# Patient Record
Sex: FEMALE | Race: BLACK OR AFRICAN AMERICAN | NOT HISPANIC OR LATINO | URBAN - METROPOLITAN AREA
[De-identification: names, ages, dates, MRNs, and addresses within clinical notes are randomized per-mention and may not be internally consistent; named-entity substitution may affect disease eponyms.]

---

## 2021-09-29 ENCOUNTER — INPATIENT (INPATIENT)
Facility: HOSPITAL | Age: 71
LOS: 5 days | Discharge: HOME | End: 2021-10-05
Attending: INTERNAL MEDICINE | Admitting: INTERNAL MEDICINE
Payer: COMMERCIAL

## 2021-09-29 VITALS
OXYGEN SATURATION: 100 % | SYSTOLIC BLOOD PRESSURE: 130 MMHG | RESPIRATION RATE: 18 BRPM | TEMPERATURE: 98 F | HEART RATE: 90 BPM | DIASTOLIC BLOOD PRESSURE: 53 MMHG

## 2021-09-29 PROCEDURE — 99285 EMERGENCY DEPT VISIT HI MDM: CPT

## 2021-09-29 NOTE — ED ADULT TRIAGE NOTE - CHIEF COMPLAINT QUOTE
pt c/o several episodes black stook since yesterday, endorses intermittent generalized weakness. Denies abdominal pain, n/v, SOB. Denies blood thinner use

## 2021-09-30 DIAGNOSIS — D62 ACUTE POSTHEMORRHAGIC ANEMIA: ICD-10-CM

## 2021-09-30 DIAGNOSIS — Z98.49 CATARACT EXTRACTION STATUS, UNSPECIFIED EYE: Chronic | ICD-10-CM

## 2021-09-30 DIAGNOSIS — E87.2 ACIDOSIS: ICD-10-CM

## 2021-09-30 DIAGNOSIS — K92.2 GASTROINTESTINAL HEMORRHAGE, UNSPECIFIED: ICD-10-CM

## 2021-09-30 DIAGNOSIS — D50.9 IRON DEFICIENCY ANEMIA, UNSPECIFIED: ICD-10-CM

## 2021-09-30 DIAGNOSIS — E11.65 TYPE 2 DIABETES MELLITUS WITH HYPERGLYCEMIA: ICD-10-CM

## 2021-09-30 DIAGNOSIS — D21.9 BENIGN NEOPLASM OF CONNECTIVE AND OTHER SOFT TISSUE, UNSPECIFIED: ICD-10-CM

## 2021-09-30 DIAGNOSIS — M54.9 DORSALGIA, UNSPECIFIED: ICD-10-CM

## 2021-09-30 DIAGNOSIS — Z29.9 ENCOUNTER FOR PROPHYLACTIC MEASURES, UNSPECIFIED: ICD-10-CM

## 2021-09-30 DIAGNOSIS — M54.17 RADICULOPATHY, LUMBOSACRAL REGION: ICD-10-CM

## 2021-09-30 DIAGNOSIS — E11.9 TYPE 2 DIABETES MELLITUS WITHOUT COMPLICATIONS: ICD-10-CM

## 2021-09-30 LAB
A1C WITH ESTIMATED AVERAGE GLUCOSE RESULT: 6.8 % — HIGH (ref 4–5.6)
ALBUMIN SERPL ELPH-MCNC: 3.6 G/DL — SIGNIFICANT CHANGE UP (ref 3.3–5)
ALBUMIN SERPL ELPH-MCNC: 3.8 G/DL — SIGNIFICANT CHANGE UP (ref 3.3–5)
ALP SERPL-CCNC: 83 U/L — SIGNIFICANT CHANGE UP (ref 40–120)
ALP SERPL-CCNC: 84 U/L — SIGNIFICANT CHANGE UP (ref 40–120)
ALT FLD-CCNC: 11 U/L — SIGNIFICANT CHANGE UP (ref 4–33)
ALT FLD-CCNC: 11 U/L — SIGNIFICANT CHANGE UP (ref 4–33)
ANION GAP SERPL CALC-SCNC: 12 MMOL/L — SIGNIFICANT CHANGE UP (ref 7–14)
ANION GAP SERPL CALC-SCNC: 13 MMOL/L — SIGNIFICANT CHANGE UP (ref 7–14)
APTT BLD: 26.5 SEC — LOW (ref 27–36.3)
APTT BLD: 31.3 SEC — SIGNIFICANT CHANGE UP (ref 27–36.3)
AST SERPL-CCNC: 12 U/L — SIGNIFICANT CHANGE UP (ref 4–32)
AST SERPL-CCNC: 12 U/L — SIGNIFICANT CHANGE UP (ref 4–32)
BASE EXCESS BLDV CALC-SCNC: -4.2 MMOL/L — LOW (ref -2–3)
BASOPHILS # BLD AUTO: 0.02 K/UL — SIGNIFICANT CHANGE UP (ref 0–0.2)
BASOPHILS # BLD AUTO: 0.02 K/UL — SIGNIFICANT CHANGE UP (ref 0–0.2)
BASOPHILS NFR BLD AUTO: 0.2 % — SIGNIFICANT CHANGE UP (ref 0–2)
BASOPHILS NFR BLD AUTO: 0.3 % — SIGNIFICANT CHANGE UP (ref 0–2)
BILIRUB SERPL-MCNC: <0.2 MG/DL — SIGNIFICANT CHANGE UP (ref 0.2–1.2)
BILIRUB SERPL-MCNC: <0.2 MG/DL — SIGNIFICANT CHANGE UP (ref 0.2–1.2)
BLD GP AB SCN SERPL QL: NEGATIVE — SIGNIFICANT CHANGE UP
BLOOD GAS VENOUS COMPREHENSIVE RESULT: SIGNIFICANT CHANGE UP
BUN SERPL-MCNC: 36 MG/DL — HIGH (ref 7–23)
BUN SERPL-MCNC: 39 MG/DL — HIGH (ref 7–23)
CALCIUM SERPL-MCNC: 8.8 MG/DL — SIGNIFICANT CHANGE UP (ref 8.4–10.5)
CALCIUM SERPL-MCNC: 8.8 MG/DL — SIGNIFICANT CHANGE UP (ref 8.4–10.5)
CHLORIDE BLDV-SCNC: 113 MMOL/L — HIGH (ref 96–108)
CHLORIDE SERPL-SCNC: 107 MMOL/L — SIGNIFICANT CHANGE UP (ref 98–107)
CHLORIDE SERPL-SCNC: 109 MMOL/L — HIGH (ref 98–107)
CO2 BLDV-SCNC: 23.5 MMOL/L — SIGNIFICANT CHANGE UP (ref 22–26)
CO2 SERPL-SCNC: 20 MMOL/L — LOW (ref 22–31)
CO2 SERPL-SCNC: 21 MMOL/L — LOW (ref 22–31)
CREAT SERPL-MCNC: 1.01 MG/DL — SIGNIFICANT CHANGE UP (ref 0.5–1.3)
CREAT SERPL-MCNC: 1.12 MG/DL — SIGNIFICANT CHANGE UP (ref 0.5–1.3)
EOSINOPHIL # BLD AUTO: 0.04 K/UL — SIGNIFICANT CHANGE UP (ref 0–0.5)
EOSINOPHIL # BLD AUTO: 0.08 K/UL — SIGNIFICANT CHANGE UP (ref 0–0.5)
EOSINOPHIL NFR BLD AUTO: 0.5 % — SIGNIFICANT CHANGE UP (ref 0–6)
EOSINOPHIL NFR BLD AUTO: 1.1 % — SIGNIFICANT CHANGE UP (ref 0–6)
ESTIMATED AVERAGE GLUCOSE: 148 — SIGNIFICANT CHANGE UP
FERRITIN SERPL-MCNC: 65 NG/ML — SIGNIFICANT CHANGE UP (ref 15–150)
GAS PNL BLDV: 141 MMOL/L — SIGNIFICANT CHANGE UP (ref 136–145)
GLUCOSE BLDC GLUCOMTR-MCNC: 115 MG/DL — HIGH (ref 70–99)
GLUCOSE BLDC GLUCOMTR-MCNC: 126 MG/DL — HIGH (ref 70–99)
GLUCOSE BLDC GLUCOMTR-MCNC: 142 MG/DL — HIGH (ref 70–99)
GLUCOSE BLDC GLUCOMTR-MCNC: 175 MG/DL — HIGH (ref 70–99)
GLUCOSE BLDC GLUCOMTR-MCNC: 175 MG/DL — HIGH (ref 70–99)
GLUCOSE BLDC GLUCOMTR-MCNC: 208 MG/DL — HIGH (ref 70–99)
GLUCOSE BLDV-MCNC: 189 MG/DL — HIGH (ref 70–99)
GLUCOSE SERPL-MCNC: 183 MG/DL — HIGH (ref 70–99)
GLUCOSE SERPL-MCNC: 337 MG/DL — HIGH (ref 70–99)
HCO3 BLDV-SCNC: 22 MMOL/L — SIGNIFICANT CHANGE UP (ref 22–29)
HCT VFR BLD CALC: 21.5 % — LOW (ref 34.5–45)
HCT VFR BLD CALC: 21.7 % — LOW (ref 34.5–45)
HCT VFR BLD CALC: 25.7 % — LOW (ref 34.5–45)
HCT VFR BLD CALC: 26 % — LOW (ref 34.5–45)
HCT VFR BLDA CALC: 22 % — LOW (ref 34.5–46.5)
HGB BLD CALC-MCNC: 7.2 G/DL — LOW (ref 11.5–15.5)
HGB BLD-MCNC: 6.7 G/DL — CRITICAL LOW (ref 11.5–15.5)
HGB BLD-MCNC: 6.8 G/DL — CRITICAL LOW (ref 11.5–15.5)
HGB BLD-MCNC: 8.3 G/DL — LOW (ref 11.5–15.5)
HGB BLD-MCNC: 8.4 G/DL — LOW (ref 11.5–15.5)
IANC: 3.92 K/UL — SIGNIFICANT CHANGE UP (ref 1.5–8.5)
IANC: 5.85 K/UL — SIGNIFICANT CHANGE UP (ref 1.5–8.5)
IMM GRANULOCYTES NFR BLD AUTO: 0.7 % — SIGNIFICANT CHANGE UP (ref 0–1.5)
IMM GRANULOCYTES NFR BLD AUTO: 1.2 % — SIGNIFICANT CHANGE UP (ref 0–1.5)
INR BLD: 0.98 RATIO — SIGNIFICANT CHANGE UP (ref 0.88–1.16)
INR BLD: 1 RATIO — SIGNIFICANT CHANGE UP (ref 0.88–1.16)
IRON SATN MFR SERPL: 28 % — SIGNIFICANT CHANGE UP (ref 14–50)
IRON SATN MFR SERPL: 64 UG/DL — SIGNIFICANT CHANGE UP (ref 30–160)
LACTATE BLDV-MCNC: 1.1 MMOL/L — SIGNIFICANT CHANGE UP (ref 0.5–2)
LYMPHOCYTES # BLD AUTO: 1.96 K/UL — SIGNIFICANT CHANGE UP (ref 1–3.3)
LYMPHOCYTES # BLD AUTO: 2.75 K/UL — SIGNIFICANT CHANGE UP (ref 1–3.3)
LYMPHOCYTES # BLD AUTO: 23.3 % — SIGNIFICANT CHANGE UP (ref 13–44)
LYMPHOCYTES # BLD AUTO: 36.8 % — SIGNIFICANT CHANGE UP (ref 13–44)
MAGNESIUM SERPL-MCNC: 2.7 MG/DL — HIGH (ref 1.6–2.6)
MCHC RBC-ENTMCNC: 26.7 PG — LOW (ref 27–34)
MCHC RBC-ENTMCNC: 27 PG — SIGNIFICANT CHANGE UP (ref 27–34)
MCHC RBC-ENTMCNC: 27.2 PG — SIGNIFICANT CHANGE UP (ref 27–34)
MCHC RBC-ENTMCNC: 28 PG — SIGNIFICANT CHANGE UP (ref 27–34)
MCHC RBC-ENTMCNC: 31.2 GM/DL — LOW (ref 32–36)
MCHC RBC-ENTMCNC: 31.3 GM/DL — LOW (ref 32–36)
MCHC RBC-ENTMCNC: 31.9 GM/DL — LOW (ref 32–36)
MCHC RBC-ENTMCNC: 32.7 GM/DL — SIGNIFICANT CHANGE UP (ref 32–36)
MCV RBC AUTO: 85.2 FL — SIGNIFICANT CHANGE UP (ref 80–100)
MCV RBC AUTO: 85.7 FL — SIGNIFICANT CHANGE UP (ref 80–100)
MCV RBC AUTO: 85.7 FL — SIGNIFICANT CHANGE UP (ref 80–100)
MCV RBC AUTO: 86.1 FL — SIGNIFICANT CHANGE UP (ref 80–100)
MONOCYTES # BLD AUTO: 0.46 K/UL — SIGNIFICANT CHANGE UP (ref 0–0.9)
MONOCYTES # BLD AUTO: 0.66 K/UL — SIGNIFICANT CHANGE UP (ref 0–0.9)
MONOCYTES NFR BLD AUTO: 5.5 % — SIGNIFICANT CHANGE UP (ref 2–14)
MONOCYTES NFR BLD AUTO: 8.8 % — SIGNIFICANT CHANGE UP (ref 2–14)
NEUTROPHILS # BLD AUTO: 3.92 K/UL — SIGNIFICANT CHANGE UP (ref 1.8–7.4)
NEUTROPHILS # BLD AUTO: 5.85 K/UL — SIGNIFICANT CHANGE UP (ref 1.8–7.4)
NEUTROPHILS NFR BLD AUTO: 52.3 % — SIGNIFICANT CHANGE UP (ref 43–77)
NEUTROPHILS NFR BLD AUTO: 69.3 % — SIGNIFICANT CHANGE UP (ref 43–77)
NRBC # BLD: 0 /100 WBCS — SIGNIFICANT CHANGE UP
NRBC # FLD: 0.02 K/UL — HIGH
NRBC # FLD: 0.04 K/UL — HIGH
NRBC # FLD: 0.04 K/UL — HIGH
NRBC # FLD: 0.05 K/UL — HIGH
OB PNL STL: POSITIVE
PCO2 BLDV: 46 MMHG — HIGH (ref 39–42)
PH BLDV: 7.29 — LOW (ref 7.32–7.43)
PHOSPHATE SERPL-MCNC: 4.3 MG/DL — SIGNIFICANT CHANGE UP (ref 2.5–4.5)
PLATELET # BLD AUTO: 185 K/UL — SIGNIFICANT CHANGE UP (ref 150–400)
PLATELET # BLD AUTO: 201 K/UL — SIGNIFICANT CHANGE UP (ref 150–400)
PLATELET # BLD AUTO: 203 K/UL — SIGNIFICANT CHANGE UP (ref 150–400)
PLATELET # BLD AUTO: 233 K/UL — SIGNIFICANT CHANGE UP (ref 150–400)
PO2 BLDV: 42 MMHG — SIGNIFICANT CHANGE UP
POTASSIUM BLDV-SCNC: 4.2 MMOL/L — SIGNIFICANT CHANGE UP (ref 3.5–5.1)
POTASSIUM SERPL-MCNC: 4.1 MMOL/L — SIGNIFICANT CHANGE UP (ref 3.5–5.3)
POTASSIUM SERPL-MCNC: 4.4 MMOL/L — SIGNIFICANT CHANGE UP (ref 3.5–5.3)
POTASSIUM SERPL-SCNC: 4.1 MMOL/L — SIGNIFICANT CHANGE UP (ref 3.5–5.3)
POTASSIUM SERPL-SCNC: 4.4 MMOL/L — SIGNIFICANT CHANGE UP (ref 3.5–5.3)
PROT SERPL-MCNC: 5.8 G/DL — LOW (ref 6–8.3)
PROT SERPL-MCNC: 6.1 G/DL — SIGNIFICANT CHANGE UP (ref 6–8.3)
PROTHROM AB SERPL-ACNC: 11.3 SEC — SIGNIFICANT CHANGE UP (ref 10.6–13.6)
PROTHROM AB SERPL-ACNC: 11.5 SEC — SIGNIFICANT CHANGE UP (ref 10.6–13.6)
RBC # BLD: 2.51 M/UL — LOW (ref 3.8–5.2)
RBC # BLD: 2.52 M/UL — LOW (ref 3.8–5.2)
RBC # BLD: 3 M/UL — LOW (ref 3.8–5.2)
RBC # BLD: 3.05 M/UL — LOW (ref 3.8–5.2)
RBC # FLD: 17.2 % — HIGH (ref 10.3–14.5)
RBC # FLD: 17.8 % — HIGH (ref 10.3–14.5)
RBC # FLD: 18.6 % — HIGH (ref 10.3–14.5)
RBC # FLD: 19.6 % — HIGH (ref 10.3–14.5)
RH IG SCN BLD-IMP: POSITIVE — SIGNIFICANT CHANGE UP
RH IG SCN BLD-IMP: POSITIVE — SIGNIFICANT CHANGE UP
SAO2 % BLDV: 73.9 % — SIGNIFICANT CHANGE UP
SARS-COV-2 RNA SPEC QL NAA+PROBE: SIGNIFICANT CHANGE UP
SODIUM SERPL-SCNC: 141 MMOL/L — SIGNIFICANT CHANGE UP (ref 135–145)
SODIUM SERPL-SCNC: 141 MMOL/L — SIGNIFICANT CHANGE UP (ref 135–145)
TIBC SERPL-MCNC: 232 UG/DL — SIGNIFICANT CHANGE UP (ref 220–430)
UIBC SERPL-MCNC: 168 UG/DL — SIGNIFICANT CHANGE UP (ref 110–370)
WBC # BLD: 7.48 K/UL — SIGNIFICANT CHANGE UP (ref 3.8–10.5)
WBC # BLD: 7.51 K/UL — SIGNIFICANT CHANGE UP (ref 3.8–10.5)
WBC # BLD: 8.07 K/UL — SIGNIFICANT CHANGE UP (ref 3.8–10.5)
WBC # BLD: 8.43 K/UL — SIGNIFICANT CHANGE UP (ref 3.8–10.5)
WBC # FLD AUTO: 7.48 K/UL — SIGNIFICANT CHANGE UP (ref 3.8–10.5)
WBC # FLD AUTO: 7.51 K/UL — SIGNIFICANT CHANGE UP (ref 3.8–10.5)
WBC # FLD AUTO: 8.07 K/UL — SIGNIFICANT CHANGE UP (ref 3.8–10.5)
WBC # FLD AUTO: 8.43 K/UL — SIGNIFICANT CHANGE UP (ref 3.8–10.5)

## 2021-09-30 PROCEDURE — 99233 SBSQ HOSP IP/OBS HIGH 50: CPT | Mod: GC

## 2021-09-30 PROCEDURE — 44360 SMALL BOWEL ENDOSCOPY: CPT | Mod: GC

## 2021-09-30 PROCEDURE — 74178 CT ABD&PLV WO CNTR FLWD CNTR: CPT | Mod: 26

## 2021-09-30 RX ORDER — PANTOPRAZOLE SODIUM 20 MG/1
80 TABLET, DELAYED RELEASE ORAL ONCE
Refills: 0 | Status: COMPLETED | OUTPATIENT
Start: 2021-09-30 | End: 2021-09-30

## 2021-09-30 RX ORDER — SODIUM CHLORIDE 9 MG/ML
1000 INJECTION, SOLUTION INTRAVENOUS
Refills: 0 | Status: DISCONTINUED | OUTPATIENT
Start: 2021-09-30 | End: 2021-10-01

## 2021-09-30 RX ORDER — DEXTROSE 50 % IN WATER 50 %
12.5 SYRINGE (ML) INTRAVENOUS ONCE
Refills: 0 | Status: DISCONTINUED | OUTPATIENT
Start: 2021-09-30 | End: 2021-10-01

## 2021-09-30 RX ORDER — SOD SULF/SODIUM/NAHCO3/KCL/PEG
4000 SOLUTION, RECONSTITUTED, ORAL ORAL ONCE
Refills: 0 | Status: COMPLETED | OUTPATIENT
Start: 2021-09-30 | End: 2021-09-30

## 2021-09-30 RX ORDER — LANOLIN ALCOHOL/MO/W.PET/CERES
3 CREAM (GRAM) TOPICAL AT BEDTIME
Refills: 0 | Status: DISCONTINUED | OUTPATIENT
Start: 2021-09-30 | End: 2021-10-05

## 2021-09-30 RX ORDER — GLUCAGON INJECTION, SOLUTION 0.5 MG/.1ML
1 INJECTION, SOLUTION SUBCUTANEOUS ONCE
Refills: 0 | Status: DISCONTINUED | OUTPATIENT
Start: 2021-09-30 | End: 2021-10-01

## 2021-09-30 RX ORDER — DEXTROSE 50 % IN WATER 50 %
15 SYRINGE (ML) INTRAVENOUS ONCE
Refills: 0 | Status: DISCONTINUED | OUTPATIENT
Start: 2021-09-30 | End: 2021-10-01

## 2021-09-30 RX ORDER — INSULIN LISPRO 100/ML
VIAL (ML) SUBCUTANEOUS EVERY 6 HOURS
Refills: 0 | Status: DISCONTINUED | OUTPATIENT
Start: 2021-09-30 | End: 2021-10-01

## 2021-09-30 RX ORDER — DEXTROSE 50 % IN WATER 50 %
25 SYRINGE (ML) INTRAVENOUS ONCE
Refills: 0 | Status: DISCONTINUED | OUTPATIENT
Start: 2021-09-30 | End: 2021-10-01

## 2021-09-30 RX ORDER — ONDANSETRON 8 MG/1
4 TABLET, FILM COATED ORAL EVERY 8 HOURS
Refills: 0 | Status: DISCONTINUED | OUTPATIENT
Start: 2021-09-30 | End: 2021-10-05

## 2021-09-30 RX ORDER — INSULIN LISPRO 100/ML
VIAL (ML) SUBCUTANEOUS AT BEDTIME
Refills: 0 | Status: DISCONTINUED | OUTPATIENT
Start: 2021-09-30 | End: 2021-09-30

## 2021-09-30 RX ORDER — PANTOPRAZOLE SODIUM 20 MG/1
8 TABLET, DELAYED RELEASE ORAL
Qty: 80 | Refills: 0 | Status: DISCONTINUED | OUTPATIENT
Start: 2021-09-30 | End: 2021-10-01

## 2021-09-30 RX ORDER — ACETAMINOPHEN 500 MG
650 TABLET ORAL EVERY 6 HOURS
Refills: 0 | Status: DISCONTINUED | OUTPATIENT
Start: 2021-09-30 | End: 2021-10-05

## 2021-09-30 RX ORDER — CYCLOBENZAPRINE HYDROCHLORIDE 10 MG/1
5 TABLET, FILM COATED ORAL
Refills: 0 | Status: DISCONTINUED | OUTPATIENT
Start: 2021-09-30 | End: 2021-10-05

## 2021-09-30 RX ADMIN — Medication 4000 MILLILITER(S): at 18:24

## 2021-09-30 RX ADMIN — PANTOPRAZOLE SODIUM 10 MG/HR: 20 TABLET, DELAYED RELEASE ORAL at 05:17

## 2021-09-30 RX ADMIN — PANTOPRAZOLE SODIUM 80 MILLIGRAM(S): 20 TABLET, DELAYED RELEASE ORAL at 03:58

## 2021-09-30 RX ADMIN — Medication 1: at 14:04

## 2021-09-30 NOTE — ED ADULT NURSE NOTE - SUICIDE SCREENING QUESTION 2
Verified Results  VITAMIN D,25 HYDROXY [id number] 10:29AM ANDREA Lawrence     Test Name Result Flag Reference   VIT D,25 HYDROXY 10.7 ng/ml L 30.0-100.0   <20  ng/mL=Vitamin D deficiency  20-29  ng/mL=Vitamin D insufficiency   ng/mL=Optimal Vitamin D  >150 ng/mL=Possible toxicity
No

## 2021-09-30 NOTE — H&P ADULT - HISTORY OF PRESENT ILLNESS
69yo F w/ pmh of uterine leiomyomas, iron deficiency anemia, HTN presents with melena and diarrhea for the past 2 days. States that she first noticed some bright red blood per rectum two days ago that turned into melanotic diarrhea. Has had approximately 3 episodes of melena yesterday, and went to see her GI doctor, Dr. Darline Gordillo this morning who referred her for admission for urgent endoscopy for GI bleed. Endorses concomitant fatigue/ lightheadedness and mild skin pallor. Of note, patient has been complaining of back pain recently and has taken approximately 600 mg of alleve in the past 2 days. Pt reports 1 incidence of GI bleed 10yrs ago for which she required blood transfusion (states she doesn't remember what source of bleed was). Reports that she had a colonoscopy completed 3-4 months ago as well as a capsule study; patient was unsure why she required a capsule study, but was noted to have an endoscopy with Dr. Gordillo in October. States that she had one polyp in the past that was resected and has internal hemorrhoids. Denies any vaginal bleeding/discharge, is scheduled for gynecology procedure end of this month for her fibroids. Denies any pain, nausea, vomiting, fevers, chills, chest pain/palpitations, shortness of breath, constipation recent sick contacts or travel. Has been compliant with all her medications, including her protonix 40 mg PO daily. Family history of pancreatic cancer in father age 77, and colon cancer in mother age 81.     In the ED, patient was afebrile, hemodynamically stable and saturating well on room air.  Hgb 6.7, platelets 233. Received 2 unit of pRBCs in ED. CT abdomen pelvis showed no acute intra-abdominal or pelvic disease. No evidence of active GI bleed.     71yo F w/ pmh of uterine leiomyomas, iron deficiency anemia, HTN presents with melena and diarrhea for the past 2 days. States that she first noticed some bright red blood per rectum two days ago that turned into melanotic diarrhea. Has had approximately 3 episodes of melena yesterday, and went to see her GI doctor, Dr. Darline Gordillo this morning who referred her for admission for urgent endoscopy for GI bleed. Endorses concomitant fatigue/ lightheadedness and mild skin pallor. Of note, patient has been complaining of back pain recently and has taken approximately 600 mg of aleve in the past 2 days. Has had H. Pylori infection earlier this year that was treated with resolution of infection on repeat stool antigen testing. Pt reports 1 incidence of GI bleed 10yrs ago for which she required blood transfusion (states she doesn't remember what source of bleed was). Reports that she had a colonoscopy completed 3-4 months ago as well as a capsule study; patient was unsure why she required a capsule study, but was noted to have an endoscopy with Dr. Gordillo in October. States that she had one polyp in the past that was resected and has internal hemorrhoids. Denies any vaginal bleeding/discharge, is scheduled for gynecology procedure end of this month for her fibroids. Denies any pain, nausea, vomiting, fevers, chills, chest pain/palpitations, shortness of breath, constipation recent sick contacts or travel. Has been compliant with all her medications, including her protonix 40 mg PO daily. Family history of pancreatic cancer in father age 77, and colon cancer in mother age 81.     In the ED, patient was afebrile, hemodynamically stable and saturating well on room air.  Hgb 6.7, platelets 233. Received 2 unit of pRBCs in ED. CT abdomen pelvis showed no acute intra-abdominal or pelvic disease. No evidence of active GI bleed.     71yo Female w/ mhx of uterine leiomyomas, iron deficiency anemia, HTN presents with melena and diarrhea for the past 2 days. States that she first noticed some bright red blood per rectum two days ago that turned into melanotic diarrhea. Has had approximately 3 episodes of melena yesterday, and went to see her GI doctor, Dr. Darline Gordillo, this morning who referred her for admission for urgent endoscopy for GI bleed. Endorses concomitant fatigue/ lightheadedness and mild skin pallor. Of note, patient has been complaining of Rt lower back pain recently and has taken approximately 600 mg of aleve daily for the past 3 days. Has had H. Pylori infection earlier this year that was treated with resolution of infection on repeat stool antigen testing. Pt reports one incidence of GI bleed 10yrs ago for which she required blood transfusion (states she doesn't remember what source of bleed was). Reports that she had a colonoscopy completed 3-4 months ago as well as a capsule study; patient was unsure why she required a capsule study, but was noted to have an endoscopy with Dr. Gordillo in October. States that she had one polyp in the past that was resected and has internal hemorrhoids. Denies any vaginal bleeding/discharge, is scheduled for gynecology procedure end of this month for her fibroids. Denies any pain, nausea, vomiting, fevers, chills, chest pain/palpitations, shortness of breath, constipation recent sick contacts or travel. Has been compliant with all her medications, including her protonix 40 mg PO daily. Family history of pancreatic cancer in father age 77, and colon cancer in mother age 81.     ED course: afebrile, saturating well on room air.  Hgb 6.7, platelets 233K. Received 2 unit of pRBCs

## 2021-09-30 NOTE — ED PROVIDER NOTE - CLINICAL SUMMARY MEDICAL DECISION MAKING FREE TEXT BOX
69 y/o female with pmhx of anemia, DM, peptic ulcer presenting with dark stool x 2 days with dizziness/weakness, rectal significant for dark/bloody stool, abdomen NTTP, no vomiting. Will check H/H for suspicion of GI bleed with type/screen as patient will likely require transfusion and admission

## 2021-09-30 NOTE — H&P ADULT - NSHPLABSRESULTS_GEN_ALL_CORE
LABS:                           6.7    8.43  )-----------( 233      ( 30 Sep 2021 00:34 )             21.5     09-30    141  |  107  |  39<H>  ----------------------------<  337<H>  4.4   |  21<L>  |  1.12    Ca    8.8      30 Sep 2021 00:34  Phos  4.3     09-30  Mg     2.70     09-30    TPro  6.1  /  Alb  3.8  /  TBili  <0.2  /  DBili  x   /  AST  12  /  ALT  11  /  AlkPhos  84  09-30        PT/INR - ( 30 Sep 2021 00:34 )   PT: 11.3 sec;   INR: 0.98 ratio         PTT - ( 30 Sep 2021 00:34 )  PTT:31.3 sec          LIVER FUNCTIONS - ( 30 Sep 2021 00:34 )  Alb: 3.8 g/dL / Pro: 6.1 g/dL / ALK PHOS: 84 U/L / ALT: 11 U/L / AST: 12 U/L / GGT: x           < from: CT Abdomen and Pelvis w/wo IV Cont (09.30.21 @ 02:46) >    FINDINGS:  LOWER CHEST: Within normal limits.    LIVER: Within normal limits.  BILE DUCTS: Normal caliber.  GALLBLADDER: Within normal limits.  SPLEEN: Within normal limits.  PANCREAS: Within normal limits.  ADRENALS: Within normal limits.  KIDNEYS/URETERS: Symmetrical enhancement without hydronephrosis. Simple cortical cysts in both kidneys largest measuring 2.3 cm.    BLADDER: Within normal limits.  REPRODUCTIVE ORGANS: Leiomyomatous uterus with multiple bulky partially calcified fibroids.    BOWEL: No bowel obstruction. Appendix is not visualized. No evidence of inflammation in the pericecal region. There is diverticulosis without evidence of diverticulitis.  PERITONEUM: No ascites.  VESSELS: Within normal limits.  RETROPERITONEUM/LYMPH NODES: No lymphadenopathy.  ABDOMINAL WALL: Within normal limits.  BONES: Degenerative changes.    IMPRESSION:  No acute intra-abdominal or pelvic disease. No evidence of active GI bleed.    < end of copied text > EKG, 9/29, NSR 89bpm, QTc 457, no acute Tw or ST changes - personally interpreted     Personally reviewed the lab below:                          6.7    8.43  )-----------( 233      ( 30 Sep 2021 00:34 )             21.5     09-30    141  |  107  |  39<H>  ----------------------------<  337<H>  4.4   |  21<L>  |  1.12    Ca    8.8      30 Sep 2021 00:34  Phos  4.3     09-30  Mg     2.70     09-30    TPro  6.1  /  Alb  3.8  /  TBili  <0.2  /  DBili  x   /  AST  12  /  ALT  11  /  AlkPhos  84  09-30        PT/INR - ( 30 Sep 2021 00:34 )   PT: 11.3 sec;   INR: 0.98 ratio         PTT - ( 30 Sep 2021 00:34 )  PTT:31.3 sec    LIVER FUNCTIONS - ( 30 Sep 2021 00:34 )  Alb: 3.8 g/dL / Pro: 6.1 g/dL / ALK PHOS: 84 U/L / ALT: 11 U/L / AST: 12 U/L / GGT: x               Personally reviewed the radiological imaging below:    CT Abdomen and Pelvis w/wo IV Cont 09.30.21    FINDINGS:  LOWER CHEST: Within normal limits.    LIVER: Within normal limits.  BILE DUCTS: Normal caliber.  GALLBLADDER: Within normal limits.  SPLEEN: Within normal limits.  PANCREAS: Within normal limits.  ADRENALS: Within normal limits.  KIDNEYS/URETERS: Symmetrical enhancement without hydronephrosis. Simple cortical cysts in both kidneys largest measuring 2.3 cm.    BLADDER: Within normal limits.  REPRODUCTIVE ORGANS: Leiomyomatous uterus with multiple bulky partially calcified fibroids.    BOWEL: No bowel obstruction. Appendix is not visualized. No evidence of inflammation in the pericecal region. There is diverticulosis without evidence of diverticulitis.  PERITONEUM: No ascites.  VESSELS: Within normal limits.  RETROPERITONEUM/LYMPH NODES: No lymphadenopathy.  ABDOMINAL WALL: Within normal limits.  BONES: Degenerative changes.  IMPRESSION:  No acute intra-abdominal or pelvic disease. No evidence of active GI bleed.

## 2021-09-30 NOTE — ED PROVIDER NOTE - PHYSICAL EXAMINATION
Gen: WDWN, NAD  HEENT: B/l conjunctival pallor, PERRLA, EOMI, no nasal discharge, mucous membranes moist, no oropharyngeal edema/erythema/exudates   CV: RRR, +S1/S2, no M/R/G  Resp: CTAB, no W/R/R  GI: Abdomen soft non-distended, NTTP, no masses/organomegaly   MSK/Skin: No open wounds, no bruising, no LE edema  Neuro: CN2-12 grossly intact, A&Ox4, MS +5/5 in UE and LE BL, gross sensation intact in UE and LE BL  Psych: appropriate mood

## 2021-09-30 NOTE — CONSULT NOTE ADULT - ASSESSMENT
71yo Female w/ mhx of uterine leiomyomas, iron deficiency anemia, HTN presents with melena in setting of NSAIDs use.    #Melena  Differential for upper GI bleeding mostly concerning for Peptic Ulcer disease from H pylori vs NSAIDs use but other etiology includes Dieulafoy lesion, gastritis, esophagitis, malignancy and angiodysplasia.    Recommendations:  - Keep NPO plan for EGD this afternoon  - Please repeat CBC after 2nd unit of blood.   - Maintain active type & screen  - Transfuse to maintain Hbg > 7.0 or > 8 inpatient with pre-existing cardiovascular conditions.  - Plt goal > 50  - Maintain access with 2 x Large bore IV  - Continue Pantoprazole 40 mg IV q12H    Phillip Lane MD  Gastroenterology/Hepatology Fellow  1st option: [phone] (text or call), ONLY available from 7:00 am to 5:00 pm.   **Contact on-call GI fellow via answering service ([phone]) from 5pm-7am AND on weekends/holidays**  2nd option: Available via [employer] Teams  3rd option: Pager: [phone]    NON-URGENT CONSULTS:  Please email [email or web address]  [email or web address]  AT NIGHT AND ON WEEKENDS:  Contact on-call GI fellow via answering service ([phone]) from 5pm-8am AND on weekends/holidays

## 2021-09-30 NOTE — H&P ADULT - PROBLEM SELECTOR PLAN 3
History of Type II diabetes currently on oral agents at home  - continue low dose insulin scale  - monitor POCT qAC/qHS   - monitor for hypoglycemic symptoms  - check A1C PE c/w Rt L-S radiculopathy   Tylenol   Flexeril  Ordered Xray LS spine   PT eval to r/o gait instability

## 2021-09-30 NOTE — H&P ADULT - NSHPREVIEWOFSYSTEMS_GEN_ALL_CORE
REVIEW OF SYSTEMS:  CONSTITUTIONAL: No fever, chills, night sweats, + fatigue  EYES: No eye pain, visual disturbances, or discharge  ENMT:  No difficulty hearing, tinnitus, vertigo; No sinus or throat pain  NECK: No pain or stiffness  RESPIRATORY: No cough, wheezing, or hemoptysis; No shortness of breath  CARDIOVASCULAR: No chest pain, palpitations, dizziness, or leg swelling  GASTROINTESTINAL: No abdominal or epigastric pain. No nausea, vomiting, or hematemesis; No diarrhea or constipation. + melena  GENITOURINARY: No dysuria, frequency, hematuria, or incontinence  NEUROLOGICAL: No headaches, memory loss, loss of strength, numbness, or tremors  ALLERGY AND IMMUNOLOGIC: No hives or eczema REVIEW OF SYSTEMS:  CONSTITUTIONAL: No fever, chills, night sweats, + fatigue  EYES: No eye pain, visual disturbances, or discharge  ENMT:  No difficulty hearing, tinnitus, vertigo; No sinus or throat pain  NECK: No pain or stiffness  RESPIRATORY: No cough, wheezing, or hemoptysis; No shortness of breath  CARDIOVASCULAR: No chest pain, palpitations, dizziness, or leg swelling  GASTROINTESTINAL: No abdominal or epigastric pain. No nausea, vomiting, or hematemesis;  + melena  GENITOURINARY: No dysuria, frequency, hematuria, or incontinence  NEUROLOGICAL: No headaches, memory loss, loss of strength, numbness, or tremors  ALLERGY AND IMMUNOLOGIC: No hives or eczema    Additional ROS below

## 2021-09-30 NOTE — PROGRESS NOTE ADULT - PROBLEM SELECTOR PLAN 3
Hx of ALFREDO, unclear etiology, may be in the setting of vaginal bleeding from leiomyomas vs occult GIB   Has been receiving weekly iron infusions - follows Dr. Flynn. as an outpatient  Per outpatient hx - patient baseline over this year is 8-8.7.   Iron studies otherwise WNL however this is within the setting of regular iron infusions  - Monitoring CBCs  - Will contact Dr. Flynn to see what work up has been done for the patient and proceed from there. Hx of ALFREDO, unclear etiology, may be in the setting of vaginal bleeding from leiomyomas vs occult GIB   Has been receiving weekly iron infusions - follows Dr. Flynn. as an outpatient  Per outpatient hx - patient baseline over this year is 8-8.7.   Iron studies otherwise WNL however this is within the setting of regular iron infusions  - Monitoring CBCs at this time  - Will consider further work up of anemia if patient's anemia does not resolve post GI intervention and transfusion.

## 2021-09-30 NOTE — PROGRESS NOTE ADULT - ASSESSMENT
69yo Female w/ mhx of uterine leiomyomas, iron deficiency anemia, HTN, recent hx of H. Pylori infection, lower back pain (taking Aleve) admitted for melena and severe anemia requiring transfusions. Currently NPO, pending GI eval for possible EGD.

## 2021-09-30 NOTE — H&P ADULT - PROBLEM SELECTOR PLAN 6
DVT: SCDs  Diet: NPO for possible procedure   Dispo: DVTppx, SCDs, no Heparin sq due to active GIB   Diet: NPO for possible procedure   Dispo:

## 2021-09-30 NOTE — ED ADULT NURSE REASSESSMENT NOTE - NS ED NURSE REASSESS COMMENT FT1
Patient tolerating blood transfusion well.  No adverse reactions noted.  Will continue to monitor patient.

## 2021-09-30 NOTE — ED PROVIDER NOTE - NS ED ROS FT
Gen: Denies fever, weight loss  CV: Denies chest pain, palpitations  Skin: Denies rash, erythema, color changes  Resp: Denies SOB, cough  Endo: Denies sensitivity to heat, cold, increased urination  GI: Denies constipation, nausea, vomiting  Msk: Denies back pain, LE swelling, extremity pain  : Denies dysuria, increased frequency  Neuro: Denies LOC, numbness/tingling

## 2021-09-30 NOTE — H&P ADULT - NSICDXFAMILYHX_GEN_ALL_CORE_FT
FAMILY HISTORY:  Father  Still living? Unknown  FH: pancreatic cancer, Age at diagnosis: Age Unknown    Mother  Still living? Unknown  FHx: colon cancer, Age at diagnosis: Age Unknown

## 2021-09-30 NOTE — H&P ADULT - PROBLEM SELECTOR PLAN 1
Acute melena and diarrhea x 2 days, concerning for GI bleed with hemoglobin 6.7 on admission  - follows with Dr. Gordillo outpatient, recently had colonoscopy / capsule study with grossly normal findings  - CT abdomen pelvis showed no acute intra-abdominal or pelvic disease. No evidence of active GI bleed  - currently hemodynamically stable   - continue protonix gtt  - maintain active type and screen ; transfusion goal Hgb > 7  - hold chemoprophylaxis for anticoagulation  - NPO for now for possible procedure   - GI consulted for evaluation of new bleed Acute UGIB with melena and diarrhea x 2 days; Occult blood (+) concerning for PUD given h/o H pylori with NSAIDs use;  Hemoglobin 6.7 on admission  - follows with Dr. Gordillo outpatient, recently had colonoscopy / capsule study with grossly normal findings  - CT abdomen pelvis showed no acute intra-abdominal or pelvic disease.   - Ordered Protonix gtt  - maintain active type and screen ; transfusion goal Hgb > 7  - hold chemoprophylaxis for anticoagulation  - NPO for now for possible procedure   - Monitor Hgb q12-24h  - GI consulted for EGD evaluation of new bleed  - Discontinuation of NSAIDs was d/w the pt at bedside

## 2021-09-30 NOTE — ED PROVIDER NOTE - ATTENDING CONTRIBUTION TO CARE
I performed a face-to-face evaluation of the patient and performed a history and physical examination along with the resident. I agree with the history and physical examination as documented by the resident above.    Travis: 69yo F w/ pmh of uterine leiomyomas, iron deficiency anemia sent in by her GI doctor for admission for endoscopy for GI bleed. Pt reports 1 incidence of GI bleed 10yrs ago for which she required blood transfusion (states she doesn't remember what source of bleed was), now experiencing painless black stools mixed with brb since yesterday, associated w/ generalized weakness and lightheadedness. Denies pain, fever, chill, n/v, cp, or sob. No surgical hx. Suspect symptomatic anemia 2/2 to GI bleed (unclear if upper or lower). Labs, imaging, likely transfusion and admission.

## 2021-09-30 NOTE — ED PROVIDER NOTE - HIV OFFER
Opt out Otezla Counseling: The side effects of Otezla were discussed with the patient, including but not limited to worsening or new depression, weight loss, diarrhea, nausea, upper respiratory tract infection, and headache. Patient instructed to call the office should any adverse effect occur.  The patient verbalized understanding of the proper use and possible adverse effects of Otezla.  All the patient's questions and concerns were addressed.

## 2021-09-30 NOTE — H&P ADULT - PROBLEM SELECTOR PLAN 5
DVT: SCDs  Diet: NPO for possible procedure   Dispo: History of fibroids, followed by GYN outpatient  - no active vaginal bleeding or hematuria  - plan for outpatient GYN procedure

## 2021-09-30 NOTE — H&P ADULT - PROBLEM SELECTOR PLAN 4
History of fibroids, followed by GYN outpatient  - no active vaginal bleeding or hematuria  - plan for outpatient GYN procedure History of back pain, likely musculoskeletal in origin  - xray of lumbar spine ordered for further evaluation  - start flexeril 5mg PO BID for muscle spasms  - will likely require outpatient PT follow up History of Type II diabetes currently on oral agents at home  - continue low dose insulin scale  - monitor POCT qAC/qHS   - monitor for hypoglycemic symptoms  - check A1C

## 2021-09-30 NOTE — ED ADULT NURSE NOTE - OBJECTIVE STATEMENT
Patient is awake, A&O x 4, NAD, seen by GI MD and sent to ED for eval for dark stool.  She is experiencing dizziness and lightheadedness with episodes of dark stool since yesterday.  History of blood transfusion 10 years ago for similar sx.  Mild SOB early today when she saw her GI MD but denies SOB at this time.   20g IV left AC, labs drawn and sent, vitals taken and will continue to monitor patient.

## 2021-09-30 NOTE — PROGRESS NOTE ADULT - SUBJECTIVE AND OBJECTIVE BOX
Eliu Chapin MD  Internal Medicine, PGY-1  Pager: [phone] / [facility]: [phone]    SUBJECTIVE / OVERNIGHT EVENTS:  - Pt seen and examined at bedside  - 1 unit transfused overnight, tolerated well  - The patient reported that she had appox 6 melanonic BMs yesterday associated w/ dizziness, which prompted her to visit the ED for further eval. This morning the patient stated that she feels good. The diarrhea had stopped by the time she arrived in the hospital and she hasn't had a BM since then. The patient denies any abdominal pain, nausea, vomiting.     MEDICATIONS  (STANDING):  dextrose 40% Gel 15 Gram(s) Oral once  dextrose 5%. 1000 milliLiter(s) (50 mL/Hr) IV Continuous <Continuous>  dextrose 5%. 1000 milliLiter(s) (100 mL/Hr) IV Continuous <Continuous>  dextrose 50% Injectable 25 Gram(s) IV Push once  dextrose 50% Injectable 12.5 Gram(s) IV Push once  dextrose 50% Injectable 25 Gram(s) IV Push once  glucagon  Injectable 1 milliGRAM(s) IntraMuscular once  insulin lispro (ADMELOG) corrective regimen sliding scale   SubCutaneous every 6 hours  insulin lispro (ADMELOG) corrective regimen sliding scale   SubCutaneous at bedtime  pantoprazole Infusion 8 mG/Hr (10 mL/Hr) IV Continuous <Continuous>    MEDICATIONS  (PRN):  acetaminophen   Tablet .. 650 milliGRAM(s) Oral every 6 hours PRN Temp greater or equal to 38C (100.4F), Mild Pain (1 - 3)  cyclobenzaprine 5 milliGRAM(s) Oral two times a day PRN Muscle Spasm  melatonin 3 milliGRAM(s) Oral at bedtime PRN Insomnia  ondansetron Injectable 4 milliGRAM(s) IV Push every 8 hours PRN Nausea and/or Vomiting      PHYSICAL EXAM:  Vital Signs Last 24 Hrs  T(C): 36.9 (30 Sep 2021 06:23), Max: 36.9 (30 Sep 2021 04:45)  T(F): 98.4 (30 Sep 2021 06:23), Max: 98.4 (30 Sep 2021 04:45)  HR: 79 (30 Sep 2021 06:23) (79 - 90)  BP: 126/68 (30 Sep 2021 06:23) (115/54 - 131/59)  BP(mean): --  RR: 20 (30 Sep 2021 06:23) (16 - 20)  SpO2: 100% (30 Sep 2021 06:23) (100% - 100%)    CAPILLARY BLOOD GLUCOSE      POCT Blood Glucose.: 175 mg/dL (30 Sep 2021 08:29)  POCT Blood Glucose.: 208 mg/dL (30 Sep 2021 05:27)    I&O's Summary      CONSTITUTIONAL: NAD, well-developed  RESPIRATORY: Normal respiratory effort; lungs are clear to auscultation bilaterally  CARDIOVASCULAR: Regular rate and rhythm, normal S1 and S2, no murmur/rub/gallop; No lower extremity edema; Peripheral pulses are 2+ bilaterally  ABDOMEN: Nontender to palpation, normoactive bowel sounds, no rebound/guarding; No hepatosplenomegaly  MUSCLOSKELETAL: no clubbing or cyanosis of digits; no joint swelling or tenderness to palpation  PSYCH: A+O to person, place, and time; affect appropriate    LABS:                        6.8    7.48  )-----------( 203      ( 30 Sep 2021 06:32 )             21.7     09-30    141  |  109<H>  |  36<H>  ----------------------------<  183<H>  4.1   |  20<L>  |  1.01    Ca    8.8      30 Sep 2021 06:32  Phos  4.3     09-30  Mg     2.70     09-30    TPro  5.8<L>  /  Alb  3.6  /  TBili  <0.2  /  DBili  x   /  AST  12  /  ALT  11  /  AlkPhos  83  09-30    PT/INR - ( 30 Sep 2021 06:32 )   PT: 11.5 sec;   INR: 1.00 ratio         PTT - ( 30 Sep 2021 06:32 )  PTT:26.5 sec            IMAGING:    CT Abdomen (9/30/2021):     BLADDER: Within normal limits.  REPRODUCTIVE ORGANS: Leiomyomatous uterus with multiple bulky partially calcified fibroids.    BOWEL: No bowel obstruction. Appendix is not visualized. No evidence of inflammation in the pericecal region. There is diverticulosis without evidence of diverticulitis.  PERITONEUM: No ascites.  VESSELS: Within normal limits.  RETROPERITONEUM/LYMPH NODES: No lymphadenopathy.  ABDOMINAL WALL: Within normal limits.  BONES: Degenerative changes.    IMPRESSION:  No acute intra-abdominal or pelvic disease. No evidence of active GI bleed.

## 2021-09-30 NOTE — PROGRESS NOTE ADULT - PROBLEM SELECTOR PLAN 1
Acute UGIB with melena and diarrhea x 2 days w/ associated dizziness. Hx of prior GIB 2/2 PUD w/ H pylori, s/p full course of tx on 3/2021. Has been taking several Alleve per day for new back pain.   Current bleed likely UGIB in the setting of PUD from NSAID use vs H pylori treatment failure, however also considering AVM, Diulafoy's lesion. Unlikely LGIB given melena, however prior CT abd demonstrated diverticulosis, however CT abd this admission negative for LGIB.   Prior GI Hx: 5/17/21 - colonoscopy 2 polyps in ascending and transverse colon. 6/2021 - Capsule study negative for bleed  Outpatient GI: Dr. Gordillo - referred patient to hospital     - Trending vitals, obtaining post-transfusion CBC  - Maintaining active type and screen  - Will transfuse if Hgb < 7.0   - Patient strongly advised to avoid all NSAIDs from now on  - Obtaining H Pylori stool antigen test given recent hx   - Continuing heparin gtt  - NPO given possible intervention  - GI consulted for possible intervention, appreciating all recs Acute UGIB with melena and diarrhea x 2 days w/ associated dizziness. Hx of prior GIB 2/2 PUD w/ H pylori, s/p full course of tx on 3/2021. Has been taking several Alleve per day for new back pain.   Current bleed likely UGIB in the setting of PUD from NSAID use vs H pylori treatment failure, however also considering AVM, Diulafoy's lesion.   Prior GI Hx: 5/17/21 - colonoscopy 2 polyps in ascending and transverse colon. 6/2021 - Capsule study negative for bleed  Outpatient GI: Dr. Gordillo - referred patient to hospital     - Trending vitals, obtaining post-transfusion CBC  - Maintaining active type and screen  - Will transfuse if Hgb < 7.0   - Patient strongly advised to avoid all NSAIDs from now on  - Obtaining H Pylori stool antigen test given recent hx   - Continuing Protonix gtt  - NPO given possible intervention  - GI consulted for possible intervention, appreciating all recs

## 2021-09-30 NOTE — H&P ADULT - ASSESSMENT
71yo F w/ pmh of uterine leiomyomas, iron deficiency anemia, HTN presents with melena and diarrhea for the past 2 days concerning for new UGIB.  71yo Female w/ mhx of uterine leiomyomas, iron deficiency anemia, HTN, recent hx of H. Pylori infection, lower back pain (taking Aleve) a/w UGIB c/b acute blood loss anemia requiring transfusion;

## 2021-09-30 NOTE — ED ADULT NURSE REASSESSMENT NOTE - NS ED NURSE REASSESS COMMENT FT1
Patient is awake, A&O x 4, NAD, vitals taken, 1st unit of PRBC started, patient educated on s/sx of an adverse reaction and advised to notified the nurse immediately.  Will continue to monitor patient.

## 2021-09-30 NOTE — ED PROVIDER NOTE - OBJECTIVE STATEMENT
69 y/o female with pmhx of anemia, DM, peptic ulcer presenting with dark stool x 2 days. Patient describes stool as diarrhea, dark brown/black mixed with blood. Feels intermittently dizzy and weak but denies any LOC, chest pain, palpitations, SOB, or headache. Also reports "rumbling" on both sides of her abdomen but denies any abdominal pain. Denies fevers/chills, n/v, cough, rashes, or changes in urination. Sent in by GI doctor Dr. Gregorio and sees hematologist Dr. Haile for for IV iron transfusions. Patient believes skin looks paler than usual.

## 2021-09-30 NOTE — CONSULT NOTE ADULT - SUBJECTIVE AND OBJECTIVE BOX
HPI:  71yo Female w/ mhx of uterine leiomyomas, iron deficiency anemia, HTN presents with melena and diarrhea for the past 2 days. States that she first noticed some bright red blood per rectum two days ago that turned into melanotic diarrhea. Has had approximately 3 episodes of melena yesterday, and went to see her GI doctor, Dr. Darline Gordillo, this morning who referred her for admission for urgent endoscopy for GI bleed. Endorses concomitant fatigue/ lightheadedness and mild skin pallor. Of note, patient has been complaining of Rt lower back pain recently and has taken approximately 600 mg of aleve daily for the past 3 days. Previous work up includes EGD on March with PUD and H pylori +, Colonoscopy on May with polyps, and negative VCE.     ED course: afebrile, saturating well on room air.  Hgb 6.7, platelets 233K. Received 2 unit of pRBCs  (      Allergies:  No Known Allergies        Hospital Medications:  acetaminophen   Tablet .. 650 milliGRAM(s) Oral every 6 hours PRN  cyclobenzaprine 5 milliGRAM(s) Oral two times a day PRN  dextrose 40% Gel 15 Gram(s) Oral once  dextrose 5%. 1000 milliLiter(s) IV Continuous <Continuous>  dextrose 5%. 1000 milliLiter(s) IV Continuous <Continuous>  dextrose 50% Injectable 25 Gram(s) IV Push once  dextrose 50% Injectable 12.5 Gram(s) IV Push once  dextrose 50% Injectable 25 Gram(s) IV Push once  glucagon  Injectable 1 milliGRAM(s) IntraMuscular once  insulin lispro (ADMELOG) corrective regimen sliding scale   SubCutaneous every 6 hours  insulin lispro (ADMELOG) corrective regimen sliding scale   SubCutaneous at bedtime  melatonin 3 milliGRAM(s) Oral at bedtime PRN  ondansetron Injectable 4 milliGRAM(s) IV Push every 8 hours PRN  pantoprazole Infusion 8 mG/Hr IV Continuous <Continuous>      PMHX/PSHX:  Chronic GERD    Internal hemorrhoid    Fibroids    Diabetes    S/P cataract surgery        Family history:  FH: pancreatic cancer (Father)    FHx: colon cancer (Mother)        Social History: no smoking    ROS:   General:  No fevers, chills or night sweats  ENT:  No sore throat or dysphagia  CV:  No pain or palpitations  Resp:  No dyspnea, cough or  wheezing  GI:  as above  Skin:  No rash or edema  Neuro: no weakness   Hematologic: no bleeding  Musculoskeletal: no muscle pain or join pain  Psych: no agitation     : no dysuria      PHYSICAL EXAM:   GENERAL:  NAD, Appears stated age  HEENT:  NC/AT,  conjunctivae clear and pink, sclera -anicteric  CHEST:  CTA B/L, Normal effort  HEART:  RRR S1/S2,  ABDOMEN:  Soft, non-tender, non-distended,  no masses   EXTREMITIES:  No cyanosis or Edema  SKIN:  Warm & Dry. No rash or erythema  NEURO:  Alert, oriented, no focal deficit    Vital Signs:  Vital Signs Last 24 Hrs  T(C): 36.5 (30 Sep 2021 08:00), Max: 36.9 (30 Sep 2021 04:45)  T(F): 97.7 (30 Sep 2021 08:00), Max: 98.4 (30 Sep 2021 04:45)  HR: 79 (30 Sep 2021 08:00) (79 - 90)  BP: 121/61 (30 Sep 2021 08:00) (115/54 - 131/59)  BP(mean): --  RR: 19 (30 Sep 2021 08:00) (16 - 20)  SpO2: 100% (30 Sep 2021 08:00) (100% - 100%)  Daily Height in cm: 152.4 (30 Sep 2021 08:00)    Daily     LABS:                        6.8    7.48  )-----------( 203      ( 30 Sep 2021 06:32 )             21.7     Mean Cell Volume: 86.1 fL (09-30-21 @ 06:32)    09-30    141  |  109<H>  |  36<H>  ----------------------------<  183<H>  4.1   |  20<L>  |  1.01    Ca    8.8      30 Sep 2021 06:32  Phos  4.3     09-30  Mg     2.70     09-30    TPro  5.8<L>  /  Alb  3.6  /  TBili  <0.2  /  DBili  x   /  AST  12  /  ALT  11  /  AlkPhos  83  09-30    LIVER FUNCTIONS - ( 30 Sep 2021 06:32 )  Alb: 3.6 g/dL / Pro: 5.8 g/dL / ALK PHOS: 83 U/L / ALT: 11 U/L / AST: 12 U/L / GGT: x           PT/INR - ( 30 Sep 2021 06:32 )   PT: 11.5 sec;   INR: 1.00 ratio         PTT - ( 30 Sep 2021 06:32 )  PTT:26.5 sec                            6.8    7.48  )-----------( 203      ( 30 Sep 2021 06:32 )             21.7                         6.7    8.43  )-----------( 233      ( 30 Sep 2021 00:34 )             21.5     Imaging:  < from: CT Abdomen and Pelvis w/wo IV Cont (09.30.21 @ 02:46) >  FINDINGS:  LOWER CHEST: Within normal limits.    LIVER: Within normal limits.  BILE DUCTS: Normal caliber.  GALLBLADDER: Within normal limits.  SPLEEN: Within normal limits.  PANCREAS: Within normal limits.  ADRENALS: Within normal limits.  KIDNEYS/URETERS: Symmetrical enhancement without hydronephrosis. Simple cortical cysts in both kidneys largest measuring 2.3 cm.    BLADDER: Within normal limits.  REPRODUCTIVE ORGANS: Leiomyomatous uterus with multiple bulky partially calcified fibroids.    BOWEL: No bowel obstruction. Appendix is not visualized. No evidence of inflammation in the pericecal region. There is diverticulosis without evidence of diverticulitis.  PERITONEUM: No ascites.  VESSELS: Within normal limits.  RETROPERITONEUM/LYMPH NODES: No lymphadenopathy.  ABDOMINAL WALL: Within normal limits.  BONES: Degenerative changes.    IMPRESSION:  No acute intra-abdominal or pelvic disease. No evidence of active GI bleed.    < end of copied text >

## 2021-09-30 NOTE — H&P ADULT - ATTENDING COMMENTS
71yo Female w/ mhx of uterine leiomyomas, iron deficiency anemia, HTN, recent hx of H. Pylori infection, lower back pain (taking Aleve) a/w UGIB c/b acute blood loss anemia requiring transfusion; 71yo Female w/ mhx of uterine leiomyomas, iron deficiency anemia, HTN, recent hx of H. Pylori infection, lower back pain (taking Aleve) a/w UGIB c/b acute blood loss anemia requiring transfusion;    The above assessment and plan above were supplemented and modified by attending where needed;

## 2021-09-30 NOTE — H&P ADULT - NSHPPHYSICALEXAM_GEN_ALL_CORE
VITALS:   T(C): 36.9 (09-30-21 @ 04:45), Max: 36.9 (09-30-21 @ 04:45)  HR: 87 (09-30-21 @ 04:45) (84 - 90)  BP: 121/82 (09-30-21 @ 04:45) (115/54 - 131/59)  RR: 20 (09-30-21 @ 04:45) (16 - 20)  SpO2: 100% (09-30-21 @ 04:45) (100% - 100%)    PHYSICAL EXAM:     GENERAL: NAD, lying in bed comfortably  HEAD:  Atraumatic, Normocephalic  EYES: EOMI, PERRLA, conjunctiva and sclera clear  ENT: Moist mucous membranes  NECK: Supple, No JVD  CHEST/LUNG: Clear to auscultation bilaterally; No rales, rhonchi, wheezing, or rubs. Unlabored respirations  HEART: Regular rate and rhythm; No murmurs, rubs, or gallops  ABDOMEN: normal bowel sounds; Soft, nontender, nondistended  EXTREMITIES:  2+ Peripheral Pulses, brisk capillary refill. No clubbing, cyanosis, or edema  Neurological:  A&Ox3, no focal deficits   SKIN: No rashes or lesions  PSYCH: normal affect and mood Vital Signs Last 24 Hrs  T(C): 36.9 (30 Sep 2021 06:23), Max: 36.9 (30 Sep 2021 04:45)  T(F): 98.4 (30 Sep 2021 06:23), Max: 98.4 (30 Sep 2021 04:45)  HR: 79 (30 Sep 2021 06:23) (79 - 90)  BP: 126/68 (30 Sep 2021 06:23) (115/54 - 131/59)  BP(mean): --  RR: 20 (30 Sep 2021 06:23) (16 - 20)  SpO2: 100% (30 Sep 2021 06:23) (100% - 100%)    PHYSICAL EXAM:     GENERAL: NAD, lying in bed comfortably  HEAD:  Atraumatic, Normocephalic  EYES: EOMI, PERRLA, conjunctiva and sclera clear  ENT: Moist mucous membranes  NECK: Supple, No JVD  CHEST/LUNG: Clear to auscultation bilaterally; No rales, rhonchi, wheezing, or rubs. Unlabored respirations  HEART: Regular rate and rhythm; No murmurs, rubs, or gallops  ABDOMEN: normal bowel sounds; Soft, nontender, nondistended  EXTREMITIES:  2+ Peripheral Pulses, brisk capillary refill. No clubbing, cyanosis, or edema  Neurological:  A&Ox3, no focal deficits   SKIN: No rashes or lesions  PSYCH: normal affect and mood

## 2021-09-30 NOTE — H&P ADULT - PROBLEM SELECTOR PROBLEM 3
Type 2 diabetes mellitus Type 2 diabetes mellitus with hyperglycemia, without long-term current use of insulin Lumbosacral radiculopathy

## 2021-09-30 NOTE — H&P ADULT - NSHPSOCIALHISTORY_GEN_ALL_CORE
ocassional alcohol use, no smoking or illicit drug use Social use of alcohol, 1 drink on weekends or less  Reports no smoking or illicit drug use  Works as   Lives alone

## 2021-09-30 NOTE — H&P ADULT - PROBLEM SELECTOR PLAN 2
Hemoglobin 6.7 on admission , likely secondary to hemorrhage and iron deficiency   - denies any episodes of hematuria, hematochezia or hematemesis   - received 2 units pRBC in ED  - follow up iron studies / ferritin  - monitor for GI bleeds   - maintain active type and screen  - transfusion goals > 7 Hemoglobin 6.7 on admission , likely secondary to hemorrhage and iron deficiency   - denies any episodes of hematuria, hematochezia or hematemesis   - received 2 units pRBC in ED  - monitor for GI bleeds   - maintain active type and screen  - transfusion goals > 7

## 2021-09-30 NOTE — H&P ADULT - PROBLEM SELECTOR PROBLEM 4
Fibroids Back pain Type 2 diabetes mellitus with hyperglycemia, without long-term current use of insulin

## 2021-09-30 NOTE — CONSULT NOTE ADULT - ATTENDING COMMENTS
70F here w/ melena and acute post hemorrhagic anemia in setting of NSAID use.   Hemodynamically stable.   Currently receiving prbc transfusion.   Will plan for EGD after transfusion and optimization.   Continue IV PPI BID   Keep NPO  Further recs following procedure.   Please call with questions.

## 2021-10-01 ENCOUNTER — TRANSCRIPTION ENCOUNTER (OUTPATIENT)
Age: 71
End: 2021-10-01

## 2021-10-01 DIAGNOSIS — M54.50 LOW BACK PAIN, UNSPECIFIED: ICD-10-CM

## 2021-10-01 DIAGNOSIS — K92.2 GASTROINTESTINAL HEMORRHAGE, UNSPECIFIED: ICD-10-CM

## 2021-10-01 LAB
A1C WITH ESTIMATED AVERAGE GLUCOSE RESULT: 6.9 % — HIGH (ref 4–5.6)
ALBUMIN SERPL ELPH-MCNC: 3.4 G/DL — SIGNIFICANT CHANGE UP (ref 3.3–5)
ALP SERPL-CCNC: 59 U/L — SIGNIFICANT CHANGE UP (ref 40–120)
ALT FLD-CCNC: 9 U/L — SIGNIFICANT CHANGE UP (ref 4–33)
ANION GAP SERPL CALC-SCNC: 12 MMOL/L — SIGNIFICANT CHANGE UP (ref 7–14)
AST SERPL-CCNC: 15 U/L — SIGNIFICANT CHANGE UP (ref 4–32)
BILIRUB SERPL-MCNC: 0.2 MG/DL — SIGNIFICANT CHANGE UP (ref 0.2–1.2)
BUN SERPL-MCNC: 16 MG/DL — SIGNIFICANT CHANGE UP (ref 7–23)
CALCIUM SERPL-MCNC: 8.5 MG/DL — SIGNIFICANT CHANGE UP (ref 8.4–10.5)
CHLORIDE SERPL-SCNC: 107 MMOL/L — SIGNIFICANT CHANGE UP (ref 98–107)
CO2 SERPL-SCNC: 21 MMOL/L — LOW (ref 22–31)
COVID-19 SPIKE DOMAIN AB INTERP: POSITIVE
COVID-19 SPIKE DOMAIN ANTIBODY RESULT: >250 U/ML — HIGH
CREAT SERPL-MCNC: 0.78 MG/DL — SIGNIFICANT CHANGE UP (ref 0.5–1.3)
ESTIMATED AVERAGE GLUCOSE: 151 — SIGNIFICANT CHANGE UP
GLUCOSE BLDC GLUCOMTR-MCNC: 105 MG/DL — HIGH (ref 70–99)
GLUCOSE BLDC GLUCOMTR-MCNC: 113 MG/DL — HIGH (ref 70–99)
GLUCOSE BLDC GLUCOMTR-MCNC: 130 MG/DL — HIGH (ref 70–99)
GLUCOSE BLDC GLUCOMTR-MCNC: 131 MG/DL — HIGH (ref 70–99)
GLUCOSE BLDC GLUCOMTR-MCNC: 142 MG/DL — HIGH (ref 70–99)
GLUCOSE BLDC GLUCOMTR-MCNC: 150 MG/DL — HIGH (ref 70–99)
GLUCOSE SERPL-MCNC: 116 MG/DL — HIGH (ref 70–99)
HCT VFR BLD CALC: 24.7 % — LOW (ref 34.5–45)
HCV AB S/CO SERPL IA: 0.13 S/CO — SIGNIFICANT CHANGE UP (ref 0–0.99)
HCV AB SERPL-IMP: SIGNIFICANT CHANGE UP
HGB BLD-MCNC: 7.8 G/DL — LOW (ref 11.5–15.5)
MAGNESIUM SERPL-MCNC: 2.1 MG/DL — SIGNIFICANT CHANGE UP (ref 1.6–2.6)
MCHC RBC-ENTMCNC: 27 PG — SIGNIFICANT CHANGE UP (ref 27–34)
MCHC RBC-ENTMCNC: 31.6 GM/DL — LOW (ref 32–36)
MCV RBC AUTO: 85.5 FL — SIGNIFICANT CHANGE UP (ref 80–100)
NRBC # BLD: 0 /100 WBCS — SIGNIFICANT CHANGE UP
NRBC # FLD: 0 K/UL — SIGNIFICANT CHANGE UP
PHOSPHATE SERPL-MCNC: 2.7 MG/DL — SIGNIFICANT CHANGE UP (ref 2.5–4.5)
PLATELET # BLD AUTO: 203 K/UL — SIGNIFICANT CHANGE UP (ref 150–400)
POTASSIUM SERPL-MCNC: 4.1 MMOL/L — SIGNIFICANT CHANGE UP (ref 3.5–5.3)
POTASSIUM SERPL-SCNC: 4.1 MMOL/L — SIGNIFICANT CHANGE UP (ref 3.5–5.3)
PROT SERPL-MCNC: 5.6 G/DL — LOW (ref 6–8.3)
RBC # BLD: 2.89 M/UL — LOW (ref 3.8–5.2)
RBC # FLD: 18.1 % — HIGH (ref 10.3–14.5)
SARS-COV-2 IGG+IGM SERPL QL IA: >250 U/ML — HIGH
SARS-COV-2 IGG+IGM SERPL QL IA: POSITIVE
SODIUM SERPL-SCNC: 140 MMOL/L — SIGNIFICANT CHANGE UP (ref 135–145)
WBC # BLD: 5.55 K/UL — SIGNIFICANT CHANGE UP (ref 3.8–10.5)
WBC # FLD AUTO: 5.55 K/UL — SIGNIFICANT CHANGE UP (ref 3.8–10.5)

## 2021-10-01 PROCEDURE — 99233 SBSQ HOSP IP/OBS HIGH 50: CPT | Mod: GC

## 2021-10-01 PROCEDURE — 45378 DIAGNOSTIC COLONOSCOPY: CPT | Mod: GC

## 2021-10-01 RX ORDER — DEXTROSE 50 % IN WATER 50 %
25 SYRINGE (ML) INTRAVENOUS ONCE
Refills: 0 | Status: DISCONTINUED | OUTPATIENT
Start: 2021-10-01 | End: 2021-10-05

## 2021-10-01 RX ORDER — SODIUM CHLORIDE 9 MG/ML
1000 INJECTION, SOLUTION INTRAVENOUS
Refills: 0 | Status: DISCONTINUED | OUTPATIENT
Start: 2021-10-01 | End: 2021-10-05

## 2021-10-01 RX ORDER — DEXTROSE 50 % IN WATER 50 %
12.5 SYRINGE (ML) INTRAVENOUS ONCE
Refills: 0 | Status: DISCONTINUED | OUTPATIENT
Start: 2021-10-01 | End: 2021-10-05

## 2021-10-01 RX ORDER — INSULIN LISPRO 100/ML
VIAL (ML) SUBCUTANEOUS
Refills: 0 | Status: DISCONTINUED | OUTPATIENT
Start: 2021-10-01 | End: 2021-10-05

## 2021-10-01 RX ORDER — DEXTROSE 50 % IN WATER 50 %
15 SYRINGE (ML) INTRAVENOUS ONCE
Refills: 0 | Status: DISCONTINUED | OUTPATIENT
Start: 2021-10-01 | End: 2021-10-05

## 2021-10-01 RX ORDER — LIDOCAINE 4 G/100G
1 CREAM TOPICAL DAILY
Refills: 0 | Status: DISCONTINUED | OUTPATIENT
Start: 2021-10-01 | End: 2021-10-05

## 2021-10-01 RX ORDER — GLUCAGON INJECTION, SOLUTION 0.5 MG/.1ML
1 INJECTION, SOLUTION SUBCUTANEOUS ONCE
Refills: 0 | Status: DISCONTINUED | OUTPATIENT
Start: 2021-10-01 | End: 2021-10-05

## 2021-10-01 RX ORDER — INSULIN LISPRO 100/ML
VIAL (ML) SUBCUTANEOUS AT BEDTIME
Refills: 0 | Status: DISCONTINUED | OUTPATIENT
Start: 2021-10-01 | End: 2021-10-05

## 2021-10-01 RX ORDER — PANTOPRAZOLE SODIUM 20 MG/1
40 TABLET, DELAYED RELEASE ORAL
Refills: 0 | Status: DISCONTINUED | OUTPATIENT
Start: 2021-10-01 | End: 2021-10-05

## 2021-10-01 RX ORDER — SODIUM CHLORIDE 9 MG/ML
1000 INJECTION, SOLUTION INTRAVENOUS
Refills: 0 | Status: DISCONTINUED | OUTPATIENT
Start: 2021-10-01 | End: 2021-10-01

## 2021-10-01 RX ADMIN — Medication 650 MILLIGRAM(S): at 23:15

## 2021-10-01 RX ADMIN — SODIUM CHLORIDE 100 MILLILITER(S): 9 INJECTION, SOLUTION INTRAVENOUS at 09:57

## 2021-10-01 RX ADMIN — LIDOCAINE 1 PATCH: 4 CREAM TOPICAL at 19:00

## 2021-10-01 RX ADMIN — LIDOCAINE 1 PATCH: 4 CREAM TOPICAL at 18:58

## 2021-10-01 NOTE — DISCHARGE NOTE PROVIDER - HOSPITAL COURSE
HPI:    71yo Female w/ mhx of uterine leiomyomas, iron deficiency anemia, HTN presents with melena and diarrhea for the past 2 days. States that she first noticed some bright red blood per rectum two days ago that turned into melanotic diarrhea. Has had approximately 3 episodes of melena yesterday, and went to see her GI doctor, Dr. Darline Gordillo, this morning who referred her for admission for urgent endoscopy for GI bleed. Endorses concomitant fatigue/ lightheadedness and mild skin pallor. Of note, patient has been complaining of Rt lower back pain recently and has taken approximately 600 mg of aleve daily for the past 3 days. Has had H. Pylori infection earlier this year that was treated with resolution of infection on repeat stool antigen testing. Pt reports one incidence of GI bleed 10yrs ago for which she required blood transfusion (states she doesn't remember what source of bleed was). Reports that she had a colonoscopy completed 3-4 months ago as well as a capsule study; patient was unsure why she required a capsule study, but was noted to have an endoscopy with Dr. Gordillo in October. States that she had one polyp in the past that was resected and has internal hemorrhoids. Denies any vaginal bleeding/discharge, is scheduled for gynecology procedure end of this month for her fibroids. Denies any pain, nausea, vomiting, fevers, chills, chest pain/palpitations, shortness of breath, constipation recent sick contacts or travel. Has been compliant with all her medications, including her protonix 40 mg PO daily. Family history of pancreatic cancer in father age 77, and colon cancer in mother age 81.       Hospital Course:    Patient was found to have hgb of 6.7. Patient was transfused 2 units of pRBCs. Interval CBC demonstrated elevation to 8.3. GI was consulted, and performed an EGD, which was negative for PUD, and only significant for esophagitis. A colonoscopy was performed, which was significant for diverticulosis, which did not have evidence of active bleeding, however is likely the source of the patient's GIB. Post-colonoscopy, the patient did not have any more episodes of melanotic stool or significant hematochezia. The patient's melanotic stool was likely in the setting of a diverticular bleed that self resolved. The patient's  Hgb was above 7 on discharge, not requiring any additional transfusions. The patient was asymptomatic at the time of discharge, no headaches or dizziness.    The patient was recommended to follow up with her gastroenterologist Dr. Gordillo for further management. The patient was also recommended to follow up with Dr. Flynn for long-term management of her severe anemia. While in patient, her iron studies were normal, however this was in the setting of recent iron infusions. Recommend further work up if anemia persists. HPI:    71yo Female w/ mhx of uterine leiomyomas, iron deficiency anemia, HTN presents with melena and diarrhea for the past 2 days. States that she first noticed some bright red blood per rectum two days ago that turned into melanotic diarrhea. Has had approximately 3 episodes of melena yesterday, and went to see her GI doctor, Dr. Darline Gordillo, this morning who referred her for admission for urgent endoscopy for GI bleed. Endorses concomitant fatigue/ lightheadedness and mild skin pallor. Of note, patient has been complaining of Rt lower back pain recently and has taken approximately 600 mg of aleve daily for the past 3 days. Has had H. Pylori infection earlier this year that was treated with resolution of infection on repeat stool antigen testing. Pt reports one incidence of GI bleed 10yrs ago for which she required blood transfusion (states she doesn't remember what source of bleed was). Reports that she had a colonoscopy completed 3-4 months ago as well as a capsule study; patient was unsure why she required a capsule study, but was noted to have an endoscopy with Dr. Gordillo in October. States that she had one polyp in the past that was resected and has internal hemorrhoids. Denies any vaginal bleeding/discharge, is scheduled for gynecology procedure end of this month for her fibroids. Denies any pain, nausea, vomiting, fevers, chills, chest pain/palpitations, shortness of breath, constipation recent sick contacts or travel. Has been compliant with all her medications, including her protonix 40 mg PO daily. Family history of pancreatic cancer in father age 77, and colon cancer in mother age 81.       Hospital Course:    Patient was found to have hgb of 6.7. Patient was transfused 2 units of pRBCs. Interval CBC demonstrated elevation to 8.3. GI was consulted, and performed an EGD, which was negative for PUD, and only significant for esophagitis. A colonoscopy was performed, which was significant for diverticulosis, which did not have evidence of active bleeding, however is likely the source of the patient's GIB. Post-colonoscopy, the patient did not have any more episodes of melanotic stool or significant hematochezia. The patient's melanotic stool was likely in the setting of a diverticular bleed that self resolved. The patient's  Hgb was above 7 on discharge, not requiring any additional transfusions. The patient was asymptomatic at the time of discharge, no headaches or dizziness.    The patient was recommended to follow up with her gastroenterologist Dr. Gordillo in 2 weeks. The patient will need a repeat colonoscopy for screening of CRC given the patient's suboptimal prep. The patient was also recommended to follow up with Dr. Flynn for long-term management of her severe anemia. While in patient, her iron studies were normal, however this was in the setting of recent iron infusions. Recommend further work up if anemia persists. HPI:    71yo Female w/ mhx of uterine leiomyomas, iron deficiency anemia, HTN presents with melena and diarrhea for the past 2 days. States that she first noticed some bright red blood per rectum two days ago that turned into melanotic diarrhea. Has had approximately 3 episodes of melena yesterday, and went to see her GI doctor, Dr. Darline Gordillo, this morning who referred her for admission for urgent endoscopy for GI bleed. Endorses concomitant fatigue/ lightheadedness and mild skin pallor. Of note, patient has been complaining of Rt lower back pain recently and has taken approximately 600 mg of aleve daily for the past 3 days. Has had H. Pylori infection earlier this year that was treated with resolution of infection on repeat stool antigen testing. Pt reports one incidence of GI bleed 10yrs ago for which she required blood transfusion (states she doesn't remember what source of bleed was). Reports that she had a colonoscopy completed 3-4 months ago as well as a capsule study; patient was unsure why she required a capsule study, but was noted to have an endoscopy with Dr. Gordillo in October. States that she had one polyp in the past that was resected and has internal hemorrhoids. Denies any vaginal bleeding/discharge, is scheduled for gynecology procedure end of this month for her fibroids. Denies any pain, nausea, vomiting, fevers, chills, chest pain/palpitations, shortness of breath, constipation recent sick contacts or travel. Has been compliant with all her medications, including her protonix 40 mg PO daily. Family history of pancreatic cancer in father age 77, and colon cancer in mother age 81.       Hospital Course:    Patient was found to have hgb of 6.7. Patient was transfused 2 units of pRBCs. Interval CBC demonstrated elevation to 8.3. GI was consulted, and performed an EGD, which was negative for PUD, and only significant for esophagitis. A colonoscopy was performed, which was significant for diverticulosis, which did not have evidence of active bleeding, however is likely the source of the patient's GIB. Post-colonoscopy, the patient did not have any more episodes of melanotic stool or significant hematochezia. The patient's melanotic stool was likely in the setting of a diverticular bleed that self resolved. Despite the colonoscopy, the patient had persistent stool w/ streaks of bright red blood. Her hgb remained 7.5-8, however due to the persistent hematochezia, the patient was closely monitored by GI, w/ plans for possible repeat colonoscopy vs VCE for persistent bleeding. However, from 10/4-10/5 the patient did not have any signs of significant GIB, w/ a stable Hgb. She was cleared for discharge by gastroenterology. Her persistent streaks of blood that she was noting could be in the setting of existing internal hemorrhoids seen on her colonoscopy.     The patient was recommended to follow up with her gastroenterologist Dr. Gordillo in 1 week. The patient will need a repeat colonoscopy for screening of CRC given the patient's suboptimal prep. The patient was also recommended to follow up with Dr. Flynn for long-term management of her severe anemia. While in patient, her iron studies were normal, however this was in the setting of recent iron infusions. Recommend further work up if anemia persists.

## 2021-10-01 NOTE — DISCHARGE NOTE PROVIDER - NSDCCPCAREPLAN_GEN_ALL_CORE_FT
PRINCIPAL DISCHARGE DIAGNOSIS  Diagnosis: GI bleed  Assessment and Plan of Treatment: You were admitted because you were having large amount of stool that contained blood. When you arrived your hemoglobin levels were 6.8, and because of that you were transfused 2 units of blood. You responded well and your Hgb went up to 8.3. Our gastroenterologists did an endoscopy, which did not show any source of bleeding, just some evidence of inflammation in your esophagus. They did a colonoscopy that showed diverticulosis, which they determined was the likely source of your bleeding. There was no evidence of active bleeding so they did not perform any interventions.  Your hemoglobin remained stable and you did not have any further bloody stools so you were discharged home.  Medication Changes:  PLEASE START TAKIN. Cyclobezeprine 5 mg - for your lower back pain, which is likely just some stiffness in the muscles and swelling in the soft tissue. If this does not help you in the long term it's okay to stop the medication.   Please make sure to follow up with all the clinics listed in the "Follow-up" section of your discharge paperwork. If an appointment has been made for you, please make sure to attend the appointment, and call the clinic if you would like to reschedule. If an appointment has not bee made for you, please call the clinic in the number listed to make the appointment.  Please make sure to follow up with your primary care physician within the next 1-2 weeks to follow up on your condition.  Please return to the emergency room if you experience persistent fever, chills, nausea, vomiting, shortness of breath, lightheadedness/syncope.      SECONDARY DISCHARGE DIAGNOSES  Diagnosis: Symptomatic anemia  Assessment and Plan of Treatment:      PRINCIPAL DISCHARGE DIAGNOSIS  Diagnosis: GI bleed  Assessment and Plan of Treatment: You were admitted because you were having large amount of stool that contained blood. When you arrived your hemoglobin levels were 6.8, and because of that you were transfused 2 units of blood. You responded well and your Hgb went up to 8.3. Our gastroenterologists did an endoscopy, which did not show any source of bleeding, just some evidence of inflammation in your esophagus. They did a colonoscopy that showed diverticulosis, which they determined was the likely source of your bleeding. There was no evidence of active bleeding so they did not perform any interventions.  Your hemoglobin remained stable and you did not have any further bloody stools so you were discharged home. Please make sure to eat a high fiber diet to prevent the recurrence of your bleeding due to your diverticulosis.   Medication Changes:  PLEASE START TAKIN. Cyclobezeprine 5 mg - for your lower back pain, which is likely just some stiffness in the muscles and swelling in the soft tissue. If this does not help you in the long term it's okay to stop the medication.   Please make sure to follow up with all the clinics listed in the "Follow-up" section of your discharge paperwork. If an appointment has been made for you, please make sure to attend the appointment, and call the clinic if you would like to reschedule. If an appointment has not bee made for you, please call the clinic in the number listed to make the appointment.  Please make sure to follow up with your primary care physician within the next 1-2 weeks to follow up on your condition.  Please return to the emergency room if you experience persistent fever, chills, nausea, vomiting, shortness of breath, lightheadedness/syncope.      SECONDARY DISCHARGE DIAGNOSES  Diagnosis: Symptomatic anemia  Assessment and Plan of Treatment:      PRINCIPAL DISCHARGE DIAGNOSIS  Diagnosis: GI bleed  Assessment and Plan of Treatment: You were admitted because you were having large amount of stool that contained blood. When you arrived your hemoglobin levels were 6.8, and because of that you were transfused 2 units of blood. You responded well and your Hgb went up to 8.3. Our gastroenterologists did an endoscopy, which did not show any source of bleeding, just some evidence of inflammation in your esophagus. They did a colonoscopy that showed diverticulosis, which they determined was the likely source of your bleeding. There was no evidence of active bleeding so they did not perform any interventions.  Your hemoglobin remained stable and you did not have any further bloody stools so you were discharged home. Please make sure to eat a high fiber diet to prevent the recurrence of your bleeding due to your diverticulosis.   Medication Changes:  PLEASE START TAKIN. Cyclobezeprine 5 mg - for your lower back pain, which is likely just some stiffness in the muscles and swelling in the soft tissue. If this does not help you in the long term it's okay to stop the medication.   Please make sure to follow up with all the clinics listed in the "Follow-up" section of your discharge paperwork. If an appointment has been made for you, please make sure to attend the appointment, and call the clinic if you would like to reschedule. If an appointment has not bee made for you, please call the clinic in the number listed to make the appointment.  Please make sure to follow up with your primary care physician within the next 1-2 weeks to follow up on your condition.  Please return to the emergency room if you experience persistent fever, chills, nausea, vomiting, shortness of breath, lightheadedness/syncope.      SECONDARY DISCHARGE DIAGNOSES  Diagnosis: Symptomatic anemia  Assessment and Plan of Treatment: Please follow up with Dr. Flynn for your severe anemia. This may be due to your history of Iron deficiency anemia as well as the blood loss you had, but if the anemia persists you may need further work up. Please follow up with him in the next 1-2 weeks.     PRINCIPAL DISCHARGE DIAGNOSIS  Diagnosis: GI bleed  Assessment and Plan of Treatment: You were admitted because you were having large amount of stool that contained blood. When you arrived your hemoglobin levels were 6.8, and because of that you were transfused 2 units of blood. You responded well and your Hgb went up to 8.3. Our gastroenterologists did an endoscopy, which did not show any source of bleeding, just some evidence of inflammation in your esophagus. They did a colonoscopy that showed diverticulosis, which they determined was the likely source of your bleeding. There was no evidence of active bleeding so they did not perform any interventions.  Your hemoglobin remained stable despite some episodes of streaks w/ blood in your stool, so no further interventiona was decided and you were discharged home. Please make sure to eat a high fiber diet to prevent the recurrence of your bleeding due to your diverticulosis.   Medication Changes:  PLEASE START TAKIN. Cyclobezeprine 5 mg - for your lower back pain, which is likely just some stiffness in the muscles and swelling in the soft tissue. If this does not help you in the long term it's okay to stop the medication.   2. Pantoprazole 40 mg before breakfast for the next two months for the esophagitis.   Please make sure to follow up with all the clinics listed in the "Follow-up" section of your discharge paperwork. If an appointment has been made for you, please make sure to attend the appointment, and call the clinic if you would like to reschedule. If an appointment has not bee made for you, please call the clinic in the number listed to make the appointment.  Please make sure to follow up with your primary care physician within the next 1-2 weeks to follow up on your condition.  Please return to the emergency room if you experience persistent fever, chills, nausea, vomiting, shortness of breath, lightheadedness/syncope.      SECONDARY DISCHARGE DIAGNOSES  Diagnosis: Lower back pain  Assessment and Plan of Treatment: You have some lower back pain likely due to some muscle aches and soreness. Please take the cyclobenzaprine as above.    Diagnosis: Symptomatic anemia  Assessment and Plan of Treatment: Please follow up with Dr. Flynn for your severe anemia. This may be due to your history of Iron deficiency anemia as well as the blood loss you had, but if the anemia persists you may need further work up. Please follow up with him in the next 1-2 weeks.

## 2021-10-01 NOTE — PROGRESS NOTE ADULT - PROBLEM SELECTOR PLAN 3
Hx of ALFREDO, unclear etiology, may be in the setting of vaginal bleeding from leiomyomas vs occult GIB   Has been receiving weekly iron infusions - follows Dr. Flynn. as an outpatient  Per outpatient hx - patient baseline over this year is 8-8.7.   Iron studies otherwise WNL however this is within the setting of regular iron infusions  - Monitoring CBCs at this time  - Will consider further work up of anemia if patient's anemia does not resolve post GI intervention and transfusion.

## 2021-10-01 NOTE — CHART NOTE - NSCHARTNOTEFT_GEN_A_CORE
Contacted by nurse for rectal bleeding. Bright red blood on ramin. I examined pt at bedside. Pt denied chest pain. Endorsed lightheadedness, SOB, nausea. 1 episode of NBNB emesis. Patient hemodynamically stable. Vitals: T 101.5, HR 95, /108, saturating 97% on RA. Patient reported she was anxious. A&Ox4. Heart RRR, normal S1, S2, no murmurs. 2+ radial and pedal pulses, extremities warm. Lungs CTAB. Repeat vitals: HR 95, /69, saturating 100% on RA. Pt given Tylenol for fever. Ordered stat CBC. Blood cultures, urine culture and CXR ordered. Will f/u stat CBC and transfuse if Hgb<7 Contacted by nurse for rectal bleeding. Bright red blood on ramin. I examined pt at bedside. Pt denied chest pain. Endorsed lightheadedness, SOB, nausea. 1 episode of NBNB emesis. Patient hemodynamically stable. Vitals: T 101.5, HR 95, /108, saturating 97% on RA. Patient reported she was anxious. A&Ox4. Heart RRR, normal S1, S2, no murmurs. 2+ radial and pedal pulses, extremities warm. Lungs CTAB. Repeat vitals: HR 95, /69, saturating 100% on RA. Pt given Tylenol for fever. Ordered stat CBC. Blood cultures, urine culture and CXR ordered. Will f/u stat CBC and transfuse if Hgb<7    Lory Leblanc MD  Internal Medicine PGY-1 Contacted by nurse for rectal bleeding. Bright red blood on ramin. Assessed pt at bedside. Pt denied chest pain. Endorsed lightheadedness, SOB, nausea. 1 episode of NBNB emesis. Patient hemodynamically stable. Vitals: T 101.5, HR 95, /108, saturating 97% on RA. Patient reported she was anxious. A&Ox4. Heart RRR, normal S1, S2, no murmurs. 2+ radial and pedal pulses, extremities warm. Lungs CTAB. Repeat vitals: HR 95, /69, saturating 100% on RA. Pt given Tylenol for fever. Ordered stat CBC. Blood cultures, urine culture and CXR ordered. Will f/u stat CBC and transfuse if Hgb<7    Lory Leblanc MD  Internal Medicine PGY-1

## 2021-10-01 NOTE — PROGRESS NOTE ADULT - SUBJECTIVE AND OBJECTIVE BOX
Eliu Chapin MD  Internal Medicine, PGY-1  Pager: [phone] / [facility]: [phone]    SUBJECTIVE / OVERNIGHT EVENTS:  - Pt seen and examined at bedside  - Overnight the patient had several dark red bloody bowel movements associated with drinking GoLytely for her upcoming colonoscopy. A CBC was obtained yesterday and was 8.3. Since then the patient has had a few more bloody bowel movements, the last being at 4 AM, how she reported that the amount of blood present in each BM is decreasing.  - This morning the patient denied any dizziness, headaches, nausea or vomiting. She expressed surprise that the source of her bleeding was not found on the EGD, but hopes that it will be found in the colonoscopy so that she can stop having bloody BMs.     MEDICATIONS  (STANDING):  dextrose 40% Gel 15 Gram(s) Oral once  dextrose 5% + lactated ringers. 1000 milliLiter(s) (100 mL/Hr) IV Continuous <Continuous>  dextrose 5%. 1000 milliLiter(s) (50 mL/Hr) IV Continuous <Continuous>  dextrose 5%. 1000 milliLiter(s) (100 mL/Hr) IV Continuous <Continuous>  dextrose 50% Injectable 25 Gram(s) IV Push once  dextrose 50% Injectable 12.5 Gram(s) IV Push once  dextrose 50% Injectable 25 Gram(s) IV Push once  glucagon  Injectable 1 milliGRAM(s) IntraMuscular once  insulin lispro (ADMELOG) corrective regimen sliding scale   SubCutaneous every 6 hours  pantoprazole Infusion 8 mG/Hr (10 mL/Hr) IV Continuous <Continuous>    MEDICATIONS  (PRN):  acetaminophen   Tablet .. 650 milliGRAM(s) Oral every 6 hours PRN Temp greater or equal to 38C (100.4F), Mild Pain (1 - 3)  cyclobenzaprine 5 milliGRAM(s) Oral two times a day PRN Muscle Spasm  melatonin 3 milliGRAM(s) Oral at bedtime PRN Insomnia  ondansetron Injectable 4 milliGRAM(s) IV Push every 8 hours PRN Nausea and/or Vomiting      PHYSICAL EXAM:  Vital Signs Last 24 Hrs  T(C): 36.4 (01 Oct 2021 04:30), Max: 36.7 (30 Sep 2021 15:26)  T(F): 97.5 (01 Oct 2021 04:30), Max: 98 (30 Sep 2021 15:26)  HR: 78 (01 Oct 2021 04:30) (72 - 79)  BP: 136/54 (01 Oct 2021 04:30) (96/50 - 146/55)  BP(mean): --  RR: 18 (01 Oct 2021 04:30) (16 - 18)  SpO2: 100% (01 Oct 2021 04:30) (97% - 100%)    CAPILLARY BLOOD GLUCOSE      POCT Blood Glucose.: 113 mg/dL (01 Oct 2021 05:52)  POCT Blood Glucose.: 142 mg/dL (30 Sep 2021 23:32)  POCT Blood Glucose.: 115 mg/dL (30 Sep 2021 17:30)  POCT Blood Glucose.: 126 mg/dL (30 Sep 2021 16:49)  POCT Blood Glucose.: 175 mg/dL (30 Sep 2021 13:49)    I&O's Summary      CONSTITUTIONAL: NAD, well-developed  RESPIRATORY: Normal respiratory effort; lungs are clear to auscultation bilaterally  CARDIOVASCULAR: Regular rate and rhythm, normal S1 and S2, no murmur/rub/gallop; No lower extremity edema; Peripheral pulses are 2+ bilaterally  ABDOMEN: Nontender to palpation, normoactive bowel sounds, no rebound/guarding; No hepatosplenomegaly  MUSCLOSKELETAL: no clubbing or cyanosis of digits; no joint swelling or tenderness to palpation  PSYCH: A+O to person, place, and time; affect appropriate    LABS:                        7.8    5.55  )-----------( 203      ( 01 Oct 2021 07:51 )             24.7     09-30    141  |  109<H>  |  36<H>  ----------------------------<  183<H>  4.1   |  20<L>  |  1.01    Ca    8.8      30 Sep 2021 06:32  Phos  4.3     09-30  Mg     2.70     09-30    TPro  5.8<L>  /  Alb  3.6  /  TBili  <0.2  /  DBili  x   /  AST  12  /  ALT  11  /  AlkPhos  83  09-30    PT/INR - ( 30 Sep 2021 06:32 )   PT: 11.5 sec;   INR: 1.00 ratio         PTT - ( 30 Sep 2021 06:32 )  PTT:26.5 sec            IMAGING:

## 2021-10-01 NOTE — DISCHARGE NOTE PROVIDER - NSDCMRMEDTOKEN_GEN_ALL_CORE_FT
Farxiga 10 mg oral tablet: 1 tab(s) orally once a day  metFORMIN 1000 mg oral tablet: 1 tab(s) orally once a day  pantoprazole 40 mg oral delayed release tablet: 1 tab(s) orally once a day   cyclobenzaprine 5 mg oral tablet: 1 tab(s) orally 2 times a day, As needed, Muscle Spasm  Farxiga 10 mg oral tablet: 1 tab(s) orally once a day  metFORMIN 1000 mg oral tablet: 1 tab(s) orally once a day  pantoprazole 40 mg oral delayed release tablet: 1 tab(s) orally once a day

## 2021-10-01 NOTE — PROGRESS NOTE ADULT - PROBLEM SELECTOR PLAN 1
Hx of prior GIB 2/2 PUD w/ H pylori, s/p full course of tx on 3/2021. Has been taking several Alleve per day for new back pain.   EGD on 9/30 significant for Esophagitis, however no sign of active bleeding. Patient likely has LGIB vs small bowel bleeding given presence of maroon colored hematochezia. Considering diverticulosis vs angiodysplasia vs bleeding polyp  Prior GI Hx: 5/17/21 - colonoscopy 2 polyps in ascending and transverse colon. 6/2021 - Capsule study negative for bleed  Outpatient GI: Dr. Gordillo - referred patient to hospital     - Maintaining active type and screen  - Will transfuse if Hgb < 7.0   - Patient strongly advised to avoid all NSAIDs from now on   - Continuing Protonix gtt  - NPO given possible intervention  - GI plan for colonoscopy +/- VCE today Hx of prior GIB 2/2 PUD w/ H pylori, s/p full course of tx on 3/2021. Has been taking several Alleve per day for new back pain.   EGD on 9/30 significant for Esophagitis, however no sign of active bleeding. Colonoscopy 10/1 positive for diverticulosis w/o evidence of active bleeding, however this was noted to be the likely source of the patient's bleeding.   Prior GI Hx: 5/17/21 - colonoscopy 2 polyps in ascending and transverse colon. 6/2021 - Capsule study negative for bleed  Outpatient GI: Dr. Gordillo - referred patient to hospital     - Maintaining active type and screen  - Will transfuse if Hgb < 7.0   - Patient strongly advised to avoid all NSAIDs from now on   - Patient likely had self-resolving bleed from diverticulosis. Will continue to monitor the patient overnight for any new bleeding, however current melena is likely old blood. If patient continues to be HDS, will likely discharge tomorrow with outpatient follow up.

## 2021-10-01 NOTE — PROGRESS NOTE ADULT - SUBJECTIVE AND OBJECTIVE BOX
ANESTHESIA POSTOP CHECK    70y Female POSTOP DAY 1   Vital Signs Last 24 Hrs  T(C): 36.2 (01 Oct 2021 11:35), Max: 36.7 (30 Sep 2021 15:26)  T(F): 97.2 (01 Oct 2021 11:35), Max: 98 (30 Sep 2021 15:26)  HR: 83 (01 Oct 2021 12:05) (72 - 83)  BP: 136/58 (01 Oct 2021 12:05) (96/50 - 155/67)  BP(mean): --  RR: 18 (01 Oct 2021 12:05) (16 - 19)  SpO2: 100% (01 Oct 2021 12:05) (97% - 100%)  I&O's Summary      [x ] NO APPARENT ANESTHESIA COMPLICATIONS

## 2021-10-01 NOTE — DISCHARGE NOTE PROVIDER - PROVIDER TOKENS
PROVIDER:[TOKEN:[[id number]],FOLLOWUP:[2 weeks],ESTABLISHEDPATIENT:[T]],PROVIDER:[TOKEN:[[id number]],FOLLOWUP:[2 weeks],ESTABLISHEDPATIENT:[T]] PROVIDER:[TOKEN:[[id number]],FOLLOWUP:[1 week],ESTABLISHEDPATIENT:[T]],PROVIDER:[TOKEN:[[id number]],FOLLOWUP:[2 weeks],ESTABLISHEDPATIENT:[T]]

## 2021-10-01 NOTE — PROGRESS NOTE ADULT - ASSESSMENT
69yo Female w/ mhx of uterine leiomyomas, iron deficiency anemia, HTN, recent hx of H. Pylori infection, lower back pain (taking Aleve) admitted for melena and severe anemia requiring transfusions. EGD negative for source of GIB. Patient pending colonoscopy and possible VCE today.

## 2021-10-01 NOTE — DISCHARGE NOTE PROVIDER - CARE PROVIDER_API CALL
Darline Gordillo)  Gastroenterology; Internal Medicine  [address] [place] [address]  Phone: [phone]  Fax: [phone]  Established Patient  Follow Up Time: 2 weeks    Brendon Flynn)  HematologyOncology; Internal Medicine; Medical Oncology  [address] [facility], [address]  [place], [place] [address]  Phone: [phone]  Fax: [phone]  Established Patient  Follow Up Time: 2 weeks   Darline Gordillo)  Gastroenterology; Internal Medicine  [address] [place] [address]  Phone: [phone]  Fax: [phone]  Established Patient  Follow Up Time: 1 week    Brendon Flynn)  HematologyOncology; Internal Medicine; Medical Oncology  [address] [facility], [address]  [place], [place] [id number]  Phone: [phone]  Fax: [phone]  Established Patient  Follow Up Time: 2 weeks

## 2021-10-01 NOTE — DISCHARGE NOTE PROVIDER - CARE PROVIDERS DIRECT ADDRESSES
[email or web address]

## 2021-10-02 LAB
ANION GAP SERPL CALC-SCNC: 9 MMOL/L — SIGNIFICANT CHANGE UP (ref 7–14)
APPEARANCE UR: CLEAR — SIGNIFICANT CHANGE UP
BACTERIA # UR AUTO: NEGATIVE — SIGNIFICANT CHANGE UP
BASOPHILS # BLD AUTO: 0.01 K/UL — SIGNIFICANT CHANGE UP (ref 0–0.2)
BASOPHILS NFR BLD AUTO: 0.2 % — SIGNIFICANT CHANGE UP (ref 0–2)
BILIRUB UR-MCNC: NEGATIVE — SIGNIFICANT CHANGE UP
BUN SERPL-MCNC: 11 MG/DL — SIGNIFICANT CHANGE UP (ref 7–23)
CALCIUM SERPL-MCNC: 8.6 MG/DL — SIGNIFICANT CHANGE UP (ref 8.4–10.5)
CHLORIDE SERPL-SCNC: 107 MMOL/L — SIGNIFICANT CHANGE UP (ref 98–107)
CO2 SERPL-SCNC: 23 MMOL/L — SIGNIFICANT CHANGE UP (ref 22–31)
COLOR SPEC: SIGNIFICANT CHANGE UP
CREAT SERPL-MCNC: 0.85 MG/DL — SIGNIFICANT CHANGE UP (ref 0.5–1.3)
DIFF PNL FLD: ABNORMAL
EOSINOPHIL # BLD AUTO: 0.06 K/UL — SIGNIFICANT CHANGE UP (ref 0–0.5)
EOSINOPHIL NFR BLD AUTO: 0.9 % — SIGNIFICANT CHANGE UP (ref 0–6)
EPI CELLS # UR: 1 /HPF — SIGNIFICANT CHANGE UP (ref 0–5)
GLUCOSE BLDC GLUCOMTR-MCNC: 140 MG/DL — HIGH (ref 70–99)
GLUCOSE BLDC GLUCOMTR-MCNC: 143 MG/DL — HIGH (ref 70–99)
GLUCOSE BLDC GLUCOMTR-MCNC: 161 MG/DL — HIGH (ref 70–99)
GLUCOSE BLDC GLUCOMTR-MCNC: 198 MG/DL — HIGH (ref 70–99)
GLUCOSE SERPL-MCNC: 162 MG/DL — HIGH (ref 70–99)
GLUCOSE UR QL: ABNORMAL
HCT VFR BLD CALC: 22.4 % — LOW (ref 34.5–45)
HCT VFR BLD CALC: 23.3 % — LOW (ref 34.5–45)
HCT VFR BLD CALC: 25.3 % — LOW (ref 34.5–45)
HGB BLD-MCNC: 7.1 G/DL — LOW (ref 11.5–15.5)
HGB BLD-MCNC: 7.8 G/DL — LOW (ref 11.5–15.5)
HGB BLD-MCNC: 8.1 G/DL — LOW (ref 11.5–15.5)
HYALINE CASTS # UR AUTO: 2 /LPF — SIGNIFICANT CHANGE UP (ref 0–7)
IANC: 4.06 K/UL — SIGNIFICANT CHANGE UP (ref 1.5–8.5)
IMM GRANULOCYTES NFR BLD AUTO: 0.3 % — SIGNIFICANT CHANGE UP (ref 0–1.5)
KETONES UR-MCNC: ABNORMAL
LEUKOCYTE ESTERASE UR-ACNC: NEGATIVE — SIGNIFICANT CHANGE UP
LYMPHOCYTES # BLD AUTO: 1.6 K/UL — SIGNIFICANT CHANGE UP (ref 1–3.3)
LYMPHOCYTES # BLD AUTO: 25.3 % — SIGNIFICANT CHANGE UP (ref 13–44)
MAGNESIUM SERPL-MCNC: 1.7 MG/DL — SIGNIFICANT CHANGE UP (ref 1.6–2.6)
MCHC RBC-ENTMCNC: 27.4 PG — SIGNIFICANT CHANGE UP (ref 27–34)
MCHC RBC-ENTMCNC: 27.4 PG — SIGNIFICANT CHANGE UP (ref 27–34)
MCHC RBC-ENTMCNC: 28.6 PG — SIGNIFICANT CHANGE UP (ref 27–34)
MCHC RBC-ENTMCNC: 31.7 GM/DL — LOW (ref 32–36)
MCHC RBC-ENTMCNC: 32 GM/DL — SIGNIFICANT CHANGE UP (ref 32–36)
MCHC RBC-ENTMCNC: 33.5 GM/DL — SIGNIFICANT CHANGE UP (ref 32–36)
MCV RBC AUTO: 85.3 FL — SIGNIFICANT CHANGE UP (ref 80–100)
MCV RBC AUTO: 85.5 FL — SIGNIFICANT CHANGE UP (ref 80–100)
MCV RBC AUTO: 86.5 FL — SIGNIFICANT CHANGE UP (ref 80–100)
MONOCYTES # BLD AUTO: 0.58 K/UL — SIGNIFICANT CHANGE UP (ref 0–0.9)
MONOCYTES NFR BLD AUTO: 9.2 % — SIGNIFICANT CHANGE UP (ref 2–14)
NEUTROPHILS # BLD AUTO: 4.06 K/UL — SIGNIFICANT CHANGE UP (ref 1.8–7.4)
NEUTROPHILS NFR BLD AUTO: 64.1 % — SIGNIFICANT CHANGE UP (ref 43–77)
NITRITE UR-MCNC: NEGATIVE — SIGNIFICANT CHANGE UP
NRBC # BLD: 0 /100 WBCS — SIGNIFICANT CHANGE UP
NRBC # FLD: 0 K/UL — SIGNIFICANT CHANGE UP
PH UR: 7 — SIGNIFICANT CHANGE UP (ref 5–8)
PHOSPHATE SERPL-MCNC: 4.2 MG/DL — SIGNIFICANT CHANGE UP (ref 2.5–4.5)
PLATELET # BLD AUTO: 182 K/UL — SIGNIFICANT CHANGE UP (ref 150–400)
PLATELET # BLD AUTO: 189 K/UL — SIGNIFICANT CHANGE UP (ref 150–400)
PLATELET # BLD AUTO: 196 K/UL — SIGNIFICANT CHANGE UP (ref 150–400)
POTASSIUM SERPL-MCNC: 4.2 MMOL/L — SIGNIFICANT CHANGE UP (ref 3.5–5.3)
POTASSIUM SERPL-SCNC: 4.2 MMOL/L — SIGNIFICANT CHANGE UP (ref 3.5–5.3)
PROT UR-MCNC: ABNORMAL
RBC # BLD: 2.59 M/UL — LOW (ref 3.8–5.2)
RBC # BLD: 2.73 M/UL — LOW (ref 3.8–5.2)
RBC # BLD: 2.96 M/UL — LOW (ref 3.8–5.2)
RBC # FLD: 17.4 % — HIGH (ref 10.3–14.5)
RBC # FLD: 17.6 % — HIGH (ref 10.3–14.5)
RBC # FLD: 17.7 % — HIGH (ref 10.3–14.5)
RBC CASTS # UR COMP ASSIST: 3 /HPF — SIGNIFICANT CHANGE UP (ref 0–4)
SODIUM SERPL-SCNC: 139 MMOL/L — SIGNIFICANT CHANGE UP (ref 135–145)
SP GR SPEC: 1.01 — SIGNIFICANT CHANGE UP (ref 1–1.05)
UROBILINOGEN FLD QL: SIGNIFICANT CHANGE UP
WBC # BLD: 6.33 K/UL — SIGNIFICANT CHANGE UP (ref 3.8–10.5)
WBC # BLD: 9.37 K/UL — SIGNIFICANT CHANGE UP (ref 3.8–10.5)
WBC # BLD: 9.67 K/UL — SIGNIFICANT CHANGE UP (ref 3.8–10.5)
WBC # FLD AUTO: 6.33 K/UL — SIGNIFICANT CHANGE UP (ref 3.8–10.5)
WBC # FLD AUTO: 9.37 K/UL — SIGNIFICANT CHANGE UP (ref 3.8–10.5)
WBC # FLD AUTO: 9.67 K/UL — SIGNIFICANT CHANGE UP (ref 3.8–10.5)
WBC UR QL: 1 /HPF — SIGNIFICANT CHANGE UP (ref 0–5)

## 2021-10-02 PROCEDURE — 99233 SBSQ HOSP IP/OBS HIGH 50: CPT | Mod: GC

## 2021-10-02 RX ADMIN — LIDOCAINE 1 PATCH: 4 CREAM TOPICAL at 23:56

## 2021-10-02 RX ADMIN — Medication 650 MILLIGRAM(S): at 00:03

## 2021-10-02 RX ADMIN — Medication 1: at 17:53

## 2021-10-02 RX ADMIN — LIDOCAINE 1 PATCH: 4 CREAM TOPICAL at 19:11

## 2021-10-02 RX ADMIN — LIDOCAINE 1 PATCH: 4 CREAM TOPICAL at 06:57

## 2021-10-02 RX ADMIN — LIDOCAINE 1 PATCH: 4 CREAM TOPICAL at 12:30

## 2021-10-02 RX ADMIN — PANTOPRAZOLE SODIUM 40 MILLIGRAM(S): 20 TABLET, DELAYED RELEASE ORAL at 06:21

## 2021-10-02 NOTE — PROGRESS NOTE ADULT - ASSESSMENT
69yo Female w/ mhx of uterine leiomyomas, iron deficiency anemia, HTN, recent hx of H. Pylori infection, lower back pain (taking Aleve) admitted for melena and severe anemia requiring transfusions. EGD negative for source of GIB and colonoscopy showed diverticulosis as likely culprit of acute bleeding.        69yo Female w/ mhx of uterine leiomyomas, iron deficiency anemia, HTN, recent hx of H. Pylori infection, lower back pain (taking Aleve) admitted for melena and severe anemia requiring transfusions. EGD negative for source of GIB and colonoscopy showed diverticulosis as likely culprit of acute bleeding. Patient continues to have BRBPR.

## 2021-10-02 NOTE — PROGRESS NOTE ADULT - PROBLEM SELECTOR PLAN 1
Hx of prior GIB 2/2 PUD w/ H pylori, s/p full course of tx on 3/2021. Has been taking several Alleve per day for new back pain.   EGD on 9/30 significant for Esophagitis, however no sign of active bleeding. Colonoscopy 10/1 positive for diverticulosis w/o evidence of active bleeding, however this was noted to be the likely source of the patient's bleeding.   Prior GI Hx: 5/17/21 - colonoscopy 2 polyps in ascending and transverse colon. 6/2021 - Capsule study negative for bleed  Outpatient GI: Dr. Gordillo - referred patient to hospital     - Maintaining active type and screen  - Will transfuse if Hgb < 7.0   - Patient strongly advised to avoid all NSAIDs from now on   - Patient likely had self-resolving bleed from diverticulosis.  - Overnight had 1 bloody bowel movement although with stable hemoglobin and HDS Hx of prior GIB 2/2 PUD w/ H pylori, s/p full course of tx on 3/2021. Has been taking several Alleve per day for new back pain. Continues to have active bleeding.   - GI rec's appreciated   - Continue to monitor patient for bleeding, repeat CBC in pm   - EGD on 9/30 significant for Esophagitis, however no sign of active bleeding  - Colonoscopy 10/1 positive for diverticulosis w/o evidence of active bleeding, however this was noted to be the likely source of the patient's bleeding.   - Prior GI Hx: 5/17/21 - colonoscopy 2 polyps in ascending and transverse colon. 6/2021 - Capsule study negative for bleed_ Outpatient GI: Dr. Gordillo - referred patient to hospital   - Maintaining active type and screen  - Will transfuse if Hgb < 7.0   - Patient strongly advised to avoid all NSAIDs from now on   - Overnight had 1 bloody bowel movement although with stable hemoglobin and HDS

## 2021-10-02 NOTE — PROGRESS NOTE ADULT - SUBJECTIVE AND OBJECTIVE BOX
ANESTHESIA POSTOP CHECK    70y Female POSTOP DAY 1 S/P general anesthesia for colonoscopy on 10/1/2021    Vital Signs Last 24 Hrs  T(C): 36.8 (02 Oct 2021 05:50), Max: 38.7 (01 Oct 2021 22:56)  T(F): 98.3 (02 Oct 2021 05:50), Max: 101.6 (01 Oct 2021 22:56)  HR: 82 (02 Oct 2021 05:50) (76 - 100)  BP: 128/72 (02 Oct 2021 05:50) (106/55 - 151/71)  BP(mean): --  RR: 16 (02 Oct 2021 05:50) (16 - 19)  SpO2: 99% (02 Oct 2021 05:50) (97% - 100%)  I&O's Summary       NO APPARENT ANESTHESIA COMPLICATIONS      Comments:

## 2021-10-02 NOTE — PROGRESS NOTE ADULT - SUBJECTIVE AND OBJECTIVE BOX
Mark Caballero, PGY-1  Internal Medicine  Pager: QS - [phone] and VOE - [phone]    PROGRESS NOTE:    ID #:     Patient is a 70y old  Female who presents with a chief complaint of Referred for GI bleed by gastroenterologist (02 Oct 2021 10:32)      MAJOR INTERVAL HOSPITAL EVENTS:     SUBJECTIVE / OVERNIGHT EVENTS: Overnight episode of BRBPR.     REVIEW OF SYSTEMS:  CONSTITUTIONAL: No weakness, fevers or chills  EYES/ENT: No visual changes;  No vertigo or throat pain   NECK: No pain or stiffness  RESPIRATORY: No cough, wheezing, hemoptysis; No shortness of breath  CARDIOVASCULAR: No chest pain or palpitations  GASTROINTESTINAL: No abdominal or epigastric pain. No nausea, vomiting, or hematemesis; No diarrhea or constipation. +  hematochezia.  GENITOURINARY: No dysuria, frequency or hematuria  NEUROLOGICAL: No numbness or weakness  All other review of systems is negative unless indicated above.    MEDICATIONS  (STANDING):  dextrose 40% Gel 15 Gram(s) Oral once  dextrose 5%. 1000 milliLiter(s) (50 mL/Hr) IV Continuous <Continuous>  dextrose 5%. 1000 milliLiter(s) (100 mL/Hr) IV Continuous <Continuous>  dextrose 50% Injectable 25 Gram(s) IV Push once  dextrose 50% Injectable 12.5 Gram(s) IV Push once  dextrose 50% Injectable 25 Gram(s) IV Push once  glucagon  Injectable 1 milliGRAM(s) IntraMuscular once  insulin lispro (ADMELOG) corrective regimen sliding scale   SubCutaneous three times a day before meals  insulin lispro (ADMELOG) corrective regimen sliding scale   SubCutaneous at bedtime  lidocaine   4% Patch 1 Patch Transdermal daily  pantoprazole    Tablet 40 milliGRAM(s) Oral before breakfast    MEDICATIONS  (PRN):  acetaminophen   Tablet .. 650 milliGRAM(s) Oral every 6 hours PRN Temp greater or equal to 38C (100.4F), Mild Pain (1 - 3)  cyclobenzaprine 5 milliGRAM(s) Oral two times a day PRN Muscle Spasm  melatonin 3 milliGRAM(s) Oral at bedtime PRN Insomnia  ondansetron Injectable 4 milliGRAM(s) IV Push every 8 hours PRN Nausea and/or Vomiting      CAPILLARY BLOOD GLUCOSE      POCT Blood Glucose.: 140 mg/dL (02 Oct 2021 12:19)  POCT Blood Glucose.: 143 mg/dL (02 Oct 2021 08:55)  POCT Blood Glucose.: 150 mg/dL (01 Oct 2021 22:19)  POCT Blood Glucose.: 105 mg/dL (01 Oct 2021 17:28)    I&O's Summary      PHYSICAL EXAM:  Vital Signs Last 24 Hrs  T(C): 36.8 (02 Oct 2021 05:50), Max: 38.7 (01 Oct 2021 22:56)  T(F): 98.3 (02 Oct 2021 05:50), Max: 101.6 (01 Oct 2021 22:56)  HR: 82 (02 Oct 2021 05:50) (80 - 100)  BP: 128/72 (02 Oct 2021 05:50) (115/60 - 151/71)  BP(mean): --  RR: 16 (02 Oct 2021 05:50) (16 - 17)  SpO2: 99% (02 Oct 2021 05:50) (97% - 100%)    GENERAL: No acute distress, well-developed  HEAD:  Atraumatic, Normocephalic  EYES: EOMI, PERRLA, conjunctiva and sclera clear  NECK: Supple, no lymphadenopathy, no JVD  CHEST/LUNG: CTAB; No wheezes, rales, or rhonchi  HEART: Regular rate and rhythm; Normal s1 and s2, No murmurs, rubs, or gallops  ABDOMEN: Soft, non-tender, non-distended; normal bowel sounds, no organomegaly  EXTREMITIES:  2+ peripheral pulses b/l, No clubbing, cyanosis, or edema  NEUROLOGY: A&O x 3, no focal deficits  SKIN: No rashes or lesions          LABS:                        7.8    9.67  )-----------( 196      ( 02 Oct 2021 08:21 )             23.3     10-02    139  |  107  |  11  ----------------------------<  162<H>  4.2   |  23  |  0.85    Ca    8.6      02 Oct 2021 08:21  Phos  4.2     10-02  Mg     1.70     10-02    TPro  5.6<L>  /  Alb  3.4  /  TBili  0.2  /  DBili  x   /  AST  15  /  ALT  9   /  AlkPhos  59  10-          Urinalysis Basic - ( 02 Oct 2021 03:00 )    Color: Light Yellow / Appearance: Clear / S.012 / pH: x  Gluc: x / Ketone: Small  / Bili: Negative / Urobili: <2 mg/dL   Blood: x / Protein: Trace / Nitrite: Negative   Leuk Esterase: Negative / RBC: 3 /HPF / WBC 1 /HPF   Sq Epi: x / Non Sq Epi: 1 /HPF / Bacteria: Negative          RADIOLOGY & ADDITIONAL TESTS:  Results Reviewed:   Imaging Personally Reviewed:  Electrocardiogram Personally Reviewed:    COORDINATION OF CARE:  Care Discussed with Consultants/Other Providers [Y/N]:  Prior or Outpatient Records Reviewed [Y/N]:   Mark Caballero, PGY-1  Internal Medicine  Pager: ML - [phone] and ONW - [phone]    PROGRESS NOTE:    ID #:     Patient is a 70y old  Female who presents with a chief complaint of Referred for GI bleed by gastroenterologist (02 Oct 2021 10:32)      MAJOR INTERVAL HOSPITAL EVENTS:     SUBJECTIVE / OVERNIGHT EVENTS: Overnight episode of BRBPR. No associated sob, cp, palpitations or dizziness.     REVIEW OF SYSTEMS:  CONSTITUTIONAL: No weakness, fevers or chills  EYES/ENT: No visual changes;  No vertigo or throat pain   NECK: No pain or stiffness  RESPIRATORY: No cough, wheezing, hemoptysis; No shortness of breath  CARDIOVASCULAR: No chest pain or palpitations  GASTROINTESTINAL: No abdominal or epigastric pain. No nausea, vomiting, or hematemesis; No diarrhea or constipation. +  hematochezia.  GENITOURINARY: No dysuria, frequency or hematuria  NEUROLOGICAL: No numbness or weakness  All other review of systems is negative unless indicated above.    MEDICATIONS  (STANDING):  dextrose 40% Gel 15 Gram(s) Oral once  dextrose 5%. 1000 milliLiter(s) (50 mL/Hr) IV Continuous <Continuous>  dextrose 5%. 1000 milliLiter(s) (100 mL/Hr) IV Continuous <Continuous>  dextrose 50% Injectable 25 Gram(s) IV Push once  dextrose 50% Injectable 12.5 Gram(s) IV Push once  dextrose 50% Injectable 25 Gram(s) IV Push once  glucagon  Injectable 1 milliGRAM(s) IntraMuscular once  insulin lispro (ADMELOG) corrective regimen sliding scale   SubCutaneous three times a day before meals  insulin lispro (ADMELOG) corrective regimen sliding scale   SubCutaneous at bedtime  lidocaine   4% Patch 1 Patch Transdermal daily  pantoprazole    Tablet 40 milliGRAM(s) Oral before breakfast    MEDICATIONS  (PRN):  acetaminophen   Tablet .. 650 milliGRAM(s) Oral every 6 hours PRN Temp greater or equal to 38C (100.4F), Mild Pain (1 - 3)  cyclobenzaprine 5 milliGRAM(s) Oral two times a day PRN Muscle Spasm  melatonin 3 milliGRAM(s) Oral at bedtime PRN Insomnia  ondansetron Injectable 4 milliGRAM(s) IV Push every 8 hours PRN Nausea and/or Vomiting      CAPILLARY BLOOD GLUCOSE      POCT Blood Glucose.: 140 mg/dL (02 Oct 2021 12:19)  POCT Blood Glucose.: 143 mg/dL (02 Oct 2021 08:55)  POCT Blood Glucose.: 150 mg/dL (01 Oct 2021 22:19)  POCT Blood Glucose.: 105 mg/dL (01 Oct 2021 17:28)    I&O's Summary      PHYSICAL EXAM:  Vital Signs Last 24 Hrs  T(C): 36.8 (02 Oct 2021 05:50), Max: 38.7 (01 Oct 2021 22:56)  T(F): 98.3 (02 Oct 2021 05:50), Max: 101.6 (01 Oct 2021 22:56)  HR: 82 (02 Oct 2021 05:50) (80 - 100)  BP: 128/72 (02 Oct 2021 05:50) (115/60 - 151/71)  BP(mean): --  RR: 16 (02 Oct 2021 05:50) (16 - 17)  SpO2: 99% (02 Oct 2021 05:50) (97% - 100%)    GENERAL: No acute distress, well-developed  HEAD:  Atraumatic, Normocephalic  EYES: EOMI, PERRLA, conjunctiva and sclera clear  NECK: Supple, no lymphadenopathy, no JVD  CHEST/LUNG: CTAB; No wheezes, rales, or rhonchi  HEART: Regular rate and rhythm; Normal s1 and s2, No murmurs, rubs, or gallops  ABDOMEN: Soft, non-tender, non-distended; normal bowel sounds, no organomegaly  EXTREMITIES:  2+ peripheral pulses b/l, No clubbing, cyanosis, or edema  NEUROLOGY: A&O x 3, no focal deficits            LABS:                        7.8    9.67  )-----------( 196      ( 02 Oct 2021 08:21 )             23.3     10-02    139  |  107  |  11  ----------------------------<  162<H>  4.2   |  23  |  0.85    Ca    8.6      02 Oct 2021 08:21  Phos  4.2     10-02  Mg     1.70     10-02    TPro  5.6<L>  /  Alb  3.4  /  TBili  0.2  /  DBili  x   /  AST  15  /  ALT  9   /  AlkPhos  59  10-01          Urinalysis Basic - ( 02 Oct 2021 03:00 )    Color: Light Yellow / Appearance: Clear / S.012 / pH: x  Gluc: x / Ketone: Small  / Bili: Negative / Urobili: <2 mg/dL   Blood: x / Protein: Trace / Nitrite: Negative   Leuk Esterase: Negative / RBC: 3 /HPF / WBC 1 /HPF   Sq Epi: x / Non Sq Epi: 1 /HPF / Bacteria: Negative          RADIOLOGY & ADDITIONAL TESTS:  Results Reviewed:   Imaging Personally Reviewed:  Electrocardiogram Personally Reviewed:    COORDINATION OF CARE:  Care Discussed with Consultants/Other Providers [Y/N]:  Prior or Outpatient Records Reviewed [Y/N]:

## 2021-10-03 DIAGNOSIS — R50.9 FEVER, UNSPECIFIED: ICD-10-CM

## 2021-10-03 LAB
BASOPHILS # BLD AUTO: 0.01 K/UL — SIGNIFICANT CHANGE UP (ref 0–0.2)
BASOPHILS NFR BLD AUTO: 0.2 % — SIGNIFICANT CHANGE UP (ref 0–2)
BLD GP AB SCN SERPL QL: NEGATIVE — SIGNIFICANT CHANGE UP
EOSINOPHIL # BLD AUTO: 0.07 K/UL — SIGNIFICANT CHANGE UP (ref 0–0.5)
EOSINOPHIL NFR BLD AUTO: 1.3 % — SIGNIFICANT CHANGE UP (ref 0–6)
GLUCOSE BLDC GLUCOMTR-MCNC: 145 MG/DL — HIGH (ref 70–99)
GLUCOSE BLDC GLUCOMTR-MCNC: 159 MG/DL — HIGH (ref 70–99)
GLUCOSE BLDC GLUCOMTR-MCNC: 203 MG/DL — HIGH (ref 70–99)
GLUCOSE BLDC GLUCOMTR-MCNC: 213 MG/DL — HIGH (ref 70–99)
HCT VFR BLD CALC: 23.7 % — LOW (ref 34.5–45)
HGB BLD-MCNC: 7.6 G/DL — LOW (ref 11.5–15.5)
IANC: 3.34 K/UL — SIGNIFICANT CHANGE UP (ref 1.5–8.5)
IMM GRANULOCYTES NFR BLD AUTO: 0.4 % — SIGNIFICANT CHANGE UP (ref 0–1.5)
LYMPHOCYTES # BLD AUTO: 1.49 K/UL — SIGNIFICANT CHANGE UP (ref 1–3.3)
LYMPHOCYTES # BLD AUTO: 27.3 % — SIGNIFICANT CHANGE UP (ref 13–44)
MCHC RBC-ENTMCNC: 28 PG — SIGNIFICANT CHANGE UP (ref 27–34)
MCHC RBC-ENTMCNC: 32.1 GM/DL — SIGNIFICANT CHANGE UP (ref 32–36)
MCV RBC AUTO: 87.5 FL — SIGNIFICANT CHANGE UP (ref 80–100)
MONOCYTES # BLD AUTO: 0.53 K/UL — SIGNIFICANT CHANGE UP (ref 0–0.9)
MONOCYTES NFR BLD AUTO: 9.7 % — SIGNIFICANT CHANGE UP (ref 2–14)
NEUTROPHILS # BLD AUTO: 3.34 K/UL — SIGNIFICANT CHANGE UP (ref 1.8–7.4)
NEUTROPHILS NFR BLD AUTO: 61.1 % — SIGNIFICANT CHANGE UP (ref 43–77)
NRBC # BLD: 0 /100 WBCS — SIGNIFICANT CHANGE UP
NRBC # FLD: 0 K/UL — SIGNIFICANT CHANGE UP
PLATELET # BLD AUTO: 201 K/UL — SIGNIFICANT CHANGE UP (ref 150–400)
RBC # BLD: 2.71 M/UL — LOW (ref 3.8–5.2)
RBC # FLD: 17.6 % — HIGH (ref 10.3–14.5)
RH IG SCN BLD-IMP: POSITIVE — SIGNIFICANT CHANGE UP
WBC # BLD: 5.46 K/UL — SIGNIFICANT CHANGE UP (ref 3.8–10.5)
WBC # FLD AUTO: 5.46 K/UL — SIGNIFICANT CHANGE UP (ref 3.8–10.5)

## 2021-10-03 PROCEDURE — 99233 SBSQ HOSP IP/OBS HIGH 50: CPT | Mod: GC

## 2021-10-03 RX ORDER — SIMETHICONE 80 MG/1
80 TABLET, CHEWABLE ORAL
Refills: 0 | Status: DISCONTINUED | OUTPATIENT
Start: 2021-10-03 | End: 2021-10-05

## 2021-10-03 RX ORDER — SENNA PLUS 8.6 MG/1
2 TABLET ORAL AT BEDTIME
Refills: 0 | Status: DISCONTINUED | OUTPATIENT
Start: 2021-10-03 | End: 2021-10-05

## 2021-10-03 RX ADMIN — PANTOPRAZOLE SODIUM 40 MILLIGRAM(S): 20 TABLET, DELAYED RELEASE ORAL at 06:04

## 2021-10-03 RX ADMIN — SIMETHICONE 80 MILLIGRAM(S): 80 TABLET, CHEWABLE ORAL at 11:11

## 2021-10-03 RX ADMIN — SENNA PLUS 2 TABLET(S): 8.6 TABLET ORAL at 21:59

## 2021-10-03 RX ADMIN — LIDOCAINE 1 PATCH: 4 CREAM TOPICAL at 23:38

## 2021-10-03 RX ADMIN — LIDOCAINE 1 PATCH: 4 CREAM TOPICAL at 11:11

## 2021-10-03 RX ADMIN — Medication 1: at 08:59

## 2021-10-03 RX ADMIN — SIMETHICONE 80 MILLIGRAM(S): 80 TABLET, CHEWABLE ORAL at 21:59

## 2021-10-03 RX ADMIN — LIDOCAINE 1 PATCH: 4 CREAM TOPICAL at 19:32

## 2021-10-03 RX ADMIN — Medication 2: at 12:58

## 2021-10-03 NOTE — PROGRESS NOTE ADULT - PROBLEM SELECTOR PLAN 1
Hx of prior GIB 2/2 PUD w/ H pylori, s/p full course of tx on 3/2021. Has been taking several Alleve per day for new back pain. Continues to have active bleeding.   - GI rec's appreciated   - Continue to monitor patient for bleeding  - EGD on 9/30 significant for Esophagitis, however no sign of active bleeding  - Colonoscopy 10/1 positive for diverticulosis w/o evidence of active bleeding, however this was noted to be the likely source of the patient's bleeding.   - Prior GI Hx: 5/17/21 - colonoscopy 2 polyps in ascending and transverse colon. 6/2021 - Capsule study negative for bleed_ Outpatient GI: Dr. oGrdillo - referred patient to hospital   - Maintaining active type and screen  - Will transfuse if Hgb < 7.0   - Patient strongly advised to avoid all NSAIDs from now on   - Given patient still has active GIB, will touch base with GI regarding possibility of capsule study.

## 2021-10-03 NOTE — PROGRESS NOTE ADULT - ASSESSMENT
69yo Female w/ mhx of uterine leiomyomas, iron deficiency anemia, HTN, recent hx of H. Pylori infection, lower back pain (taking Aleve) admitted for melena and severe anemia requiring transfusions. EGD negative for source of GIB and colonoscopy showed diverticulosis as likely culprit of acute bleeding. Patient continues to have BRBPR.

## 2021-10-03 NOTE — PROGRESS NOTE ADULT - SUBJECTIVE AND OBJECTIVE BOX
Eliu Chapin MD  Internal Medicine, PGY-1  Pager: [phone] / [facility]: [phone]    SUBJECTIVE / OVERNIGHT EVENTS:  - Pt seen and examined at bedside  - JACION  - the patient did not have any BMs overnight. She reports that her last BM was the bloody BM yesterday morning. Since then she has been feeling alright, no evidence of nausea, vomiting, dizziness, shortness of breath or syncopal episodes.     MEDICATIONS  (STANDING):  dextrose 40% Gel 15 Gram(s) Oral once  dextrose 5%. 1000 milliLiter(s) (50 mL/Hr) IV Continuous <Continuous>  dextrose 5%. 1000 milliLiter(s) (100 mL/Hr) IV Continuous <Continuous>  dextrose 50% Injectable 25 Gram(s) IV Push once  dextrose 50% Injectable 12.5 Gram(s) IV Push once  dextrose 50% Injectable 25 Gram(s) IV Push once  glucagon  Injectable 1 milliGRAM(s) IntraMuscular once  insulin lispro (ADMELOG) corrective regimen sliding scale   SubCutaneous three times a day before meals  insulin lispro (ADMELOG) corrective regimen sliding scale   SubCutaneous at bedtime  lidocaine   4% Patch 1 Patch Transdermal daily  pantoprazole    Tablet 40 milliGRAM(s) Oral before breakfast    MEDICATIONS  (PRN):  acetaminophen   Tablet .. 650 milliGRAM(s) Oral every 6 hours PRN Temp greater or equal to 38C (100.4F), Mild Pain (1 - 3)  cyclobenzaprine 5 milliGRAM(s) Oral two times a day PRN Muscle Spasm  melatonin 3 milliGRAM(s) Oral at bedtime PRN Insomnia  ondansetron Injectable 4 milliGRAM(s) IV Push every 8 hours PRN Nausea and/or Vomiting  simethicone 80 milliGRAM(s) Chew two times a day PRN Dyspepsia      PHYSICAL EXAM:  Vital Signs Last 24 Hrs  T(C): 36.8 (03 Oct 2021 05:58), Max: 36.8 (03 Oct 2021 05:58)  T(F): 98.3 (03 Oct 2021 05:58), Max: 98.3 (03 Oct 2021 05:58)  HR: 81 (03 Oct 2021 05:58) (72 - 81)  BP: 129/66 (03 Oct 2021 05:58) (123/55 - 129/66)  BP(mean): --  RR: 16 (03 Oct 2021 05:58) (16 - 17)  SpO2: 100% (03 Oct 2021 05:58) (97% - 100%)    CAPILLARY BLOOD GLUCOSE      POCT Blood Glucose.: 159 mg/dL (03 Oct 2021 08:35)  POCT Blood Glucose.: 198 mg/dL (02 Oct 2021 21:26)  POCT Blood Glucose.: 161 mg/dL (02 Oct 2021 17:39)  POCT Blood Glucose.: 140 mg/dL (02 Oct 2021 12:19)    I&O's Summary      CONSTITUTIONAL: NAD, well-developed  RESPIRATORY: Normal respiratory effort; lungs are clear to auscultation bilaterally  CARDIOVASCULAR: Regular rate and rhythm, normal S1 and S2, no murmur/rub/gallop; No lower extremity edema; Peripheral pulses are 2+ bilaterally  ABDOMEN: Nontender to palpation, normoactive bowel sounds, no rebound/guarding; No hepatosplenomegaly  MUSCLOSKELETAL: no clubbing or cyanosis of digits; no joint swelling or tenderness to palpation  PSYCH: A+O to person, place, and time; affect appropriate    LABS:                        7.6    5.46  )-----------( 201      ( 03 Oct 2021 07:47 )             23.7     10-02    139  |  107  |  11  ----------------------------<  162<H>  4.2   |  23  |  0.85    Ca    8.6      02 Oct 2021 08:21  Phos  4.2     10-02  Mg     1.70     10-02            Urinalysis Basic - ( 02 Oct 2021 03:00 )    Color: Light Yellow / Appearance: Clear / S.012 / pH: x  Gluc: x / Ketone: Small  / Bili: Negative / Urobili: <2 mg/dL   Blood: x / Protein: Trace / Nitrite: Negative   Leuk Esterase: Negative / RBC: 3 /HPF / WBC 1 /HPF   Sq Epi: x / Non Sq Epi: 1 /HPF / Bacteria: Negative        Culture - Blood (collected 02 Oct 2021 02:04)  Source: .Blood Blood-Venous  Preliminary Report (03 Oct 2021 03:01):    No growth to date.    Culture - Blood (collected 02 Oct 2021 02:04)  Source: .Blood Blood-Venous  Preliminary Report (03 Oct 2021 03:01):    No growth to date.

## 2021-10-03 NOTE — PROGRESS NOTE ADULT - PROBLEM SELECTOR PLAN 9
DVTppx, SCDs, no Heparin sq due to active GIB   Diet: Consistent Carbs   Dispo: in patient management

## 2021-10-03 NOTE — PROGRESS NOTE ADULT - PROBLEM SELECTOR PLAN 3
Was reported to be febrile in 10/1 PM. In the setting of possible underlying GI infection vs inflammatory in the setting of current bleed.   - Tylenol PRN  - F/u BCx  - If patient continues w/ bloody diarrhea, will obtain GI PCR and Stool Cx

## 2021-10-04 LAB
ANION GAP SERPL CALC-SCNC: 9 MMOL/L — SIGNIFICANT CHANGE UP (ref 7–14)
BUN SERPL-MCNC: 9 MG/DL — SIGNIFICANT CHANGE UP (ref 7–23)
CALCIUM SERPL-MCNC: 8.7 MG/DL — SIGNIFICANT CHANGE UP (ref 8.4–10.5)
CHLORIDE SERPL-SCNC: 108 MMOL/L — HIGH (ref 98–107)
CO2 SERPL-SCNC: 24 MMOL/L — SIGNIFICANT CHANGE UP (ref 22–31)
CREAT SERPL-MCNC: 0.76 MG/DL — SIGNIFICANT CHANGE UP (ref 0.5–1.3)
GLUCOSE BLDC GLUCOMTR-MCNC: 103 MG/DL — HIGH (ref 70–99)
GLUCOSE BLDC GLUCOMTR-MCNC: 161 MG/DL — HIGH (ref 70–99)
GLUCOSE BLDC GLUCOMTR-MCNC: 164 MG/DL — HIGH (ref 70–99)
GLUCOSE BLDC GLUCOMTR-MCNC: 208 MG/DL — HIGH (ref 70–99)
GLUCOSE SERPL-MCNC: 149 MG/DL — HIGH (ref 70–99)
HCT VFR BLD CALC: 24.3 % — LOW (ref 34.5–45)
HGB BLD-MCNC: 7.8 G/DL — LOW (ref 11.5–15.5)
MAGNESIUM SERPL-MCNC: 2 MG/DL — SIGNIFICANT CHANGE UP (ref 1.6–2.6)
MCHC RBC-ENTMCNC: 27.6 PG — SIGNIFICANT CHANGE UP (ref 27–34)
MCHC RBC-ENTMCNC: 32.1 GM/DL — SIGNIFICANT CHANGE UP (ref 32–36)
MCV RBC AUTO: 85.9 FL — SIGNIFICANT CHANGE UP (ref 80–100)
NRBC # BLD: 0 /100 WBCS — SIGNIFICANT CHANGE UP
NRBC # FLD: 0 K/UL — SIGNIFICANT CHANGE UP
PHOSPHATE SERPL-MCNC: 3.1 MG/DL — SIGNIFICANT CHANGE UP (ref 2.5–4.5)
PLATELET # BLD AUTO: 219 K/UL — SIGNIFICANT CHANGE UP (ref 150–400)
POTASSIUM SERPL-MCNC: 4.3 MMOL/L — SIGNIFICANT CHANGE UP (ref 3.5–5.3)
POTASSIUM SERPL-SCNC: 4.3 MMOL/L — SIGNIFICANT CHANGE UP (ref 3.5–5.3)
RBC # BLD: 2.83 M/UL — LOW (ref 3.8–5.2)
RBC # FLD: 17.3 % — HIGH (ref 10.3–14.5)
SODIUM SERPL-SCNC: 141 MMOL/L — SIGNIFICANT CHANGE UP (ref 135–145)
WBC # BLD: 5.83 K/UL — SIGNIFICANT CHANGE UP (ref 3.8–10.5)
WBC # FLD AUTO: 5.83 K/UL — SIGNIFICANT CHANGE UP (ref 3.8–10.5)

## 2021-10-04 PROCEDURE — 99233 SBSQ HOSP IP/OBS HIGH 50: CPT | Mod: GC

## 2021-10-04 PROCEDURE — 99232 SBSQ HOSP IP/OBS MODERATE 35: CPT | Mod: GC

## 2021-10-04 RX ORDER — POLYETHYLENE GLYCOL 3350 17 G/17G
17 POWDER, FOR SOLUTION ORAL ONCE
Refills: 0 | Status: COMPLETED | OUTPATIENT
Start: 2021-10-04 | End: 2021-10-04

## 2021-10-04 RX ADMIN — LIDOCAINE 1 PATCH: 4 CREAM TOPICAL at 18:34

## 2021-10-04 RX ADMIN — POLYETHYLENE GLYCOL 3350 17 GRAM(S): 17 POWDER, FOR SOLUTION ORAL at 12:38

## 2021-10-04 RX ADMIN — Medication 1: at 08:42

## 2021-10-04 RX ADMIN — SENNA PLUS 2 TABLET(S): 8.6 TABLET ORAL at 21:36

## 2021-10-04 RX ADMIN — LIDOCAINE 1 PATCH: 4 CREAM TOPICAL at 12:38

## 2021-10-04 RX ADMIN — Medication 2: at 12:38

## 2021-10-04 RX ADMIN — PANTOPRAZOLE SODIUM 40 MILLIGRAM(S): 20 TABLET, DELAYED RELEASE ORAL at 06:38

## 2021-10-04 NOTE — CHART NOTE - NSCHARTNOTEFT_GEN_A_CORE
Pt has not had BM since this AM. Feeling well.    Recs:  - Keep on clears.  - Monitor BMs.  - Trend Hb.  - Based on bowel movements and Hb trend in AM, will decide on whether inpatient endoscopic work-up is needed or if patient can be discharged home.    Micheal Mckee  Gastroenterology/Hepatology Fellow  Available via [employer] Teams    NON-URGENT CONSULTS:  Please email [email or web address]  AT NIGHT AND ON WEEKENDS:  Contact on-call GI fellow via answering service ([phone]) from 5pm-8am and on weekends/holidays  MONDAY-FRIDAY 8AM-5PM:  Pager# [phone] ([facility]) or [phone] ([facility])  GI Phone# [phone] ([facility])

## 2021-10-04 NOTE — PROGRESS NOTE ADULT - PROBLEM SELECTOR PLAN 1
Hx of prior GIB 2/2 PUD w/ H pylori, s/p full course of tx on 3/2021. Has been taking several Alleve per day for new back pain. Hgb stable   - GI rec's appreciated   - Continue to monitor patient for bleeding  - EGD on 9/30 significant for Esophagitis, however no sign of active bleeding  - Colonoscopy 10/1 positive for diverticulosis w/o evidence of active bleeding, however this was noted to be the likely source of the patient's bleeding.   - Prior GI Hx: 5/17/21 - colonoscopy 2 polyps in ascending and transverse colon. 6/2021 - Capsule study negative for bleed_ Outpatient GI: Dr. Gordillo - referred patient to hospital   - Maintaining active type and screen  - Will transfuse if Hgb < 7.0   - Patient strongly advised to avoid all NSAIDs from now on   - Will f/u patient's next BM. If non-bloody, can likely follow up as outpatient given stable Hgb

## 2021-10-04 NOTE — PROGRESS NOTE ADULT - PROBLEM SELECTOR PLAN 3
RESOLVED    Was reported to be febrile in 10/1 PM. In the setting of possible underlying GI infection vs inflammatory in the setting of current bleed.   - Tylenol PRN  - F/u BCx

## 2021-10-04 NOTE — PROGRESS NOTE ADULT - ASSESSMENT
69yo Female w/ mhx of uterine leiomyomas, iron deficiency anemia, HTN, recent hx of H. Pylori infection, lower back pain (taking Aleve) admitted for melena and severe anemia requiring transfusions. EGD negative for source of GIB and colonoscopy showed diverticulosis as likely culprit of acute bleeding. Patient Hgb stable, pending next BM, if it is non-bloody, patient can follow up as outpatient w/ GI. Unlikely that patient has an active bleed at this time.

## 2021-10-04 NOTE — PROGRESS NOTE ADULT - SUBJECTIVE AND OBJECTIVE BOX
Interval Events:   Patient denies any abdominal pain, nausea /vomiting, diarrhea or melena / bloody bm.    Hospital Medications:  acetaminophen   Tablet .. 650 milliGRAM(s) Oral every 6 hours PRN  cyclobenzaprine 5 milliGRAM(s) Oral two times a day PRN  dextrose 40% Gel 15 Gram(s) Oral once  dextrose 5%. 1000 milliLiter(s) IV Continuous <Continuous>  dextrose 5%. 1000 milliLiter(s) IV Continuous <Continuous>  dextrose 50% Injectable 25 Gram(s) IV Push once  dextrose 50% Injectable 12.5 Gram(s) IV Push once  dextrose 50% Injectable 25 Gram(s) IV Push once  glucagon  Injectable 1 milliGRAM(s) IntraMuscular once  insulin lispro (ADMELOG) corrective regimen sliding scale   SubCutaneous three times a day before meals  insulin lispro (ADMELOG) corrective regimen sliding scale   SubCutaneous at bedtime  lidocaine   4% Patch 1 Patch Transdermal daily  melatonin 3 milliGRAM(s) Oral at bedtime PRN  ondansetron Injectable 4 milliGRAM(s) IV Push every 8 hours PRN  pantoprazole    Tablet 40 milliGRAM(s) Oral before breakfast  polyethylene glycol 3350 17 Gram(s) Oral once  senna 2 Tablet(s) Oral at bedtime  simethicone 80 milliGRAM(s) Chew two times a day PRN        PHYSICAL EXAM:   Vital Signs:  Vital Signs Last 24 Hrs  T(C): 36.4 (04 Oct 2021 06:35), Max: 36.7 (03 Oct 2021 12:25)  T(F): 97.6 (04 Oct 2021 06:35), Max: 98.1 (03 Oct 2021 12:25)  HR: 71 (04 Oct 2021 06:35) (71 - 79)  BP: 119/60 (04 Oct 2021 06:35) (119/60 - 141/70)  BP(mean): --  RR: 17 (04 Oct 2021 06:35) (17 - 19)  SpO2: 99% (04 Oct 2021 06:35) (99% - 100%)  Daily     Daily     GENERAL:  NAD, Appears stated age  HEENT:  NC/AT,  conjunctivae clear and pink, sclera -anicteric  CHEST:  Normal Effort, Breath sounds clear  HEART:  RRR, S1 + S2, no murmurs  ABDOMEN:  Soft, non-tender, non-distended, normoactive bowel sounds,  no masses  EXTREMITIES:  no cyanosis or edema  SKIN:  Warm & Dry. No rash or erythema  NEURO:  Alert, oriented, no focal deficit    LABS:                        7.8    5.83  )-----------( 219      ( 04 Oct 2021 07:07 )             24.3     Mean Cell Volume: 85.9 fL (10-04-21 @ 07:07)    10-04    141  |  108<H>  |  9   ----------------------------<  149<H>  4.3   |  24  |  0.76    Ca    8.7      04 Oct 2021 07:07  Phos  3.1     10-04  Mg     2.00     10-04                                    7.8    5.83  )-----------( 219      ( 04 Oct 2021 07:07 )             24.3                         7.6    5.46  )-----------( 201      ( 03 Oct 2021 07:47 )             23.7                         7.1    6.33  )-----------( 182      ( 02 Oct 2021 22:51 )             22.4                         7.8    9.67  )-----------( 196      ( 02 Oct 2021 08:21 )             23.3                         8.1    9.37  )-----------( 189      ( 01 Oct 2021 23:53 )             25.3       Imaging: Images reviewed no new images appreciated.

## 2021-10-04 NOTE — PROGRESS NOTE ADULT - ASSESSMENT
69yo Female w/ mhx of uterine leiomyomas, iron deficiency anemia, HTN presents with melena in setting of NSAIDs use.    #Melena  EGD with esophagitis and Colon with diverticulosis, suspecting most likely source of bleeding.     Recommendations:  -Continue Daily PPI for Esophagitis on EGD  -Hgb stable in the last 72 hrs, can watch for another day but DC tomorrow if it remains stable.   -Anticipate patient may still have dark stool due to old blood. Okay as long as Hgb is stable.   -Follow up with her outside GI doctor.    No further GI work up from our perspective, please reach out to us for any question or concern.     Phillip Lane MD  Gastroenterology/Hepatology Fellow  1st option: [phone] (text or call), ONLY available from 7:00 am to 5:00 pm.   **Contact on-call GI fellow via answering service ([phone]) from 5pm-7am AND on weekends/holidays**  2nd option: Available via Microsoft Teams  3rd option: Pager: [phone]    NON-URGENT CONSULTS:  Please email [email or web address] OR  [email or web address]  AT NIGHT AND ON WEEKENDS:  Contact on-call GI fellow via answering service ([phone]) from 5pm-8am AND on weekends/holidays   69yo Female w/ mhx of uterine leiomyomas, iron deficiency anemia, HTN presents with melena in setting of NSAIDs use.    #Melena  EGD with esophagitis and Colon with diverticulosis, suspecting most likely source of bleeding.     Recommendations:  -Clear liquid diet for now  -Continue Daily PPI for Esophagitis on EGD  -Anticipate patient may still have dark stool due to old blood vs recurrent/new bleeding  -Follow up with her outside GI doctor pending clinical course    GI will continue to follow, please reach out to us for any question or concern.     Phillip Lane MD  Gastroenterology/Hepatology Fellow  1st option: [phone] (text or call), ONLY available from 7:00 am to 5:00 pm.   **Contact on-call GI fellow via answering service ([phone]) from 5pm-7am AND on weekends/holidays**  2nd option: Available via Microsoft Teams  3rd option: Pager: [phone]    NON-URGENT CONSULTS:  Please email [email or web address] OR  [email or web address]  AT NIGHT AND ON WEEKENDS:  Contact on-call GI fellow via answering service ([phone]) from 5pm-8am AND on weekends/holidays

## 2021-10-04 NOTE — PROGRESS NOTE ADULT - PROBLEM SELECTOR PLAN 9
DVTppx, SCDs, no Heparin sq due to active GIB   Diet: Consistent Carbs   Dispo: in patient management, will d/c home if normal BM

## 2021-10-04 NOTE — PROGRESS NOTE ADULT - SUBJECTIVE AND OBJECTIVE BOX
Eliu Chapin MD  Internal Medicine, PGY-1  Pager: [phone] / [facility]: [phone]    SUBJECTIVE / OVERNIGHT EVENTS:  - Pt seen and examined at bedside  - NAEON  - No BMs overnight. The patient reports feeling tired from sitting and laying in bed all the time.     MEDICATIONS  (STANDING):  dextrose 40% Gel 15 Gram(s) Oral once  dextrose 5%. 1000 milliLiter(s) (50 mL/Hr) IV Continuous <Continuous>  dextrose 5%. 1000 milliLiter(s) (100 mL/Hr) IV Continuous <Continuous>  dextrose 50% Injectable 25 Gram(s) IV Push once  dextrose 50% Injectable 12.5 Gram(s) IV Push once  dextrose 50% Injectable 25 Gram(s) IV Push once  glucagon  Injectable 1 milliGRAM(s) IntraMuscular once  insulin lispro (ADMELOG) corrective regimen sliding scale   SubCutaneous three times a day before meals  insulin lispro (ADMELOG) corrective regimen sliding scale   SubCutaneous at bedtime  lidocaine   4% Patch 1 Patch Transdermal daily  pantoprazole    Tablet 40 milliGRAM(s) Oral before breakfast  polyethylene glycol 3350 17 Gram(s) Oral once  senna 2 Tablet(s) Oral at bedtime    MEDICATIONS  (PRN):  acetaminophen   Tablet .. 650 milliGRAM(s) Oral every 6 hours PRN Temp greater or equal to 38C (100.4F), Mild Pain (1 - 3)  cyclobenzaprine 5 milliGRAM(s) Oral two times a day PRN Muscle Spasm  melatonin 3 milliGRAM(s) Oral at bedtime PRN Insomnia  ondansetron Injectable 4 milliGRAM(s) IV Push every 8 hours PRN Nausea and/or Vomiting  simethicone 80 milliGRAM(s) Chew two times a day PRN Dyspepsia      PHYSICAL EXAM:  Vital Signs Last 24 Hrs  T(C): 36.4 (04 Oct 2021 06:35), Max: 36.7 (03 Oct 2021 12:25)  T(F): 97.6 (04 Oct 2021 06:35), Max: 98.1 (03 Oct 2021 12:25)  HR: 71 (04 Oct 2021 06:35) (71 - 79)  BP: 119/60 (04 Oct 2021 06:35) (119/60 - 141/70)  BP(mean): --  RR: 17 (04 Oct 2021 06:35) (17 - 19)  SpO2: 99% (04 Oct 2021 06:35) (99% - 100%)    CAPILLARY BLOOD GLUCOSE      POCT Blood Glucose.: 164 mg/dL (04 Oct 2021 08:26)  POCT Blood Glucose.: 203 mg/dL (03 Oct 2021 21:27)  POCT Blood Glucose.: 145 mg/dL (03 Oct 2021 17:39)  POCT Blood Glucose.: 213 mg/dL (03 Oct 2021 12:38)    I&O's Summary      CONSTITUTIONAL: NAD, well-developed  RESPIRATORY: Normal respiratory effort; lungs are clear to auscultation bilaterally  CARDIOVASCULAR: Regular rate and rhythm, normal S1 and S2, no murmur/rub/gallop; No lower extremity edema; Peripheral pulses are 2+ bilaterally  ABDOMEN: Nontender to palpation, normoactive bowel sounds, no rebound/guarding; No hepatosplenomegaly  MUSCLOSKELETAL: no clubbing or cyanosis of digits; no joint swelling or tenderness to palpation  PSYCH: A+O to person, place, and time; affect appropriate    LABS:                        7.8    5.83  )-----------( 219      ( 04 Oct 2021 07:07 )             24.3     10-04    141  |  108<H>  |  9   ----------------------------<  149<H>  4.3   |  24  |  0.76    Ca    8.7      04 Oct 2021 07:07  Phos  3.1     10-04  Mg     2.00     10-04                Culture - Blood (collected 02 Oct 2021 02:04)  Source: .Blood Blood-Venous  Preliminary Report (03 Oct 2021 03:01):    No growth to date.    Culture - Blood (collected 02 Oct 2021 02:04)  Source: .Blood Blood-Venous  Preliminary Report (03 Oct 2021 03:01):    No growth to date.        IMAGING:

## 2021-10-05 ENCOUNTER — TRANSCRIPTION ENCOUNTER (OUTPATIENT)
Age: 71
End: 2021-10-05

## 2021-10-05 VITALS
HEART RATE: 94 BPM | SYSTOLIC BLOOD PRESSURE: 138 MMHG | TEMPERATURE: 99 F | RESPIRATION RATE: 18 BRPM | DIASTOLIC BLOOD PRESSURE: 60 MMHG | OXYGEN SATURATION: 100 %

## 2021-10-05 LAB
-  AMIKACIN: SIGNIFICANT CHANGE UP
-  AMOXICILLIN/CLAVULANIC ACID: SIGNIFICANT CHANGE UP
-  AMPICILLIN/SULBACTAM: SIGNIFICANT CHANGE UP
-  AMPICILLIN: SIGNIFICANT CHANGE UP
-  AZTREONAM: SIGNIFICANT CHANGE UP
-  CEFAZOLIN: SIGNIFICANT CHANGE UP
-  CEFEPIME: SIGNIFICANT CHANGE UP
-  CEFOXITIN: SIGNIFICANT CHANGE UP
-  CEFTRIAXONE: SIGNIFICANT CHANGE UP
-  CIPROFLOXACIN: SIGNIFICANT CHANGE UP
-  ERTAPENEM: SIGNIFICANT CHANGE UP
-  GENTAMICIN: SIGNIFICANT CHANGE UP
-  IMIPENEM: SIGNIFICANT CHANGE UP
-  LEVOFLOXACIN: SIGNIFICANT CHANGE UP
-  MEROPENEM: SIGNIFICANT CHANGE UP
-  NITROFURANTOIN: SIGNIFICANT CHANGE UP
-  PIPERACILLIN/TAZOBACTAM: SIGNIFICANT CHANGE UP
-  TIGECYCLINE: SIGNIFICANT CHANGE UP
-  TOBRAMYCIN: SIGNIFICANT CHANGE UP
-  TRIMETHOPRIM/SULFAMETHOXAZOLE: SIGNIFICANT CHANGE UP
ANION GAP SERPL CALC-SCNC: 12 MMOL/L — SIGNIFICANT CHANGE UP (ref 7–14)
BUN SERPL-MCNC: 7 MG/DL — SIGNIFICANT CHANGE UP (ref 7–23)
CALCIUM SERPL-MCNC: 9.1 MG/DL — SIGNIFICANT CHANGE UP (ref 8.4–10.5)
CHLORIDE SERPL-SCNC: 105 MMOL/L — SIGNIFICANT CHANGE UP (ref 98–107)
CO2 SERPL-SCNC: 23 MMOL/L — SIGNIFICANT CHANGE UP (ref 22–31)
CREAT SERPL-MCNC: 0.77 MG/DL — SIGNIFICANT CHANGE UP (ref 0.5–1.3)
CULTURE RESULTS: SIGNIFICANT CHANGE UP
GLUCOSE BLDC GLUCOMTR-MCNC: 146 MG/DL — HIGH (ref 70–99)
GLUCOSE BLDC GLUCOMTR-MCNC: 239 MG/DL — HIGH (ref 70–99)
GLUCOSE SERPL-MCNC: 144 MG/DL — HIGH (ref 70–99)
H PYLORI AG STL QL: SIGNIFICANT CHANGE UP
HCT VFR BLD CALC: 24.4 % — LOW (ref 34.5–45)
HGB BLD-MCNC: 7.7 G/DL — LOW (ref 11.5–15.5)
INR BLD: 1.01 RATIO — SIGNIFICANT CHANGE UP (ref 0.88–1.16)
MAGNESIUM SERPL-MCNC: 1.9 MG/DL — SIGNIFICANT CHANGE UP (ref 1.6–2.6)
MCHC RBC-ENTMCNC: 27.3 PG — SIGNIFICANT CHANGE UP (ref 27–34)
MCHC RBC-ENTMCNC: 31.6 GM/DL — LOW (ref 32–36)
MCV RBC AUTO: 86.5 FL — SIGNIFICANT CHANGE UP (ref 80–100)
METHOD TYPE: SIGNIFICANT CHANGE UP
NRBC # BLD: 0 /100 WBCS — SIGNIFICANT CHANGE UP
NRBC # FLD: 0 K/UL — SIGNIFICANT CHANGE UP
ORGANISM # SPEC MICROSCOPIC CNT: SIGNIFICANT CHANGE UP
ORGANISM # SPEC MICROSCOPIC CNT: SIGNIFICANT CHANGE UP
PHOSPHATE SERPL-MCNC: 3.5 MG/DL — SIGNIFICANT CHANGE UP (ref 2.5–4.5)
PLATELET # BLD AUTO: 268 K/UL — SIGNIFICANT CHANGE UP (ref 150–400)
POTASSIUM SERPL-MCNC: 4.2 MMOL/L — SIGNIFICANT CHANGE UP (ref 3.5–5.3)
POTASSIUM SERPL-SCNC: 4.2 MMOL/L — SIGNIFICANT CHANGE UP (ref 3.5–5.3)
PROTHROM AB SERPL-ACNC: 11.6 SEC — SIGNIFICANT CHANGE UP (ref 10.6–13.6)
RBC # BLD: 2.82 M/UL — LOW (ref 3.8–5.2)
RBC # FLD: 16.8 % — HIGH (ref 10.3–14.5)
SODIUM SERPL-SCNC: 140 MMOL/L — SIGNIFICANT CHANGE UP (ref 135–145)
SPECIMEN SOURCE: SIGNIFICANT CHANGE UP
WBC # BLD: 5.54 K/UL — SIGNIFICANT CHANGE UP (ref 3.8–10.5)
WBC # FLD AUTO: 5.54 K/UL — SIGNIFICANT CHANGE UP (ref 3.8–10.5)

## 2021-10-05 PROCEDURE — 99231 SBSQ HOSP IP/OBS SF/LOW 25: CPT | Mod: GC

## 2021-10-05 PROCEDURE — 99239 HOSP IP/OBS DSCHRG MGMT >30: CPT

## 2021-10-05 RX ORDER — CYCLOBENZAPRINE HYDROCHLORIDE 10 MG/1
1 TABLET, FILM COATED ORAL
Qty: 28 | Refills: 0
Start: 2021-10-05 | End: 2021-10-18

## 2021-10-05 RX ORDER — CYCLOBENZAPRINE HYDROCHLORIDE 10 MG/1
1 TABLET, FILM COATED ORAL
Qty: 60 | Refills: 0
Start: 2021-10-05 | End: 2021-11-03

## 2021-10-05 RX ADMIN — Medication 2: at 12:24

## 2021-10-05 RX ADMIN — LIDOCAINE 1 PATCH: 4 CREAM TOPICAL at 12:23

## 2021-10-05 RX ADMIN — PANTOPRAZOLE SODIUM 40 MILLIGRAM(S): 20 TABLET, DELAYED RELEASE ORAL at 06:19

## 2021-10-05 RX ADMIN — LIDOCAINE 1 PATCH: 4 CREAM TOPICAL at 00:30

## 2021-10-05 NOTE — PROGRESS NOTE ADULT - REASON FOR ADMISSION
Referred for GI bleed by gastroenterologist

## 2021-10-05 NOTE — PROGRESS NOTE ADULT - PROBLEM SELECTOR PLAN 5
Patient w/ complaints of lumbosacral pain. CT abd was negative for any osseous abnormality in lumbosacral region. Likely soft tissue pain. Improving this admission.   - PRN Tylenol   - PRN Flexeril  - Patient able to ambulate on her own
Patient w/ complaints of lumbosacral pain. CT abd was negative for any osseous abnormality in lumbosacral region. Likely soft tissue pain. Improving this admission.   - PRN Tylenol   - PRN Flexeril  - Patient able to ambulate on her own
Patient w/ complaints of lumbosacral pain. CT abd was negative for any osseous abnormality in lumbosacral region. Likely soft tissue pain. Improving this admission.   - PRN Tylenol   - PRN Flexeril  - PT eval once patient more HDS
PH 7.29 on VBG 9/30. Likely in the setting of diarrhea 2/2 acute blood loss.   - Transfusing blood since diarrhea in the setting of blood loss  - IVF while NPO
PH 7.29 on VBG 9/30. Likely in the setting of diarrhea 2/2 acute blood loss.   - Transfusing blood since diarrhea in the setting of blood loss
PH 7.29 on VBG. Likely in the setting of diarrhea 2/2 acute blood loss.   - Transfusing blood since diarrhea in the setting of blood loss  - Once Hgb > 7, will start patient on fluids for re-hydration

## 2021-10-05 NOTE — PROGRESS NOTE ADULT - ASSESSMENT
71yo Female w/ mhx of uterine leiomyomas, iron deficiency anemia, HTN presents with melena in setting of NSAIDs use. Found to have esophagitis on EGD and diverticulosis on colonoscopy. Course c/b visualized blood in stool after procedure, but likely old blood as VS remain stable and Hb has been stable for several days.    Impression:  #Esophagitis  #Diverticulosis   #Iron deficiency anemia    Recommendations:  - Advance diet.  - Can be discharged from GI perspective.  - PPI daily for esophagitis x 2 months.  - Outpatient follow up with her private GI doctor. She can call [phone] to schedule follow up with faculty practice if she prefers.    Micheal Mckee  Gastroenterology/Hepatology Fellow  Available via Microsoft Teams    NON-URGENT CONSULTS:  Please email [email or web address]  [email or web address]  AT NIGHT AND ON WEEKENDS:  Contact on-call GI fellow via answering service ([phone]) from 5pm-8am and on weekends/holidays  MONDAY-FRIDAY 8AM-5PM:  Pager# [phone] ([facility]) or [phone] ([facility])  GI Phone# [phone] ([facility])

## 2021-10-05 NOTE — DISCHARGE NOTE NURSING/CASE MANAGEMENT/SOCIAL WORK - PATIENT PORTAL LINK FT
You can access the FollowMyHealth Patient Portal offered by [facility] by registering at the following website: [email or web address]. By joining [facility]’s FollowMyHealth portal, you will also be able to view your health information using other applications (apps) compatible with our system.

## 2021-10-05 NOTE — PROGRESS NOTE ADULT - PROBLEM SELECTOR PROBLEM 7
Fibroids
Type 2 diabetes mellitus with hyperglycemia, without long-term current use of insulin
Type 2 diabetes mellitus with hyperglycemia, without long-term current use of insulin
Fibroids
Fibroids
Type 2 diabetes mellitus with hyperglycemia, without long-term current use of insulin

## 2021-10-05 NOTE — PROGRESS NOTE ADULT - PROBLEM SELECTOR PROBLEM 2
Anemia due to acute blood loss

## 2021-10-05 NOTE — PROGRESS NOTE ADULT - PROBLEM SELECTOR PLAN 2
Hemoglobin 6.7 on admission , likely secondary to hemorrhage and iron deficiency   - denies any episodes of hematuria, hematochezia or hematemesis   - received 1 unit in the ED, post-transfusion CBC 6.8 Currently transfusing another unit. Will get post-transfusion CBC  - maintain active type and screen  - transfusion goals > 7
Hemoglobin 6.7 on admission , likely secondary to hemorrhage and iron deficiency   - uptrending. see above
Hemoglobin 6.7 on admission , likely secondary to hemorrhage and iron deficiency   - denies any episodes of hematuria, hematochezia or hematemesis   - received 1 unit in the ED, post-transfusion CBC 6.8 Currently transfusing another unit. Will get post-transfusion CBC  - maintain active type and screen  - transfusion goals > 7

## 2021-10-05 NOTE — PROGRESS NOTE ADULT - SUBJECTIVE AND OBJECTIVE BOX
Chief Complaint:  Patient is a 70y old  Female who presents with a chief complaint of Referred for GI bleed by gastroenterologist (05 Oct 2021 09:23)    Reason for consult: GI bleed    Interval Events: No BMs in 24 hours, Hb stable, pt feels well.     Hospital Medications:  acetaminophen   Tablet .. 650 milliGRAM(s) Oral every 6 hours PRN  cyclobenzaprine 5 milliGRAM(s) Oral two times a day PRN  dextrose 40% Gel 15 Gram(s) Oral once  dextrose 5%. 1000 milliLiter(s) IV Continuous <Continuous>  dextrose 5%. 1000 milliLiter(s) IV Continuous <Continuous>  dextrose 50% Injectable 25 Gram(s) IV Push once  dextrose 50% Injectable 12.5 Gram(s) IV Push once  dextrose 50% Injectable 25 Gram(s) IV Push once  glucagon  Injectable 1 milliGRAM(s) IntraMuscular once  insulin lispro (ADMELOG) corrective regimen sliding scale   SubCutaneous three times a day before meals  insulin lispro (ADMELOG) corrective regimen sliding scale   SubCutaneous at bedtime  lidocaine   4% Patch 1 Patch Transdermal daily  melatonin 3 milliGRAM(s) Oral at bedtime PRN  ondansetron Injectable 4 milliGRAM(s) IV Push every 8 hours PRN  pantoprazole    Tablet 40 milliGRAM(s) Oral before breakfast  senna 2 Tablet(s) Oral at bedtime  simethicone 80 milliGRAM(s) Chew two times a day PRN      ROS:   General:  No  fevers, chills, night sweats, fatigue  Eyes:  Good vision, no reported pain  ENT:  No sore throat, pain, runny nose  CV:  No pain, palpitations  Pulm:  No dyspnea, cough  GI:  See HPI, otherwise negative  :  No  incontinence, nocturia  Muscle:  No pain, weakness  Neuro:  No memory problems  Psych:  No insomnia, mood problems, depression  Endocrine:  No polyuria, polydipsia, cold/heat intolerance  Heme:  No petechiae, ecchymosis, easy bruisability  Skin:  No rash    PHYSICAL EXAM:   Vital Signs:  Vital Signs Last 24 Hrs  T(C): 37.4 (05 Oct 2021 06:53), Max: 37.4 (05 Oct 2021 06:53)  T(F): 99.4 (05 Oct 2021 06:53), Max: 99.4 (05 Oct 2021 06:53)  HR: 94 (05 Oct 2021 06:53) (72 - 94)  BP: 138/60 (05 Oct 2021 06:53) (132/84 - 144/75)  BP(mean): --  RR: 18 (05 Oct 2021 06:53) (18 - 19)  SpO2: 100% (05 Oct 2021 06:53) (100% - 100%)  Daily     Daily     GENERAL: no acute distress  NEURO: alert  HEENT: anicteric sclera, no conjunctival pallor appreciated  CHEST: no respiratory distress, no accessory muscle use  CARDIAC: regular rate, rhythm  ABDOMEN: soft, non-tender, non-distended, no rebound or guarding  EXTREMITIES: warm, well perfused, no edema  SKIN: no lesions noted    LABS: reviewed                        7.7    5.54  )-----------( 268      ( 05 Oct 2021 07:45 )             24.4     10-05    140  |  105  |  7   ----------------------------<  144<H>  4.2   |  23  |  0.77    Ca    9.1      05 Oct 2021 07:42  Phos  3.5     10-05  Mg     1.90     10-05          Interval Diagnostic Studies: see sunrise for full report

## 2021-10-05 NOTE — PROGRESS NOTE ADULT - PROBLEM SELECTOR PLAN 1
Hx of prior GIB 2/2 PUD w/ H pylori, s/p full course of tx on 3/2021. Has been taking several Alleve per day for new back pain. Hgb stable. No more signs of significant hematochezia.   - EGD on 9/30 significant for Esophagitis, however no sign of active bleeding  - Colonoscopy 10/1 positive for diverticulosis w/o evidence of active bleeding, however this was noted to be the likely source of the patient's bleeding.   - Prior GI Hx: 5/17/21 - colonoscopy 2 polyps in ascending and transverse colon. 6/2021 - Capsule study negative for bleed  - Patient strongly advised to avoid all NSAIDs from now on   - Patient will need to f/u w/ Dr. Gordillo as an outpatient given evidence of some streaks in blood, likely in the setting of hemorrhoids. This does not seem to be significant in terms of patient's Hgb.

## 2021-10-05 NOTE — PROGRESS NOTE ADULT - PROBLEM SELECTOR PROBLEM 4
Lumbosacral radiculopathy
Iron deficiency anemia
Lower back pain
Iron deficiency anemia
Lower back pain
Iron deficiency anemia

## 2021-10-05 NOTE — PROGRESS NOTE ADULT - PROBLEM SELECTOR PLAN 8
DVTppx, SCDs, no Heparin sq due to active GIB   Diet: NPO for possible procedure   Dispo: in patient management
History of fibroids, followed by GYN outpatient  - no active vaginal bleeding or hematuria  - plan for outpatient GYN procedure
DVTppx, SCDs, no Heparin sq due to active GIB   Diet: Consistent Carbs   Dispo: in patient management
DVTppx, SCDs, no Heparin sq due to active GIB   Diet: NPO for possible procedure   Dispo: in patient management

## 2021-10-05 NOTE — PROGRESS NOTE ADULT - PROBLEM SELECTOR PLAN 6
History of Type II diabetes currently on metformin and Farxiga at home  A1c 6.8 in patient  - Continue SSI  - Consistent carb diet once patient has completed any interventions
PH 7.29 on VBG 9/30. Likely in the setting of diarrhea 2/2 acute blood loss.   - Transfusing blood since diarrhea in the setting of blood loss
History of Type II diabetes currently on metformin and Farxiga at home  A1c 6.8 in patient  - Continue SSI  - Consistent carb diet once patient has completed any interventions
History of Type II diabetes currently on metformin and Farxiga at home  A1c 6.8 in patient  - Continue SSI  - Consistent carb diet once patient has completed any interventions
PH 7.29 on VBG 9/30. Likely in the setting of diarrhea 2/2 acute blood loss.   - Transfusing blood since diarrhea in the setting of blood loss
PH 7.29 on VBG 9/30. Likely in the setting of diarrhea 2/2 acute blood loss.   - Transfusing blood since diarrhea in the setting of blood loss

## 2021-10-05 NOTE — PROGRESS NOTE ADULT - ATTENDING COMMENTS
No further overt bleeding.   Hgb remains stable. BUN/Cr normalized.   No plan for further GI intervention at this time.
Patient seen in late morning. Reported recurrent bleeding. In contrast to prior bleeding last week (black, tarry), she notes recently having normal appearing stool mixed with bright red blood and clots. Currently remains HD stable. Labs with stable Hgb this morning and normalization of BUN/Cr. Suspect initial bleeding due to UGIB given melenic appearance, accelerated azotemia. Most recent bleeding concerning for LGIB source, possibly diverticular (noted on recent colonoscopy). If ongoing bleeding, may need to repeat colonoscopy +/- VCE.
Pt seen and examined; case reviewed with housestaff. pt with bloody BMs overnight - filled ramin on bed.  denies feeling lightheaded or sob.  denies abd pain.  would discuss with GI - would check cbc bid and monitor for further bloody BMs.
Pt seen and examined; case reviewed with housestaff. Pt with acute blood loss anemia likely due to GIB -transfused 2nd unit PRBC this am; cbc stable.  s/p EGD which did not reveal obvious source.  appreciate GI recs - plan for Cscope in am.  pt counseled on not using nsaids.
Pt seen and examined; case reviewed with housestaff. follow up Cscope today.
Pt seen and examined; case reviewed with housestaff. no bloody bms overnight.  afebrile.  will discuss with GI.  discharge planning and coordination ~ 45mins.
Pt seen and examined; case reviewed with housestaff. pt with bloody bm today - appreciate gi follow up - start clears now.  will follow with gi regarding procedure.
Pt seen and examined; case reviewed with housestaff. no further bloody BMs.  no further inpt workup as per GI- outpt follow up.  discharge planning and coordination ~ 45mins.

## 2021-10-05 NOTE — PROGRESS NOTE ADULT - PROVIDER SPECIALTY LIST ADULT
Anesthesia
Anesthesia
Gastroenterology
Gastroenterology
Internal Medicine

## 2021-10-05 NOTE — PROGRESS NOTE ADULT - PROBLEM SELECTOR PLAN 7
History of fibroids, followed by GYN outpatient  - no active vaginal bleeding or hematuria  - plan for outpatient GYN procedure
History of fibroids, followed by GYN outpatient  - no active vaginal bleeding or hematuria  - plan for outpatient GYN procedure
History of Type II diabetes currently on metformin and Farxiga at home  A1c 6.8 in patient  - Continue SSI  - Consistent carb diet once patient has completed any interventions
History of fibroids, followed by GYN outpatient  - no active vaginal bleeding or hematuria  - plan for outpatient GYN procedure
History of Type II diabetes currently on metformin and Farxiga at home  A1c 6.8 in patient  - Continue SSI  - Consistent carb diet once patient has completed any interventions
History of Type II diabetes currently on metformin and Farxiga at home  A1c 6.8 in patient  - Continue SSI  - Consistent carb diet once patient has completed any interventions

## 2021-10-05 NOTE — PROGRESS NOTE ADULT - SUBJECTIVE AND OBJECTIVE BOX
Eliu Chapin MD  Internal Medicine, PGY-1  Pager: [phone] / [facility]: [phone]    SUBJECTIVE / OVERNIGHT EVENTS:  - Pt seen and examined at bedside  - HAKAN  - Patient reports that she has not had any BMs overnight, remains asymptomatic w/o any evidence of dizziness or nausea/vomiting. Patient stated that she's just been having a clear liquid diet overnight. No nausea, vomiting, fevers or chills.     MEDICATIONS  (STANDING):  dextrose 40% Gel 15 Gram(s) Oral once  dextrose 5%. 1000 milliLiter(s) (50 mL/Hr) IV Continuous <Continuous>  dextrose 5%. 1000 milliLiter(s) (100 mL/Hr) IV Continuous <Continuous>  dextrose 50% Injectable 25 Gram(s) IV Push once  dextrose 50% Injectable 12.5 Gram(s) IV Push once  dextrose 50% Injectable 25 Gram(s) IV Push once  glucagon  Injectable 1 milliGRAM(s) IntraMuscular once  insulin lispro (ADMELOG) corrective regimen sliding scale   SubCutaneous three times a day before meals  insulin lispro (ADMELOG) corrective regimen sliding scale   SubCutaneous at bedtime  lidocaine   4% Patch 1 Patch Transdermal daily  pantoprazole    Tablet 40 milliGRAM(s) Oral before breakfast  senna 2 Tablet(s) Oral at bedtime    MEDICATIONS  (PRN):  acetaminophen   Tablet .. 650 milliGRAM(s) Oral every 6 hours PRN Temp greater or equal to 38C (100.4F), Mild Pain (1 - 3)  cyclobenzaprine 5 milliGRAM(s) Oral two times a day PRN Muscle Spasm  melatonin 3 milliGRAM(s) Oral at bedtime PRN Insomnia  ondansetron Injectable 4 milliGRAM(s) IV Push every 8 hours PRN Nausea and/or Vomiting  simethicone 80 milliGRAM(s) Chew two times a day PRN Dyspepsia      PHYSICAL EXAM:  Vital Signs Last 24 Hrs  T(C): 37.4 (05 Oct 2021 06:53), Max: 37.4 (05 Oct 2021 06:53)  T(F): 99.4 (05 Oct 2021 06:53), Max: 99.4 (05 Oct 2021 06:53)  HR: 94 (05 Oct 2021 06:53) (72 - 94)  BP: 138/60 (05 Oct 2021 06:53) (132/84 - 144/75)  BP(mean): --  RR: 18 (05 Oct 2021 06:53) (18 - 19)  SpO2: 100% (05 Oct 2021 06:53) (100% - 100%)    CAPILLARY BLOOD GLUCOSE      POCT Blood Glucose.: 146 mg/dL (05 Oct 2021 08:30)  POCT Blood Glucose.: 161 mg/dL (04 Oct 2021 21:36)  POCT Blood Glucose.: 103 mg/dL (04 Oct 2021 17:28)  POCT Blood Glucose.: 208 mg/dL (04 Oct 2021 12:05)    I&O's Summary      CONSTITUTIONAL: NAD, well-developed  RESPIRATORY: Normal respiratory effort; lungs are clear to auscultation bilaterally  CARDIOVASCULAR: Regular rate and rhythm, normal S1 and S2, no murmur/rub/gallop; No lower extremity edema; Peripheral pulses are 2+ bilaterally  ABDOMEN: Nontender to palpation, normoactive bowel sounds, no rebound/guarding; No hepatosplenomegaly  MUSCLOSKELETAL: no clubbing or cyanosis of digits; no joint swelling or tenderness to palpation  PSYCH: A+O to person, place, and time; affect appropriate    LABS:                        7.7    5.54  )-----------( 268      ( 05 Oct 2021 07:45 )             24.4     10-05    140  |  105  |  7   ----------------------------<  144<H>  4.2   |  23  |  0.77    Ca    9.1      05 Oct 2021 07:42  Phos  3.5     10-05  Mg     1.90     10-05      PT/INR - ( 05 Oct 2021 07:42 )   PT: 11.6 sec;   INR: 1.01 ratio                     IMAGING:

## 2021-10-05 NOTE — PROGRESS NOTE ADULT - PROBLEM SELECTOR PROBLEM 6
Type 2 diabetes mellitus with hyperglycemia, without long-term current use of insulin
Type 2 diabetes mellitus with hyperglycemia, without long-term current use of insulin
Normal anion gap metabolic acidosis
Type 2 diabetes mellitus with hyperglycemia, without long-term current use of insulin
Normal anion gap metabolic acidosis
Normal anion gap metabolic acidosis

## 2021-10-05 NOTE — PROGRESS NOTE ADULT - PROBLEM SELECTOR PLAN 9
DVTppx, SCDs, no Heparin sq due to active GIB   Diet: Consistent Carbs   Dispo: in patient management, will d/c home if normal BM DVTppx: SCDs  Diet: Consistent Carbs   Dispo: home

## 2021-10-05 NOTE — PROGRESS NOTE ADULT - PROBLEM SELECTOR PROBLEM 5
Lower back pain
Lower back pain
Normal anion gap metabolic acidosis
Lower back pain
Normal anion gap metabolic acidosis
Normal anion gap metabolic acidosis

## 2021-10-05 NOTE — PROGRESS NOTE ADULT - ASSESSMENT
71yo Female w/ mhx of uterine leiomyomas, iron deficiency anemia, HTN, recent hx of H. Pylori infection, lower back pain (taking Aleve) admitted for melena and severe anemia requiring transfusions. EGD negative for source of GIB and colonoscopy showed diverticulosis as likely culprit of acute bleeding. Patient Hgb stable, no  signs of hematochezia today. Will likely go home w/ close f/u w/ Dr. Gordillo.

## 2021-10-05 NOTE — PROGRESS NOTE ADULT - PROBLEM SELECTOR PLAN 4
Hx of ALFREDO, unclear etiology, may be in the setting of vaginal bleeding from leiomyomas vs occult GIB   Has been receiving weekly iron infusions - follows Dr. Flynn. as an outpatient  Per outpatient hx - patient baseline over this year is 8-8.7.   Iron studies otherwise WNL however this is within the setting of regular iron infusions  - Monitoring CBCs at this time
Patient w/ complaints of lumbosacral pain. CT abd was negative for any osseous abnormality in lumbosacral region. Likely soft tissue pain.   - PRN Tylenol   - PRN Flexeril  - PT eval once patient more HDS  - If worsening will work up further
Patient w/ complaints of lumbosacral pain. CT abd was negative for any osseous abnormality in lumbosacral region. Likely soft tissue pain. Improving this admission.   - PRN Tylenol   - PRN Flexeril  - PT eval once patient more HDS
Hx of ALFREDO, unclear etiology, may be in the setting of vaginal bleeding from leiomyomas vs occult GIB   Has been receiving weekly iron infusions - follows Dr. Flynn. as an outpatient  Per outpatient hx - patient baseline over this year is 8-8.7.   Iron studies otherwise WNL however this is within the setting of regular iron infusions  - Monitoring CBCs at this time
Hx of ALFREDO, unclear etiology, may be in the setting of vaginal bleeding from leiomyomas vs occult GIB   Has been receiving weekly iron infusions - follows Dr. Flynn. as an outpatient  Per outpatient hx - patient baseline over this year is 8-8.7.   Iron studies otherwise WNL however this is within the setting of regular iron infusions  - Monitoring CBCs at this time  - Will consider further work up of anemia if patient's anemia does not resolve post GI intervention and transfusion.
Patient w/ complaints of lumbosacral pain. CT abd was negative for any osseous abnormality in lumbosacral region. Likely soft tissue pain. Improving this admission.   - PRN Tylenol   - PRN Flexeril  - PT eval once patient more HDS

## 2021-10-07 LAB
CULTURE RESULTS: SIGNIFICANT CHANGE UP
CULTURE RESULTS: SIGNIFICANT CHANGE UP
SPECIMEN SOURCE: SIGNIFICANT CHANGE UP
SPECIMEN SOURCE: SIGNIFICANT CHANGE UP

## 2022-11-02 NOTE — H&P ADULT - MS GEN HX ROS MEA POS PC
back pain Complex Repair And Graft Additional Text (Will Appearing After The Standard Complex Repair Text): The complex repair was not sufficient to completely close the primary defect. The remaining additional defect was repaired with the graft mentioned below.

## 2023-01-01 NOTE — H&P ADULT - PSYCHIATRIC DETAILS
"2023  Birth 02/11/23 1200)  Pulse: (!) 167 (02/11/23 1200)  Resp: 47 (02/11/23 1200)  BP: (!) 88/40 (02/10/23 2100)  SpO2: (!) 100 % (02/11/normal affect/normal behavior

## 2023-05-08 NOTE — ED ADULT NURSE NOTE - CCCP TRG CHIEF CMPLNT
[facility]  [place] [address]  Physical Therapy Initial Evaluation    Date: 5/8/2023   Name: Salome Capps ([facility])  Clinic Number: [id number]    Therapy Diagnosis:   Encounter Diagnoses   Name Primary?    Acute pain of right knee     Knee stiffness, right     Weakness      Physician: Steven Mora MD    Physician Orders: PT Eval and Treat   Medical Diagnosis from Referral: M25.561 (ICD-10-CM) - Acute pain of right knee  Evaluation Date: 5/8/2023  Authorization Period Expiration: 12/31/2023  Plan of Care Expiration: 8/1/2023  Visit # / Visits authorized: 1/ 1    Time In: 14:04  Time Out: 15:00  Total Appointment Time (timed & untimed codes): 56 minutes    Precautions: Standard    Subjective   Date of onset: 4/4/2023  History of current condition - Salome reports: onset of right knee pain on 4/4 during a softball game. She went up for a pop fly and came down on her right knee. She consulted with Dr. Peguero who ordered an MRI and referred to Dr. Andrade. She was referred to PT and plans to follow up with Dr. Andrade in 4 weeks.   Medical History:   No past medical history on file.    Surgical History:   Salome Capps  has no past surgical history on file.    Medications:   Salome currently has no medications in their medication list.    Allergies:   Review of patient's allergies indicates:  No Known Allergies     Imaging, MRI Right Knee (4/20/2023):  Impression:   1. High-grade partial or complete tear of the PCL.  Meniscofemoral ligament appears to traverse the injury site.    Prior Therapy: none  Social History:  lives with their family  Occupation: Senior at [employer]; plays softball and soccer; plans to play next year at [employer]  Prior Level of Function: able to play sports, run, jump without limitations or pain  Current Level of Function: unable to play sports, run, or jump    Pain:  Current 0/10, worst 8/10, best 0/10   Location: { right knee(s)    Description: Dull  Aggravating Factors: Bending and running and jumping  Easing Factors: rest    Pts goals: get back to sports; run without pain    Objective       Range of Motion (Passive):   Knee Right  Left    Flexion 130 140   Extension +5 +8     Range of Motion (Active):   Knee Right  Left    Flexion 120 135   Extension 0 0       Lower Extremity Strength  Right LE  Left LE    Quadriceps: 4+/5 Quadriceps: 5/5   Hamstrings: 4-/5 Hamstrings: 4/5   Hip flexion (supine): 4+/5 Hip flexion (supine): 5/5   Hip extension:  4-/5 Hip extension: 4/5   PGM: 4/5 PGM:  4+/5     Special Tests:   Right Left   Lachman's test negative negative   Posterior Drawer positive negative       Girth Measurement Joint line   Right 34 cm   Left 33.5 cm     Functional tests:   SL squat: knee valgus and reduced depth bilaterally  DL squat: able to achieve parallel but unable to go below parallel    Gait Pattern: Patient demo no compensations during normal walking.       Limitation/Restriction for FOTO knee Survey    Therapist reviewed FOTO scores for Salome Capps on 5/8/2023.   FOTO documents entered into EPIC - see Media section.    Limitation Score: 41%         TREATMENT   Treatment Time In: 14:30  Treatment Time Out: 15:00  Total Treatment time (time-based codes) separate from Evaluation: 30 minutes    Salome received therapeutic exercises to develop strength, endurance, ROM, flexibility, and posture for 25 minutes including:  SLR longsitting 2x15  Glut bridge 2x15  Hamstring bridge 2x15  Hip abduction sidelying 2x15  Split stance holds 3 x 1 min    Home Exercises and Patient Education Provided    Education provided:   - HEP, current condition, PT plan    Written Home Exercises Provided: yes.  Exercises were reviewed and patient was able to demonstrate them prior to the end of the session.  Patient demonstrated good  understanding of the education provided.     See EMR under Patient Instructions for exercisesprovided  5/8/2023.    Assessment   Salome is a 19 y.o. female referred to outpatient Physical Therapy with a medical diagnosis of acute pain of right knee. Pt presents with limited knee flexion, quad dominant approach, knee laxity, hamstring and glute weakness, and impaired functional movement.     Pt prognosis is Excellent.   Pt will benefit from skilled outpatient Physical Therapy to address the deficits stated above and in the chart below, provide pt/family education, and to maximize pt's level of independence.     Plan of care discussed with patient: Yes  Pt's spiritual, cultural and educational needs considered and patient is agreeable to the plan of care and goals as stated below:     Anticipated Barriers for therapy: none    Medical Necessity is demonstrated by the following  History  Co-morbidities and personal factors that may impact the plan of care Co-morbidities:   none    Personal Factors:   no deficits     low   Examination  Body Structures and Functions, activity limitations and participation restrictions that may impact the plan of care Body Regions:   lower extremities    Body Systems:    ROM  strength  balance  transitions  motor control    Participation Restrictions:   covid-19    Activity limitations:   Learning and applying knowledge  no deficits    General Tasks and Commands  no deficits    Communication  no deficits    Mobility  walking    Self care  no deficits    Domestic Life  no deficits    Interactions/Relationships  no deficits    Life Areas  no deficits    Community and Social Life  no deficits         low   Clinical Presentation stable and uncomplicated low   Decision Making/ Complexity Score: low     Goals:  Short term goals: 4 weeks  Patient will become independent in HEP for maximal gains made in PT.  Patient to improve ROM to 0-130 for improvements in transitional movement.  Patient to restore normal gait pattern without compensations for improved community ambulation.      Long term goals:  12 weeks  Patient to demo 0-140 degrees of ROM for improved squatting movements.  Patient will improve strength to 5/5 for improved performance of household duties.  Patient will improve FOTO to >/= 80.    Plan   Plan of care Certification: 5/8/2023 to 8/1/2023.    Outpatient Physical Therapy 2 times weekly for 3 months to include the following interventions: Manual Therapy, Moist Heat/ Ice, Neuromuscular Re-ed, Patient Education, Therapeutic Activities, and Therapeutic Exercise.     Will go to [facility] on July 28. To follow up with Dr. Andrade in 4 weeks.    Yael Powell, PT, DPT, OCS     black stool

## 2023-11-17 PROBLEM — K64.8 OTHER HEMORRHOIDS: Chronic | Status: ACTIVE | Noted: 2021-09-30

## 2023-11-17 PROBLEM — K21.9 GASTRO-ESOPHAGEAL REFLUX DISEASE WITHOUT ESOPHAGITIS: Chronic | Status: ACTIVE | Noted: 2021-09-30

## 2024-01-05 ENCOUNTER — APPOINTMENT (OUTPATIENT)
Dept: UROLOGY | Facility: CLINIC | Age: 74
End: 2024-01-05
Payer: MEDICARE

## 2024-01-05 ENCOUNTER — LABORATORY RESULT (OUTPATIENT)
Age: 74
End: 2024-01-05

## 2024-01-05 VITALS
SYSTOLIC BLOOD PRESSURE: 132 MMHG | OXYGEN SATURATION: 98 % | RESPIRATION RATE: 16 BRPM | HEIGHT: 61 IN | WEIGHT: 197 LBS | DIASTOLIC BLOOD PRESSURE: 73 MMHG | BODY MASS INDEX: 37.19 KG/M2 | TEMPERATURE: 97.5 F | HEART RATE: 79 BPM

## 2024-01-05 PROBLEM — Z00.00 ENCOUNTER FOR PREVENTIVE HEALTH EXAMINATION: Status: ACTIVE | Noted: 2024-01-05

## 2024-01-05 PROCEDURE — 99204 OFFICE O/P NEW MOD 45 MIN: CPT

## 2024-01-05 NOTE — ASSESSMENT
[FreeTextEntry1] : OAB wet--UUI predominant --Decrease caffeine --Trial Oxybutynin --RTC in 1mo --Further consideration of fibroid tx. will refer to IR

## 2024-01-05 NOTE — PHYSICAL EXAM
[Normal Appearance] : normal appearance [Well Groomed] : well groomed [General Appearance - In No Acute Distress] : no acute distress [Edema] : no peripheral edema [Respiration, Rhythm And Depth] : normal respiratory rhythm and effort [Exaggerated Use Of Accessory Muscles For Inspiration] : no accessory muscle use [Abdomen Soft] : soft [Abdomen Tenderness] : non-tender [Costovertebral Angle Tenderness] : no ~M costovertebral angle tenderness [Urinary Bladder Findings] : the bladder was normal on palpation [Normal Station and Gait] : the gait and station were normal for the patient's age [] : no rash [No Focal Deficits] : no focal deficits [Oriented To Time, Place, And Person] : oriented to person, place, and time [Mood] : the mood was normal [Affect] : the affect was normal [No Palpable Adenopathy] : no palpable adenopathy [Urethral Meatus] : normal urethra [External Female Genitalia] : normal external genitalia [de-identified] : palpable fibroid just below level of fibroid [de-identified] : no rela prolapse [de-identified] : R arm sling for dislocation

## 2024-01-05 NOTE — HISTORY OF PRESENT ILLNESS
[FreeTextEntry1] : 73 year old female with cc of urinary frequency and urinary incontinence. pt reports bothersome frequency both day and night. Pt is going every 2.5h during the day. Nocturia x3-4. She endorses urinary urgency. No straining or feelings of incomplete emptying. She reports bothersome leakage. THis is most common at night when lying in bed. She will leak sometimes in bed and not really have warning. Has urge leakage both day and night. Rare stress incontinence. Is wearing 2 pads overnight. SHe is wearing one pad during the day and has some with her. SOmetimes (50/50) pads are dry.   Pt drinks water and 2 cups of coffee. Occasional diet pepsi. No issues with constipation. Has hx of fibroids and has been told they are very large. She has a hx of embo in the past for this (~2000). No sensation of prolapse. Zero pregnancies. No abd/pelvic surgeries. No issues with UTIs. Has hx of DM, a1c 7.4 (as she recalls).

## 2024-01-08 LAB
APPEARANCE: CLEAR
BILIRUBIN URINE: NEGATIVE
BLOOD URINE: NEGATIVE
COLOR: YELLOW
GLUCOSE QUALITATIVE U: NEGATIVE MG/DL
KETONES URINE: NEGATIVE MG/DL
LEUKOCYTE ESTERASE URINE: NEGATIVE
NITRITE URINE: NEGATIVE
PH URINE: 5.5
PROTEIN URINE: 30 MG/DL
SPECIFIC GRAVITY URINE: 1.02
UROBILINOGEN URINE: 0.2 MG/DL

## 2024-01-19 ENCOUNTER — APPOINTMENT (OUTPATIENT)
Dept: MRI IMAGING | Facility: CLINIC | Age: 74
End: 2024-01-19
Payer: MEDICARE

## 2024-01-19 ENCOUNTER — OUTPATIENT (OUTPATIENT)
Dept: OUTPATIENT SERVICES | Facility: HOSPITAL | Age: 74
LOS: 1 days | End: 2024-01-19
Payer: MEDICARE

## 2024-01-19 DIAGNOSIS — D25.9 LEIOMYOMA OF UTERUS, UNSPECIFIED: ICD-10-CM

## 2024-01-19 DIAGNOSIS — Z98.49 CATARACT EXTRACTION STATUS, UNSPECIFIED EYE: Chronic | ICD-10-CM

## 2024-01-19 DIAGNOSIS — N39.41 URGE INCONTINENCE: ICD-10-CM

## 2024-01-19 PROCEDURE — 72197 MRI PELVIS W/O & W/DYE: CPT

## 2024-01-19 PROCEDURE — A9585: CPT

## 2024-01-19 PROCEDURE — 72197 MRI PELVIS W/O & W/DYE: CPT | Mod: 26,MH

## 2024-01-23 ENCOUNTER — APPOINTMENT (OUTPATIENT)
Dept: INTERVENTIONAL RADIOLOGY/VASCULAR | Facility: CLINIC | Age: 74
End: 2024-01-23
Payer: MEDICARE

## 2024-01-23 DIAGNOSIS — Z60.2 PROBLEMS RELATED TO LIVING ALONE: ICD-10-CM

## 2024-01-23 DIAGNOSIS — R35.1 NOCTURIA: ICD-10-CM

## 2024-01-23 DIAGNOSIS — S43.006A UNSPECIFIED DISLOCATION OF UNSPECIFIED SHOULDER JOINT, INITIAL ENCOUNTER: ICD-10-CM

## 2024-01-23 DIAGNOSIS — Z78.9 OTHER SPECIFIED HEALTH STATUS: ICD-10-CM

## 2024-01-23 DIAGNOSIS — Z91.81 HISTORY OF FALLING: ICD-10-CM

## 2024-01-23 DIAGNOSIS — R35.0 FREQUENCY OF MICTURITION: ICD-10-CM

## 2024-01-23 PROCEDURE — 99203 OFFICE O/P NEW LOW 30 MIN: CPT

## 2024-01-23 SDOH — SOCIAL STABILITY - SOCIAL INSECURITY: PROBLEMS RELATED TO LIVING ALONE: Z60.2

## 2024-01-23 NOTE — PHYSICAL EXAM
[Alert] : alert [Normal Voice/Communication] : normal voice communication [Oriented x3] : oriented to person, place, and time [Normal Insight/Judgement] : insight and judgment were intact [Recent Memory Normal] : recent memory was not impaired [Remote Memory Normal] : remote memory was not impaired [Ambulatory and capable of all selfcare but unable to carry out any work activities] : Ambulatory and capable of all selfcare but unable to carry out any work activities. Up and about more than 50% of waking hours

## 2024-02-01 ENCOUNTER — APPOINTMENT (OUTPATIENT)
Dept: OBGYN | Facility: CLINIC | Age: 74
End: 2024-02-01
Payer: MEDICARE

## 2024-02-01 VITALS
HEART RATE: 93 BPM | DIASTOLIC BLOOD PRESSURE: 90 MMHG | HEIGHT: 61 IN | SYSTOLIC BLOOD PRESSURE: 172 MMHG | WEIGHT: 197 LBS | BODY MASS INDEX: 37.19 KG/M2

## 2024-02-01 VITALS — SYSTOLIC BLOOD PRESSURE: 155 MMHG | DIASTOLIC BLOOD PRESSURE: 85 MMHG

## 2024-02-01 PROCEDURE — 99204 OFFICE O/P NEW MOD 45 MIN: CPT

## 2024-02-02 ENCOUNTER — APPOINTMENT (OUTPATIENT)
Dept: UROLOGY | Facility: CLINIC | Age: 74
End: 2024-02-02
Payer: MEDICARE

## 2024-02-02 VITALS
RESPIRATION RATE: 16 BRPM | DIASTOLIC BLOOD PRESSURE: 81 MMHG | OXYGEN SATURATION: 97 % | SYSTOLIC BLOOD PRESSURE: 154 MMHG | HEART RATE: 75 BPM | TEMPERATURE: 97.5 F

## 2024-02-02 PROCEDURE — G2211 COMPLEX E/M VISIT ADD ON: CPT

## 2024-02-02 PROCEDURE — 99213 OFFICE O/P EST LOW 20 MIN: CPT

## 2024-02-02 RX ORDER — MIRABEGRON 50 MG/1
50 TABLET, FILM COATED, EXTENDED RELEASE ORAL
Qty: 30 | Refills: 11 | Status: ACTIVE | COMMUNITY
Start: 2024-02-02 | End: 1900-01-01

## 2024-02-02 RX ORDER — OXYBUTYNIN CHLORIDE 5 MG/1
5 TABLET, EXTENDED RELEASE ORAL DAILY
Qty: 30 | Refills: 11 | Status: DISCONTINUED | COMMUNITY
Start: 2024-01-05 | End: 2024-02-02

## 2024-02-02 NOTE — HISTORY OF PRESENT ILLNESS
[FreeTextEntry1] : 73 year old female with cc of urinary frequency and urinary incontinence. pt reports bothersome frequency both day and night. Pt is going every 2.5h during the day. Nocturia x3-4. She endorses urinary urgency. No straining or feelings of incomplete emptying. She reports bothersome leakage. THis is most common at night when lying in bed. She will leak sometimes in bed and not really have warning. Has urge leakage both day and night. Rare stress incontinence. Is wearing 2 pads overnight. SHe is wearing one pad during the day and has some with her. SOmetimes (50/50) pads are dry.   Pt drinks water and 2 cups of coffee. Occasional diet pepsi. No issues with constipation. Has hx of fibroids and has been told they are very large. She has a hx of embo in the past for this (~2000). No sensation of prolapse. Zero pregnancies. No abd/pelvic surgeries. No issues with UTIs. Has hx of DM, a1c 7.4 (as she recalls).   Pt was started on oxybutynin. She has noted no changes despite medication. Still with very bothersome frequency both day and night. Since last visit, also had MRI eval. Has multiple 5-9 cm fibroids. Not ammenable to embo after I sent pt to IR. Sent to GYN for eval. Planning on bx to ensure no adverse pathology and then procedure for fibroids.

## 2024-02-02 NOTE — ASSESSMENT
[FreeTextEntry1] : OAB wet--UUI predominant. No benefit with anticholinergic oxybutynin. Continues to be evaluated for firboids.  --D/c Oxybutynin --Trial of myrbetriq --RTC after GYN proceure(s)

## 2024-02-02 NOTE — REVIEW OF SYSTEMS
[see HPI] : see HPI [Wake up at night to urinate  How many times?  ___] : wakes up to urinate [unfilled] times during the night [Leakage of urine with urgency] : leakage of urine with urgency [Unaware of when urine is leaking] : unaware of when urine is leaking [Negative] : Heme/Lymph

## 2024-02-02 NOTE — PHYSICAL EXAM
[General Appearance - In No Acute Distress] : no acute distress [] : no respiratory distress [Exaggerated Use Of Accessory Muscles For Inspiration] : no accessory muscle use [No Focal Deficits] : no focal deficits [Oriented To Time, Place, And Person] : oriented to person, place, and time [General Appearance - Well Developed] : well developed [General Appearance - Well Nourished] : well nourished

## 2024-02-03 NOTE — HISTORY OF PRESENT ILLNESS
[FreeTextEntry1] : 74 yo P0 here for eval and management of ut myomas. One year ago pt was distressed by size of fibroid. She also had urinary incontinence and urinary frequency. H/o UAE ~ 10 yrs ago. Fibroids may be growing, now w/occasional lower abd pain.  Pt interested in minimally invasive procedure.  Referred by Dr. Jones

## 2024-02-03 NOTE — PHYSICAL EXAM
[FreeTextEntry7] : Soft, NT, palpable uterus ~ 21 wks  [Labia Majora] : normal [Normal] : normal [FreeTextEntry6] : ~22 wks, bulky, irregular, mobile

## 2024-02-03 NOTE — DISCUSSION/SUMMARY
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 Pt w/mass effect symptoms. No PMB. Urinary issues likely secondary to mass effect. H/o UAE ~ 10 yrs ago. MRI reviewed. No obvious evidence of malignancy. Pt interested in MIGS procedure. We spoke about concerns for malignancy despite obvious signs. Pt understand and "would like to take that chance".  Plan RTO for EMB Schedule [facility] BSO (d/c home POD#1) Will reevaluate urinary concerns after surgery  Letter to Dr. Jones

## 2024-03-07 ENCOUNTER — APPOINTMENT (OUTPATIENT)
Dept: OBGYN | Facility: CLINIC | Age: 74
End: 2024-03-07
Payer: MEDICARE

## 2024-03-07 VITALS
DIASTOLIC BLOOD PRESSURE: 91 MMHG | HEART RATE: 87 BPM | HEIGHT: 61 IN | BODY MASS INDEX: 38.33 KG/M2 | SYSTOLIC BLOOD PRESSURE: 176 MMHG | WEIGHT: 203 LBS

## 2024-03-07 PROCEDURE — 58100 BIOPSY OF UTERUS LINING: CPT

## 2024-03-07 NOTE — PROCEDURE
[Endometrial Biopsy] : Endometrial biopsy [Risks] : risks [Infection] : infection [Tenaculum] : Tenaculum [Easy Passage] : Easy passage [Sounded to ___ cm] : sounded to [unfilled] ~Ucm [Scant] : scant [Specimen Collected] : collected [Tolerated Well] : Patient tolerated the procedure well [No Complications] : No complications [de-identified] : preop

## 2024-03-07 NOTE — DISCUSSION/SUMMARY
[FreeTextEntry1] : 72 yo P0 w/uterine myomas. Pt w/mass effect symptoms. No PMB. Urinary issues likely secondary to mass effect. H/o UAE ~ 10 yrs ago. Now s/p EMB - scant tissue.  Plan F/u EMB - would not repeat if insufficient Keep appt for surgery Med clearance for htn Plan for overnight stay after surgery.

## 2024-03-07 NOTE — HISTORY OF PRESENT ILLNESS
[FreeTextEntry1] : 72 yo P0 w/uterine myomas. Pt w/mass effect symptoms. No PMB. Urinary issues likely secondary to mass effect. H/o UAE ~ 10 yrs ago. Pt here for EMB prior to TLH/BSO

## 2024-03-16 LAB — CORE LAB BIOPSY: NORMAL

## 2024-05-08 ENCOUNTER — APPOINTMENT (OUTPATIENT)
Dept: OBGYN | Facility: CLINIC | Age: 74
End: 2024-05-08
Payer: MEDICARE

## 2024-05-08 PROCEDURE — 99215 OFFICE O/P EST HI 40 MIN: CPT

## 2024-05-08 NOTE — DISCUSSION/SUMMARY
[FreeTextEntry1] : 74 yo P0 here for preop chat prior to TLH/BSO 5/17/24. Pt waiting for med clearance (htn, DM) prior to surgery. Med clearance pending Heme clearance for anemia. BPs have been low, as per pt..  Will send info for new PCP. Pt needs management care for htn and DM. Pt to keep appt for PST tomorrow.

## 2024-05-08 NOTE — HISTORY OF PRESENT ILLNESS
[Home] : at home, [unfilled] , at the time of the visit. [Medical Office: ([facility])___] : at the medical office located in  [Verbal consent obtained from patient] : the patient, [unfilled] [FreeTextEntry1] : 74 yo P0 here for preop chat prior to TLH 5/17/24. Pt waiting for med clearance prior to surgery. Med clearance pending Heme clearance for anemia.   Will send info for new PCP.

## 2024-05-09 ENCOUNTER — OUTPATIENT (OUTPATIENT)
Dept: OUTPATIENT SERVICES | Facility: HOSPITAL | Age: 74
LOS: 1 days | End: 2024-05-09

## 2024-05-09 VITALS
RESPIRATION RATE: 16 BRPM | SYSTOLIC BLOOD PRESSURE: 159 MMHG | OXYGEN SATURATION: 97 % | DIASTOLIC BLOOD PRESSURE: 72 MMHG | HEART RATE: 60 BPM | WEIGHT: 199.96 LBS | HEIGHT: 60 IN | TEMPERATURE: 97 F

## 2024-05-09 DIAGNOSIS — C55 MALIGNANT NEOPLASM OF UTERUS, PART UNSPECIFIED: ICD-10-CM

## 2024-05-09 DIAGNOSIS — D25.9 LEIOMYOMA OF UTERUS, UNSPECIFIED: ICD-10-CM

## 2024-05-09 DIAGNOSIS — E11.9 TYPE 2 DIABETES MELLITUS WITHOUT COMPLICATIONS: ICD-10-CM

## 2024-05-09 DIAGNOSIS — Z98.890 OTHER SPECIFIED POSTPROCEDURAL STATES: Chronic | ICD-10-CM

## 2024-05-09 DIAGNOSIS — Z98.49 CATARACT EXTRACTION STATUS, UNSPECIFIED EYE: Chronic | ICD-10-CM

## 2024-05-09 DIAGNOSIS — Z91.89 OTHER SPECIFIED PERSONAL RISK FACTORS, NOT ELSEWHERE CLASSIFIED: ICD-10-CM

## 2024-05-09 LAB
A1C WITH ESTIMATED AVERAGE GLUCOSE RESULT: 7.6 % — HIGH (ref 4–5.6)
ANION GAP SERPL CALC-SCNC: 16 MMOL/L — HIGH (ref 7–14)
APPEARANCE UR: CLEAR — SIGNIFICANT CHANGE UP
BACTERIA # UR AUTO: NEGATIVE /HPF — SIGNIFICANT CHANGE UP
BILIRUB UR-MCNC: NEGATIVE — SIGNIFICANT CHANGE UP
BLD GP AB SCN SERPL QL: NEGATIVE — SIGNIFICANT CHANGE UP
BUN SERPL-MCNC: 22 MG/DL — SIGNIFICANT CHANGE UP (ref 7–23)
CALCIUM SERPL-MCNC: 9.2 MG/DL — SIGNIFICANT CHANGE UP (ref 8.4–10.5)
CAST: 0 /LPF — SIGNIFICANT CHANGE UP (ref 0–4)
CHLORIDE SERPL-SCNC: 105 MMOL/L — SIGNIFICANT CHANGE UP (ref 98–107)
CO2 SERPL-SCNC: 22 MMOL/L — SIGNIFICANT CHANGE UP (ref 22–31)
COLOR SPEC: YELLOW — SIGNIFICANT CHANGE UP
CREAT SERPL-MCNC: 0.94 MG/DL — SIGNIFICANT CHANGE UP (ref 0.5–1.3)
DIFF PNL FLD: NEGATIVE — SIGNIFICANT CHANGE UP
EGFR: 65 ML/MIN/1.73M2 — SIGNIFICANT CHANGE UP
ESTIMATED AVERAGE GLUCOSE: 171 — SIGNIFICANT CHANGE UP
GLUCOSE SERPL-MCNC: 122 MG/DL — HIGH (ref 70–99)
GLUCOSE UR QL: >=1000 MG/DL
HCT VFR BLD CALC: 26.1 % — LOW (ref 34.5–45)
HGB BLD-MCNC: 7.8 G/DL — LOW (ref 11.5–15.5)
KETONES UR-MCNC: NEGATIVE MG/DL — SIGNIFICANT CHANGE UP
LEUKOCYTE ESTERASE UR-ACNC: NEGATIVE — SIGNIFICANT CHANGE UP
MCHC RBC-ENTMCNC: 23.1 PG — LOW (ref 27–34)
MCHC RBC-ENTMCNC: 29.9 GM/DL — LOW (ref 32–36)
MCV RBC AUTO: 77.4 FL — LOW (ref 80–100)
NITRITE UR-MCNC: NEGATIVE — SIGNIFICANT CHANGE UP
NRBC # BLD: 0 /100 WBCS — SIGNIFICANT CHANGE UP (ref 0–0)
NRBC # FLD: 0 K/UL — SIGNIFICANT CHANGE UP (ref 0–0)
PH UR: 6.5 — SIGNIFICANT CHANGE UP (ref 5–8)
PLATELET # BLD AUTO: 347 K/UL — SIGNIFICANT CHANGE UP (ref 150–400)
POTASSIUM SERPL-MCNC: 3.7 MMOL/L — SIGNIFICANT CHANGE UP (ref 3.5–5.3)
POTASSIUM SERPL-SCNC: 3.7 MMOL/L — SIGNIFICANT CHANGE UP (ref 3.5–5.3)
PROT UR-MCNC: 30 MG/DL
RBC # BLD: 3.37 M/UL — LOW (ref 3.8–5.2)
RBC # FLD: 17.5 % — HIGH (ref 10.3–14.5)
RBC CASTS # UR COMP ASSIST: 1 /HPF — SIGNIFICANT CHANGE UP (ref 0–4)
RH IG SCN BLD-IMP: POSITIVE — SIGNIFICANT CHANGE UP
SODIUM SERPL-SCNC: 143 MMOL/L — SIGNIFICANT CHANGE UP (ref 135–145)
SP GR SPEC: 1.02 — SIGNIFICANT CHANGE UP (ref 1–1.03)
SQUAMOUS # UR AUTO: 2 /HPF — SIGNIFICANT CHANGE UP (ref 0–5)
UROBILINOGEN FLD QL: 0.2 MG/DL — SIGNIFICANT CHANGE UP (ref 0.2–1)
WBC # BLD: 6.66 K/UL — SIGNIFICANT CHANGE UP (ref 3.8–10.5)
WBC # FLD AUTO: 6.66 K/UL — SIGNIFICANT CHANGE UP (ref 3.8–10.5)
WBC UR QL: 5 /HPF — SIGNIFICANT CHANGE UP (ref 0–5)

## 2024-05-09 RX ORDER — SODIUM CHLORIDE 9 MG/ML
3 INJECTION INTRAMUSCULAR; INTRAVENOUS; SUBCUTANEOUS EVERY 8 HOURS
Refills: 0 | Status: DISCONTINUED | OUTPATIENT
Start: 2024-05-24 | End: 2024-05-25

## 2024-05-09 RX ORDER — METFORMIN HYDROCHLORIDE 850 MG/1
1 TABLET ORAL
Qty: 0 | Refills: 0 | DISCHARGE

## 2024-05-09 RX ORDER — CHLORHEXIDINE GLUCONATE 213 G/1000ML
1 SOLUTION TOPICAL DAILY
Refills: 0 | Status: DISCONTINUED | OUTPATIENT
Start: 2024-05-24 | End: 2024-05-25

## 2024-05-09 RX ORDER — PANTOPRAZOLE SODIUM 20 MG/1
1 TABLET, DELAYED RELEASE ORAL
Qty: 0 | Refills: 0 | DISCHARGE

## 2024-05-09 RX ORDER — SODIUM CHLORIDE 9 MG/ML
1000 INJECTION, SOLUTION INTRAVENOUS
Refills: 0 | Status: DISCONTINUED | OUTPATIENT
Start: 2024-05-24 | End: 2024-05-24

## 2024-05-09 NOTE — H&P PST ADULT - NSICDXPASTMEDICALHX_GEN_ALL_CORE_FT
PAST MEDICAL HISTORY:  Anemia     Antalgic gait     Arthritis     Chronic GERD     Dislocated shoulder     DM (diabetes mellitus)     History of blood transfusion     History of herniated intervertebral disc     Internal hemorrhoid     Leiomyosarcoma of uterus

## 2024-05-09 NOTE — H&P PST ADULT - MUSCULOSKELETAL
details… uses cane/ROM intact/abnormal gait uses cane/ROM intact/strength 5/5 bilateral upper extremities/abnormal gait

## 2024-05-09 NOTE — H&P PST ADULT - PROBLEM SELECTOR PLAN 1
Patient scheduled for surgery on: 5/17/24  Provided with verbal and written presurgical instructions  Verbalized understanding  with teach back on the following: personal pantoprazole for GI prophylaxis and chlorhexidine wash    Lab specimen drawn at PST today: cbc, bmp, hga1c, type, ua, urine cx

## 2024-05-09 NOTE — H&P PST ADULT - NEGATIVE NEUROLOGICAL SYMPTOMS
no transient paralysis/no generalized seizures/no focal seizures/no headache/no loss of consciousness/no hemiparesis

## 2024-05-09 NOTE — H&P PST ADULT - NEUROLOGICAL COMMENTS
Hx of fall 12/2023, injuring right shoulder, uses cane for gait stability, antalgic gait, states fall occurred after tripping at night outdoors

## 2024-05-09 NOTE — H&P PST ADULT - PROBLEM SELECTOR PLAN 2
Patient instructed on the following medications adjustments: Hold farxiga after 5/13/24 dose and glimepiride and metformin after 5/16/24 p.m. dose   POCT glucose testing ordered STAT upon admission

## 2024-05-09 NOTE — H&P PST ADULT - NEGATIVE GENERAL GENITOURINARY SYMPTOMS
dressing can be removed after 72 hours , remove steristrips.
no hematuria/no flank pain L/no flank pain R

## 2024-05-09 NOTE — H&P PST ADULT - HISTORY OF PRESENT ILLNESS
73 yr old female presents with history of anemia and fibroids scheduled for robotic assisted total laparoscopic hysterectomy bilateral salpingooophorectomy and cystoscopy  73 yr old female presents with history of anemia and fibroids scheduled for robotic assisted total laparoscopic hysterectomy bilateral salpingooophorectomy and cystoscopy

## 2024-05-09 NOTE — H&P PST ADULT - NSANTHOSAYNRD_GEN_A_CORE
No. MITCHEL screening performed.  STOP BANG Legend: 0-2 = LOW Risk; 3-4 = INTERMEDIATE Risk; 5-8 = HIGH Risk

## 2024-05-09 NOTE — H&P PST ADULT - CARDIOVASCULAR
regular rate and rhythm/S1 S2 present/no gallops/no rub/no murmur/peripheral edema details… regular rate and rhythm/S1 S2 present/no gallops/no rub/no murmur/peripheral edema/pedal edema

## 2024-05-10 LAB
CULTURE RESULTS: SIGNIFICANT CHANGE UP
SPECIMEN SOURCE: SIGNIFICANT CHANGE UP

## 2024-05-14 PROBLEM — M19.90 UNSPECIFIED OSTEOARTHRITIS, UNSPECIFIED SITE: Chronic | Status: ACTIVE | Noted: 2024-05-09

## 2024-05-14 PROBLEM — D64.9 ANEMIA, UNSPECIFIED: Chronic | Status: ACTIVE | Noted: 2024-05-09

## 2024-05-14 PROBLEM — C55 MALIGNANT NEOPLASM OF UTERUS, PART UNSPECIFIED: Chronic | Status: ACTIVE | Noted: 2024-05-09

## 2024-05-14 PROBLEM — Z92.89 PERSONAL HISTORY OF OTHER MEDICAL TREATMENT: Chronic | Status: ACTIVE | Noted: 2024-05-09

## 2024-05-14 PROBLEM — R26.89 OTHER ABNORMALITIES OF GAIT AND MOBILITY: Chronic | Status: ACTIVE | Noted: 2024-05-09

## 2024-05-14 PROBLEM — S43.006A UNSPECIFIED DISLOCATION OF UNSPECIFIED SHOULDER JOINT, INITIAL ENCOUNTER: Chronic | Status: ACTIVE | Noted: 2024-05-09

## 2024-05-14 PROBLEM — E11.9 TYPE 2 DIABETES MELLITUS WITHOUT COMPLICATIONS: Chronic | Status: ACTIVE | Noted: 2024-05-09

## 2024-05-14 PROBLEM — Z87.39 PERSONAL HISTORY OF OTHER DISEASES OF THE MUSCULOSKELETAL SYSTEM AND CONNECTIVE TISSUE: Chronic | Status: ACTIVE | Noted: 2024-05-09

## 2024-05-15 ENCOUNTER — APPOINTMENT (OUTPATIENT)
Dept: INTERNAL MEDICINE | Facility: CLINIC | Age: 74
End: 2024-05-15
Payer: MEDICARE

## 2024-05-15 ENCOUNTER — NON-APPOINTMENT (OUTPATIENT)
Age: 74
End: 2024-05-15

## 2024-05-15 VITALS
HEART RATE: 85 BPM | OXYGEN SATURATION: 97 % | BODY MASS INDEX: 38.71 KG/M2 | HEIGHT: 61 IN | WEIGHT: 205 LBS | DIASTOLIC BLOOD PRESSURE: 89 MMHG | SYSTOLIC BLOOD PRESSURE: 176 MMHG

## 2024-05-15 DIAGNOSIS — D50.9 IRON DEFICIENCY ANEMIA, UNSPECIFIED: ICD-10-CM

## 2024-05-15 DIAGNOSIS — Z12.39 ENCOUNTER FOR OTHER SCREENING FOR MALIGNANT NEOPLASM OF BREAST: ICD-10-CM

## 2024-05-15 LAB
FERRITIN SERPL-MCNC: 8
HCT VFR BLD CALC: 26.5
HGB BLD-MCNC: 7.7
IRON SERPL-MCNC: 18
MCHC RBC-ENTMCNC: 22.6
MCHC RBC-ENTMCNC: 29.1
MCV RBC AUTO: 77.9
PLATELET # BLD AUTO: 312
RBC # BLD: 3.4
RBC # FLD: 17.9
WBC # FLD AUTO: 4.06

## 2024-05-15 PROCEDURE — 99205 OFFICE O/P NEW HI 60 MIN: CPT

## 2024-05-15 PROCEDURE — 93000 ELECTROCARDIOGRAM COMPLETE: CPT | Mod: 59

## 2024-05-15 PROCEDURE — G2211 COMPLEX E/M VISIT ADD ON: CPT

## 2024-05-15 RX ORDER — METFORMIN HYDROCHLORIDE 1000 MG/1
1000 TABLET, FILM COATED, EXTENDED RELEASE ORAL DAILY
Qty: 90 | Refills: 1 | Status: ACTIVE | COMMUNITY

## 2024-05-15 RX ORDER — GLIMEPIRIDE 2 MG/1
2 TABLET ORAL
Qty: 90 | Refills: 1 | Status: ACTIVE | COMMUNITY

## 2024-05-15 RX ORDER — DAPAGLIFLOZIN 10 MG/1
TABLET, FILM COATED ORAL DAILY
Refills: 0 | Status: ACTIVE | COMMUNITY

## 2024-05-20 ENCOUNTER — APPOINTMENT (OUTPATIENT)
Dept: CARDIOLOGY | Facility: CLINIC | Age: 74
End: 2024-05-20
Payer: MEDICARE

## 2024-05-20 ENCOUNTER — RESULT REVIEW (OUTPATIENT)
Age: 74
End: 2024-05-20

## 2024-05-20 ENCOUNTER — APPOINTMENT (OUTPATIENT)
Dept: CV DIAGNOSITCS | Facility: HOSPITAL | Age: 74
End: 2024-05-20

## 2024-05-20 ENCOUNTER — OUTPATIENT (OUTPATIENT)
Dept: OUTPATIENT SERVICES | Facility: HOSPITAL | Age: 74
LOS: 1 days | End: 2024-05-20
Payer: MEDICARE

## 2024-05-20 VITALS — DIASTOLIC BLOOD PRESSURE: 67 MMHG | SYSTOLIC BLOOD PRESSURE: 127 MMHG

## 2024-05-20 VITALS
DIASTOLIC BLOOD PRESSURE: 73 MMHG | OXYGEN SATURATION: 98 % | SYSTOLIC BLOOD PRESSURE: 148 MMHG | HEART RATE: 82 BPM | WEIGHT: 205 LBS | HEIGHT: 61 IN | BODY MASS INDEX: 38.71 KG/M2

## 2024-05-20 DIAGNOSIS — Z98.890 OTHER SPECIFIED POSTPROCEDURAL STATES: Chronic | ICD-10-CM

## 2024-05-20 DIAGNOSIS — R60.0 LOCALIZED EDEMA: ICD-10-CM

## 2024-05-20 DIAGNOSIS — Z98.49 CATARACT EXTRACTION STATUS, UNSPECIFIED EYE: Chronic | ICD-10-CM

## 2024-05-20 DIAGNOSIS — Z01.818 ENCOUNTER FOR OTHER PREPROCEDURAL EXAMINATION: ICD-10-CM

## 2024-05-20 DIAGNOSIS — I10 ESSENTIAL (PRIMARY) HYPERTENSION: ICD-10-CM

## 2024-05-20 PROCEDURE — 99204 OFFICE O/P NEW MOD 45 MIN: CPT

## 2024-05-20 PROCEDURE — 93000 ELECTROCARDIOGRAM COMPLETE: CPT

## 2024-05-20 PROCEDURE — 93306 TTE W/DOPPLER COMPLETE: CPT | Mod: 26

## 2024-05-20 NOTE — CARDIOLOGY SUMMARY
[de-identified] : EKG: Normal Sinus, normal EKG [de-identified] : CONCLUSIONS:    1. Left ventricular cavity is normal in size. Left ventricular wall thickness is normal. Left ventricular systolic function is normal with an ejection fraction of 65 % by Meneses's method of disks. There are no regional wall motion abnormalities seen.  2. Normal left ventricular diastolic function.  3. Normal right ventricular cavity size and normal systolic function.  4. Structurally normal mitral valve with normal leaflet excursion. There is calcification of the mitral valve annulus. There is mild mitral regurgitation.

## 2024-05-20 NOTE — REVIEW OF SYSTEMS
[Negative] : Heme/Lymph [Lower Ext Edema] : lower extremity edema [SOB] : no shortness of breath [Dyspnea on exertion] : not dyspnea during exertion [Chest Discomfort] : no chest discomfort [Leg Claudication] : no intermittent leg claudication [Palpitations] : no palpitations [Syncope] : no syncope

## 2024-05-20 NOTE — ASSESSMENT
[FreeTextEntry1] : #Pre op evaluation prior to hysterectomy  - At this time the pt does not have any signs of unstable arrhythmia or ACS - She does have lower ext edema that is likely related venous insufficiency in the setting central venous compression due to enlarged uterus. Likely not related to CHF - TTE with normal EF.  - From a cardiac stand point the patient is optimized for GYN surgery.   #DM2 - Check A1C and lipid panel at next visit.  - Continue with current DM medications   #HTN - BP controlled today  - C/ w losartan - HCTZ

## 2024-05-20 NOTE — HISTORY OF PRESENT ILLNESS
[FreeTextEntry1] : Pt is a 73 year old F with a Hx of uterine fibroids, HTN and DM2. She is scheduled to undergo hysterectomy later this month for uterine fibroids, and was advised to see the cardiologist for pre op clearance.   She currently feels well. She denies any SOB, chest pain, dizziness, syncope, or palpitations. She does admit to having lower ext edema that has been present for years. She has tried compression stockings in the past for this. She has had a venous procedure in the past for her legs, but does not recall what it was. She states that the procedure was over 10 years ago.   She is a former smoker. She does not have a strong family history of cardiac disease.

## 2024-05-21 NOTE — HISTORY OF PRESENT ILLNESS
[FreeTextEntry1] : This consult was converted to telephonic due to connectivity issues on the pt's end with video.  This is a 74 y/o postmenopausal female with hx of T2DM, right dislocated shoulder s/p recent fall on ice (PT pending), uterine fibroids with symptomatic bulky urologic s/s (urge incontinence, frequency, nocturia 3-4 x), progressively worsening over the past 2 years, who presents to IR for UAE consultation, as referred by Dr Jones.  Ms Bella states fibroid s/s are compromising her quality of life. Stated last GYN recommended hysterectomy which she has declined - did not wish to provide GYN's name.  Denies nsaids or a/c usage.   Dr Jones GYN Dr - has to establish care with a new GYN  - last GYN recommended hysterectomy which she has declined PCP Dr Jazmin Oneal

## 2024-05-21 NOTE — ASSESSMENT
[Other: _____] : [unfilled] [FreeTextEntry1] : This is a 74 y/o postmenopausal female with hx of T2DM, uterine fibroids with symptomatic bulky urologic s/s (urge incontinence, frequency, nocturia 3-4 x), who presents to IR for UFE consultation.  # Symptomatic Uterine Fibroids - the pt reports urologic symptomatology 2/2 fibroids are compromising her ADLs - states prior GYN MD recommended hysterectomy which she declined - referred by Dr Jones for UFE - 1/19/24 pelvic MRI was reviewed at great length with the pt - full report to follow - uterus is grossly enlarged, compressing the bladder & many fibroids are no longer enhancing - discussed that performing UFE would likely not provide her with bulky s/s relief given overall uterus size & minimal enhancing fibroids - recommend that she f/u with GYN MD to explore alternative tx modalities - provided Dr Angelica Mendoza's contact info per the pt's request  The pt's comprehension was confirmed & all questions were asked & answered to her satisfaction. IR office contact information was provided to the pt should she have any additional questions to address.

## 2024-05-22 ENCOUNTER — TRANSCRIPTION ENCOUNTER (OUTPATIENT)
Age: 74
End: 2024-05-22

## 2024-05-22 NOTE — PHYSICAL EXAM
[No Acute Distress] : no acute distress [Well-Appearing] : well-appearing [Normal Sclera/Conjunctiva] : normal sclera/conjunctiva [Normal Outer Ear/Nose] : the outer ears and nose were normal in appearance [No Lymphadenopathy] : no lymphadenopathy [No Respiratory Distress] : no respiratory distress  [No Accessory Muscle Use] : no accessory muscle use [Clear to Auscultation] : lungs were clear to auscultation bilaterally [Normal Rate] : normal rate  [Regular Rhythm] : with a regular rhythm [Normal S1, S2] : normal S1 and S2 [No Spinal Tenderness] : no spinal tenderness [Grossly Normal Strength/Tone] : grossly normal strength/tone [Normal Affect] : the affect was normal [Alert and Oriented x3] : oriented to person, place, and time [de-identified] : 2+ bilateral pitting edema [de-identified] : ambulates with a cane

## 2024-05-22 NOTE — HISTORY OF PRESENT ILLNESS
[No Pertinent Cardiac History] : no history of aortic stenosis, atrial fibrillation, coronary artery disease, recent myocardial infarction, or implantable device/pacemaker [No Pertinent Pulmonary History] : no history of asthma, COPD, sleep apnea, or smoking [No Adverse Anesthesia Reaction] : no adverse anesthesia reaction in self or family member [Diabetes] : diabetes [(Patient denies any chest pain, claudication, dyspnea on exertion, orthopnea, palpitations or syncope)] : Patient denies any chest pain, claudication, dyspnea on exertion, orthopnea, palpitations or syncope [Good (7-10 METs)] : Good (7-10 METs) [Chronic Anticoagulation] : no chronic anticoagulation [Chronic Kidney Disease] : no chronic kidney disease [FreeTextEntry1] : Hysterectomy [FreeTextEntry2] : 05/21/2024 [FreeTextEntry3] : Dr. Pierce [FreeTextEntry4] : 74 yo F with an enlarged uterus causing compression symptoms involving the bladder.

## 2024-05-22 NOTE — ASSESSMENT
[Patient Requires Further Testing] : Patient requires further testing [Echocardiogram] : echocardiogram [Cardiology consultation] : Cardiology consultation [As per surgery] : as per surgery [Patient Optimized for Surgery] : Patient optimized for surgery [No Further Testing Recommended] : no further testing recommended [FreeTextEntry4] : VS showing chronic HTN- will start losartan-HCTZ. Pre-op labs, EKG & TTE reviewed. At this time patient is clinically stable and optimized for planned procedure.

## 2024-05-23 ENCOUNTER — TRANSCRIPTION ENCOUNTER (OUTPATIENT)
Age: 74
End: 2024-05-23

## 2024-05-23 NOTE — ASU PATIENT PROFILE, ADULT - FALL HARM RISK - RISK INTERVENTIONS

## 2024-05-24 ENCOUNTER — APPOINTMENT (OUTPATIENT)
Dept: OBGYN | Facility: HOSPITAL | Age: 74
End: 2024-05-24

## 2024-05-24 ENCOUNTER — INPATIENT (INPATIENT)
Facility: HOSPITAL | Age: 74
LOS: 0 days | Discharge: HOME | End: 2024-05-25
Attending: OBSTETRICS & GYNECOLOGY | Admitting: OBSTETRICS & GYNECOLOGY
Payer: MEDICARE

## 2024-05-24 ENCOUNTER — TRANSCRIPTION ENCOUNTER (OUTPATIENT)
Age: 74
End: 2024-05-24

## 2024-05-24 ENCOUNTER — RESULT REVIEW (OUTPATIENT)
Age: 74
End: 2024-05-24

## 2024-05-24 VITALS
RESPIRATION RATE: 16 BRPM | OXYGEN SATURATION: 98 % | DIASTOLIC BLOOD PRESSURE: 63 MMHG | HEIGHT: 60 IN | SYSTOLIC BLOOD PRESSURE: 143 MMHG | TEMPERATURE: 98 F | HEART RATE: 82 BPM | WEIGHT: 199.96 LBS

## 2024-05-24 DIAGNOSIS — Z98.890 OTHER SPECIFIED POSTPROCEDURAL STATES: Chronic | ICD-10-CM

## 2024-05-24 DIAGNOSIS — D25.9 LEIOMYOMA OF UTERUS, UNSPECIFIED: ICD-10-CM

## 2024-05-24 DIAGNOSIS — Z98.49 CATARACT EXTRACTION STATUS, UNSPECIFIED EYE: Chronic | ICD-10-CM

## 2024-05-24 LAB
BASOPHILS # BLD AUTO: 0.01 K/UL — SIGNIFICANT CHANGE UP (ref 0–0.2)
BASOPHILS NFR BLD AUTO: 0.1 % — SIGNIFICANT CHANGE UP (ref 0–2)
BLOOD GAS ARTERIAL - LYTES,HGB,ICA,LACT RESULT: SIGNIFICANT CHANGE UP
BLOOD GAS ARTERIAL - LYTES,HGB,ICA,LACT RESULT: SIGNIFICANT CHANGE UP
EOSINOPHIL # BLD AUTO: 0 K/UL — SIGNIFICANT CHANGE UP (ref 0–0.5)
EOSINOPHIL NFR BLD AUTO: 0 % — SIGNIFICANT CHANGE UP (ref 0–6)
GLUCOSE BLDC GLUCOMTR-MCNC: 101 MG/DL — HIGH (ref 70–99)
GLUCOSE BLDC GLUCOMTR-MCNC: 136 MG/DL — HIGH (ref 70–99)
HCT VFR BLD CALC: 24.6 % — LOW (ref 34.5–45)
HGB BLD-MCNC: 7.5 G/DL — LOW (ref 11.5–15.5)
IANC: 9.55 K/UL — HIGH (ref 1.8–7.4)
IMM GRANULOCYTES NFR BLD AUTO: 0.4 % — SIGNIFICANT CHANGE UP (ref 0–0.9)
LYMPHOCYTES # BLD AUTO: 0.7 K/UL — LOW (ref 1–3.3)
LYMPHOCYTES # BLD AUTO: 6.7 % — LOW (ref 13–44)
MCHC RBC-ENTMCNC: 24 PG — LOW (ref 27–34)
MCHC RBC-ENTMCNC: 30.5 GM/DL — LOW (ref 32–36)
MCV RBC AUTO: 78.8 FL — LOW (ref 80–100)
MONOCYTES # BLD AUTO: 0.17 K/UL — SIGNIFICANT CHANGE UP (ref 0–0.9)
MONOCYTES NFR BLD AUTO: 1.6 % — LOW (ref 2–14)
NEUTROPHILS # BLD AUTO: 9.55 K/UL — HIGH (ref 1.8–7.4)
NEUTROPHILS NFR BLD AUTO: 91.2 % — HIGH (ref 43–77)
NRBC # BLD: 0 /100 WBCS — SIGNIFICANT CHANGE UP (ref 0–0)
NRBC # FLD: 0 K/UL — SIGNIFICANT CHANGE UP (ref 0–0)
PLATELET # BLD AUTO: 268 K/UL — SIGNIFICANT CHANGE UP (ref 150–400)
RBC # BLD: 3.12 M/UL — LOW (ref 3.8–5.2)
RBC # FLD: 22.1 % — HIGH (ref 10.3–14.5)
WBC # BLD: 10.47 K/UL — SIGNIFICANT CHANGE UP (ref 3.8–10.5)
WBC # FLD AUTO: 10.47 K/UL — SIGNIFICANT CHANGE UP (ref 3.8–10.5)

## 2024-05-24 PROCEDURE — 88309 TISSUE EXAM BY PATHOLOGIST: CPT | Mod: 26

## 2024-05-24 PROCEDURE — 88342 IMHCHEM/IMCYTCHM 1ST ANTB: CPT | Mod: 26

## 2024-05-24 PROCEDURE — 88311 DECALCIFY TISSUE: CPT | Mod: 26

## 2024-05-24 PROCEDURE — 88341 IMHCHEM/IMCYTCHM EA ADD ANTB: CPT | Mod: 26

## 2024-05-24 RX ORDER — SODIUM CHLORIDE 9 MG/ML
1000 INJECTION, SOLUTION INTRAVENOUS
Refills: 0 | Status: DISCONTINUED | OUTPATIENT
Start: 2024-05-24 | End: 2024-05-25

## 2024-05-24 RX ORDER — METFORMIN HYDROCHLORIDE 850 MG/1
2 TABLET ORAL
Refills: 0 | DISCHARGE

## 2024-05-24 RX ORDER — SENNA PLUS 8.6 MG/1
2 TABLET ORAL AT BEDTIME
Refills: 0 | Status: DISCONTINUED | OUTPATIENT
Start: 2024-05-24 | End: 2024-05-25

## 2024-05-24 RX ORDER — DAPAGLIFLOZIN 10 MG/1
1 TABLET, FILM COATED ORAL
Qty: 0 | Refills: 0 | DISCHARGE

## 2024-05-24 RX ORDER — DEXTROSE 50 % IN WATER 50 %
15 SYRINGE (ML) INTRAVENOUS ONCE
Refills: 0 | Status: DISCONTINUED | OUTPATIENT
Start: 2024-05-24 | End: 2024-05-25

## 2024-05-24 RX ORDER — INSULIN LISPRO 100/ML
VIAL (ML) SUBCUTANEOUS
Refills: 0 | Status: DISCONTINUED | OUTPATIENT
Start: 2024-05-24 | End: 2024-05-25

## 2024-05-24 RX ORDER — INSULIN LISPRO 100/ML
VIAL (ML) SUBCUTANEOUS AT BEDTIME
Refills: 0 | Status: DISCONTINUED | OUTPATIENT
Start: 2024-05-24 | End: 2024-05-25

## 2024-05-24 RX ORDER — OXYCODONE HYDROCHLORIDE 5 MG/1
5 TABLET ORAL EVERY 6 HOURS
Refills: 0 | Status: DISCONTINUED | OUTPATIENT
Start: 2024-05-24 | End: 2024-05-25

## 2024-05-24 RX ORDER — SODIUM CHLORIDE 9 MG/ML
1000 INJECTION, SOLUTION INTRAVENOUS
Refills: 0 | Status: DISCONTINUED | OUTPATIENT
Start: 2024-05-24 | End: 2024-05-24

## 2024-05-24 RX ORDER — DEXTROSE 10 % IN WATER 10 %
125 INTRAVENOUS SOLUTION INTRAVENOUS ONCE
Refills: 0 | Status: DISCONTINUED | OUTPATIENT
Start: 2024-05-24 | End: 2024-05-25

## 2024-05-24 RX ORDER — GLIMEPIRIDE 1 MG
1 TABLET ORAL
Refills: 0 | DISCHARGE

## 2024-05-24 RX ORDER — PANTOPRAZOLE SODIUM 20 MG/1
1 TABLET, DELAYED RELEASE ORAL
Refills: 0 | DISCHARGE

## 2024-05-24 RX ORDER — ONDANSETRON 8 MG/1
4 TABLET, FILM COATED ORAL EVERY 6 HOURS
Refills: 0 | Status: DISCONTINUED | OUTPATIENT
Start: 2024-05-24 | End: 2024-05-25

## 2024-05-24 RX ORDER — METFORMIN HYDROCHLORIDE 850 MG/1
1000 TABLET ORAL
Refills: 0 | Status: DISCONTINUED | OUTPATIENT
Start: 2024-05-24 | End: 2024-05-25

## 2024-05-24 RX ORDER — OXYCODONE HYDROCHLORIDE 5 MG/1
1 TABLET ORAL
Qty: 9 | Refills: 0
Start: 2024-05-24

## 2024-05-24 RX ORDER — DAPAGLIFLOZIN 10 MG/1
10 TABLET, FILM COATED ORAL DAILY
Refills: 0 | Status: DISCONTINUED | OUTPATIENT
Start: 2024-05-24 | End: 2024-05-25

## 2024-05-24 RX ORDER — SIMETHICONE 80 MG/1
80 TABLET, CHEWABLE ORAL
Refills: 0 | Status: DISCONTINUED | OUTPATIENT
Start: 2024-05-24 | End: 2024-05-25

## 2024-05-24 RX ORDER — KETOROLAC TROMETHAMINE 30 MG/ML
30 SYRINGE (ML) INJECTION EVERY 6 HOURS
Refills: 0 | Status: DISCONTINUED | OUTPATIENT
Start: 2024-05-24 | End: 2024-05-25

## 2024-05-24 RX ORDER — DEXTROSE 50 % IN WATER 50 %
12.5 SYRINGE (ML) INTRAVENOUS ONCE
Refills: 0 | Status: DISCONTINUED | OUTPATIENT
Start: 2024-05-24 | End: 2024-05-25

## 2024-05-24 RX ORDER — DEXTROSE 50 % IN WATER 50 %
25 SYRINGE (ML) INTRAVENOUS ONCE
Refills: 0 | Status: DISCONTINUED | OUTPATIENT
Start: 2024-05-24 | End: 2024-05-25

## 2024-05-24 RX ORDER — HEPARIN SODIUM 5000 [USP'U]/ML
5000 INJECTION INTRAVENOUS; SUBCUTANEOUS EVERY 12 HOURS
Refills: 0 | Status: DISCONTINUED | OUTPATIENT
Start: 2024-05-24 | End: 2024-05-25

## 2024-05-24 RX ORDER — GLUCAGON INJECTION, SOLUTION 0.5 MG/.1ML
1 INJECTION, SOLUTION SUBCUTANEOUS ONCE
Refills: 0 | Status: DISCONTINUED | OUTPATIENT
Start: 2024-05-24 | End: 2024-05-25

## 2024-05-24 RX ORDER — ACETAMINOPHEN 500 MG
975 TABLET ORAL EVERY 6 HOURS
Refills: 0 | Status: DISCONTINUED | OUTPATIENT
Start: 2024-05-24 | End: 2024-05-25

## 2024-05-24 RX ORDER — FAMOTIDINE 10 MG/ML
20 INJECTION INTRAVENOUS EVERY 12 HOURS
Refills: 0 | Status: DISCONTINUED | OUTPATIENT
Start: 2024-05-24 | End: 2024-05-25

## 2024-05-24 RX ORDER — GLIMEPIRIDE 1 MG
2 TABLET ORAL
Refills: 0 | DISCHARGE

## 2024-05-24 RX ADMIN — SODIUM CHLORIDE 125 MILLILITER(S): 9 INJECTION, SOLUTION INTRAVENOUS at 22:42

## 2024-05-24 RX ADMIN — SODIUM CHLORIDE 3 MILLILITER(S): 9 INJECTION INTRAMUSCULAR; INTRAVENOUS; SUBCUTANEOUS at 22:39

## 2024-05-24 NOTE — DISCHARGE NOTE PROVIDER - NSDCCPTREATMENT_GEN_ALL_CORE_FT
PRINCIPAL PROCEDURE  Procedure: Laparoscopic total hysterectomy with cystoscopy  Findings and Treatment: With bilateral salpingectomy and oophorectomy

## 2024-05-24 NOTE — BRIEF OPERATIVE NOTE - NSICDXBRIEFPROCEDURE_GEN_ALL_CORE_FT
PROCEDURES:  Laparoscopic total hysterectomy with bilateral salpingo-oophorectomy (BSO) for uterus greater than 250 grams 24-May-2024 22:19:45  Adriana Mercado  Cystoscopy 24-May-2024 22:20:14  Adriana Mercado  Repair, laceration, labia or vagina 24-May-2024 22:21:36  Adriana Mercado  Laparoscopy with minilaparotomy 24-May-2024 22:33:01  Adriana Mercado

## 2024-05-24 NOTE — DISCHARGE NOTE PROVIDER - NSDCFUADDINST_GEN_ALL_CORE_FT
Postoperative Instructions   Pain control    For pain control, take the followin. Motrin 600mg every 6 hours with food  2. Also take Tylenol 975mg every 6 hours. Alternate Motrin and Tylenol every 3 hours.  3. Add oxycodone as needed for severe pain not managed well by Motrin and Tylenol. A prescription has been sent to your pharmacy on file.     Motrin and Tylenol can be obtained over the counter.    Incision Care  Keep your incision(s) clean and dry. It is ok to shower, but do not scrub the incision sites--just allow the water to gently wash over your skin. Remove the outer dressing(s)--the band-aids--the second day after your surgery. There are small pieces of tape called steri-strips over the incisions underneath these dressings. Steri strips will be removed at your postoperative appointment.    Postoperative restrictions   Do not drive or make important decisions for 24 hours after anesthesia. You may feel drowsy for 24 hours and should have a responsible adult with you during that time. Nothing in the vagina (tampons, sexual intercourse), No tub baths, pools or hot tubs for 8 weeks (showers are ok!). No lifting anything heavier than 15 lbs, no strenuous exercise for 8 weeks after surgery. Do not pull or cut any stitches that you see around your incision.    Vaginal bleeding   Spotting and intermittent passage of blood clots per vagina is normal in first few weeks after surgery. If you are soaking 1 pad per hour, that is not normal and you should notify Dr. Pierce's office and seek medical attention right away.    Vaginal discharge   Vaginal discharge (all colors) is normal after vaginal surgery. If you’ve had vaginal surgery, you have sutures in your vagina which take 3 months to fully absorb. You may have vaginal discharge during this time. This is normal.    Signs of Infection  Some postoperative pain and discomfort is normal. However, if you experience any of the following, you may be developing an infection and should be seen by your doctor: pain that does not get better with the oral medications listed above, fever greater than 100.4F, foul smelling discharge coming from the surgical site, nausea and vomiting that does not stop (especially if you are unable to tolerate oral intake), or inability to urinate. If you experience any of these symptoms, call your doctor's office to be seen right away.    Follow Up  Attend your postoperative appointment with Dr. Pierce on  at 1030AM

## 2024-05-24 NOTE — DISCHARGE NOTE PROVIDER - HOSPITAL COURSE
Patient underwent an uncomplicated total laparoscopic hysterectomy, bilateral salpingectomy and oophorectomy, and diagnostic cystoscopy. .  Patient was kept overnight due to low starting Hgb 7.8 and need for minilap to extract large fibroids.  The patient's postoperative course was unremarkable and she remained hemodynamically stable and afebrile throughout.  Upon discharge on POD#1, the patient is ambulating and voiding spontaneously, tolerating oral intake, pain was well controlled with oral medication, and vital signs were stable.

## 2024-05-24 NOTE — DISCHARGE NOTE PROVIDER - CARE PROVIDER_API CALL
Tucker Pierce  Obstetrics and Gynecology  [address] [place] [address]  Phone: [phone]  Fax: [phone]  Scheduled Appointment: 06/13/2024 10:30 AM

## 2024-05-24 NOTE — DISCHARGE NOTE PROVIDER - NSDCFUSCHEDAPPT_GEN_ALL_CORE_FT
Tucker Pierce  [facility]  [facility] [address] [facility] [address] [facility]  Scheduled Appointment: 06/13/2024    Henrietta Ramirez  [facility] Physician [facility]  [facility] [id number]  Scheduled Appointment: 06/19/2024    Tucker Pierce  [facility]  [facility] [address]  Scheduled Appointment: 07/15/2024

## 2024-05-24 NOTE — DISCHARGE NOTE PROVIDER - NSDCMRMEDTOKEN_GEN_ALL_CORE_FT
Farxiga 10 mg oral tablet: 1 tab(s) orally once a day  glimepiride 1 mg oral tablet: 1 tab(s) orally once a day (at bedtime)  glimepiride 1 mg oral tablet: 2 tab(s) orally once a day a.m. dose  metFORMIN 500 mg oral tablet: 2 tab(s) orally 2 times a day  Motrin 600 mg oral tablet: 1 tab(s) orally every 6 hours  oxyCODONE 5 mg oral tablet: 1 tab(s) orally every 6 hours as needed for  severe pain MDD: 4  pantoprazole 20 mg oral delayed release tablet: 1 tab(s) orally once a day  Tylenol 325 mg oral tablet: 3 tab(s) orally every 6 hours

## 2024-05-24 NOTE — BRIEF OPERATIVE NOTE - OPERATION/FINDINGS
EUA revealed enlarged ~22wk sized mobile fibroid uterus. No adnexal masses palpated BL. Postmenopausal cervical atrophy noted. Grossly normal external genitalia.   Lsc findings revealed fibroid uterus w/ fundal fibroid extending cephalad to umbilicus. Thin filmy adhesions from LAY to anterior abdominal wall. Grossly normal BL ovaries & BL fallopian tubes.   Cystoscopy revealed in tact bladder mucosa, no evidence of suture material, brisk BL ureteral jets.  Post-procedural vaginal exam with right sulcal laceration noted, repaired w/ 2-0 vicryl suture.

## 2024-05-25 ENCOUNTER — TRANSCRIPTION ENCOUNTER (OUTPATIENT)
Age: 74
End: 2024-05-25

## 2024-05-25 VITALS
SYSTOLIC BLOOD PRESSURE: 127 MMHG | TEMPERATURE: 97 F | DIASTOLIC BLOOD PRESSURE: 49 MMHG | OXYGEN SATURATION: 100 % | RESPIRATION RATE: 18 BRPM | HEART RATE: 87 BPM

## 2024-05-25 LAB
GLUCOSE BLDC GLUCOMTR-MCNC: 144 MG/DL — HIGH (ref 70–99)
GLUCOSE BLDC GLUCOMTR-MCNC: 165 MG/DL — HIGH (ref 70–99)
GLUCOSE BLDC GLUCOMTR-MCNC: 169 MG/DL — HIGH (ref 70–99)
GLUCOSE BLDC GLUCOMTR-MCNC: 176 MG/DL — HIGH (ref 70–99)
GLUCOSE BLDC GLUCOMTR-MCNC: 189 MG/DL — HIGH (ref 70–99)
HCT VFR BLD CALC: 24.1 % — LOW (ref 34.5–45)
HGB BLD-MCNC: 7.3 G/DL — LOW (ref 11.5–15.5)
MCHC RBC-ENTMCNC: 24.2 PG — LOW (ref 27–34)
MCHC RBC-ENTMCNC: 30.3 GM/DL — LOW (ref 32–36)
MCV RBC AUTO: 79.8 FL — LOW (ref 80–100)
NRBC # BLD: 0 /100 WBCS — SIGNIFICANT CHANGE UP (ref 0–0)
NRBC # FLD: 0 K/UL — SIGNIFICANT CHANGE UP (ref 0–0)
PLATELET # BLD AUTO: 256 K/UL — SIGNIFICANT CHANGE UP (ref 150–400)
RBC # BLD: 3.02 M/UL — LOW (ref 3.8–5.2)
RBC # FLD: 21.8 % — HIGH (ref 10.3–14.5)
WBC # BLD: 8.61 K/UL — SIGNIFICANT CHANGE UP (ref 3.8–10.5)
WBC # FLD AUTO: 8.61 K/UL — SIGNIFICANT CHANGE UP (ref 3.8–10.5)

## 2024-05-25 PROCEDURE — 93970 EXTREMITY STUDY: CPT | Mod: 26

## 2024-05-25 RX ORDER — IBUPROFEN 200 MG
600 TABLET ORAL EVERY 6 HOURS
Refills: 0 | Status: DISCONTINUED | OUTPATIENT
Start: 2024-05-25 | End: 2024-05-25

## 2024-05-25 RX ORDER — ACETAMINOPHEN 500 MG
3 TABLET ORAL
Qty: 0 | Refills: 0 | DISCHARGE

## 2024-05-25 RX ORDER — IBUPROFEN 200 MG
1 TABLET ORAL
Qty: 0 | Refills: 0 | DISCHARGE

## 2024-05-25 RX ADMIN — METFORMIN HYDROCHLORIDE 1000 MILLIGRAM(S): 850 TABLET ORAL at 17:19

## 2024-05-25 RX ADMIN — SIMETHICONE 80 MILLIGRAM(S): 80 TABLET, CHEWABLE ORAL at 05:36

## 2024-05-25 RX ADMIN — Medication 975 MILLIGRAM(S): at 01:52

## 2024-05-25 RX ADMIN — SODIUM CHLORIDE 3 MILLILITER(S): 9 INJECTION INTRAMUSCULAR; INTRAVENOUS; SUBCUTANEOUS at 14:20

## 2024-05-25 RX ADMIN — Medication 600 MILLIGRAM(S): at 19:57

## 2024-05-25 RX ADMIN — Medication 975 MILLIGRAM(S): at 09:34

## 2024-05-25 RX ADMIN — METFORMIN HYDROCHLORIDE 1000 MILLIGRAM(S): 850 TABLET ORAL at 05:37

## 2024-05-25 RX ADMIN — Medication 30 MILLIGRAM(S): at 03:50

## 2024-05-25 RX ADMIN — Medication 975 MILLIGRAM(S): at 02:35

## 2024-05-25 RX ADMIN — FAMOTIDINE 20 MILLIGRAM(S): 10 INJECTION INTRAVENOUS at 17:15

## 2024-05-25 RX ADMIN — SIMETHICONE 80 MILLIGRAM(S): 80 TABLET, CHEWABLE ORAL at 11:44

## 2024-05-25 RX ADMIN — Medication 975 MILLIGRAM(S): at 10:34

## 2024-05-25 RX ADMIN — HEPARIN SODIUM 5000 UNIT(S): 5000 INJECTION INTRAVENOUS; SUBCUTANEOUS at 05:37

## 2024-05-25 RX ADMIN — SODIUM CHLORIDE 3 MILLILITER(S): 9 INJECTION INTRAMUSCULAR; INTRAVENOUS; SUBCUTANEOUS at 05:36

## 2024-05-25 RX ADMIN — Medication 975 MILLIGRAM(S): at 19:57

## 2024-05-25 RX ADMIN — Medication 30 MILLIGRAM(S): at 00:42

## 2024-05-25 RX ADMIN — Medication 1: at 11:41

## 2024-05-25 RX ADMIN — Medication 600 MILLIGRAM(S): at 12:53

## 2024-05-25 RX ADMIN — Medication 1: at 17:09

## 2024-05-25 RX ADMIN — HEPARIN SODIUM 5000 UNIT(S): 5000 INJECTION INTRAVENOUS; SUBCUTANEOUS at 17:15

## 2024-05-25 RX ADMIN — SIMETHICONE 80 MILLIGRAM(S): 80 TABLET, CHEWABLE ORAL at 01:08

## 2024-05-25 RX ADMIN — SIMETHICONE 80 MILLIGRAM(S): 80 TABLET, CHEWABLE ORAL at 17:20

## 2024-05-25 RX ADMIN — Medication 975 MILLIGRAM(S): at 16:22

## 2024-05-25 RX ADMIN — FAMOTIDINE 20 MILLIGRAM(S): 10 INJECTION INTRAVENOUS at 05:37

## 2024-05-25 RX ADMIN — DAPAGLIFLOZIN 10 MILLIGRAM(S): 10 TABLET, FILM COATED ORAL at 11:44

## 2024-05-25 RX ADMIN — SODIUM CHLORIDE 125 MILLILITER(S): 9 INJECTION, SOLUTION INTRAVENOUS at 21:30

## 2024-05-25 NOTE — PATIENT PROFILE ADULT - NSTOBACCONEVERSMOKERY/N_GEN_A
Patient is here with parent/guardian for a yearly physical exam. The patient is feeling well and no complaints per patient and/or parent or guardian.       Dizziness/chest pain/SOB or excessive fatigue with exercise: No  Unexplained fainting or near-faintin distress  Head: normocephalic, atraumatic  Eyes: HEIKE, EOMI, cornea and conjunctiva clear  Ears:  tympanic membranes intact bilaterally with out reddening or retraction, external canals appear normal  Nose: pink nasal mucosa without discharge, nares paten Yes

## 2024-05-25 NOTE — PROGRESS NOTE ADULT - SUBJECTIVE AND OBJECTIVE BOX
Subjective:   Pt seen and examined at bedside. No events overnight. Pain well controlled. Patient ambulating. Passing flatus. Tolerating regular diet. Pt denies fever, chills, chest pain, SOB, nausea, vomiting, lightheadedness, dizziness. Complaining od L sided calf pain.     Objective:    MEDICATIONS  (STANDING):  acetaminophen     Tablet .. 975 milliGRAM(s) Oral every 6 hours  chlorhexidine 2% Cloths 1 Application(s) Topical daily  dapagliflozin 10 milliGRAM(s) Oral daily  dextrose 10% Bolus 125 milliLiter(s) IV Bolus once  dextrose 5%. 1000 milliLiter(s) (50 mL/Hr) IV Continuous <Continuous>  dextrose 5%. 1000 milliLiter(s) (100 mL/Hr) IV Continuous <Continuous>  dextrose 50% Injectable 25 Gram(s) IV Push once  dextrose 50% Injectable 12.5 Gram(s) IV Push once  famotidine Injectable 20 milliGRAM(s) IV Push every 12 hours  glucagon  Injectable 1 milliGRAM(s) IntraMuscular once  heparin   Injectable 5000 Unit(s) SubCutaneous every 12 hours  ibuprofen  Tablet. 600 milliGRAM(s) Oral every 6 hours  insulin lispro (ADMELOG) corrective regimen sliding scale   SubCutaneous three times a day before meals  insulin lispro (ADMELOG) corrective regimen sliding scale   SubCutaneous at bedtime  lactated ringers. 1000 milliLiter(s) (125 mL/Hr) IV Continuous <Continuous>  metFORMIN 1000 milliGRAM(s) Oral two times a day  senna 2 Tablet(s) Oral at bedtime  simethicone 80 milliGRAM(s) Chew four times a day  sodium chloride 0.9% lock flush 3 milliLiter(s) IV Push every 8 hours    MEDICATIONS  (PRN):  dextrose Oral Gel 15 Gram(s) Oral once PRN Blood Glucose LESS THAN 70 milliGRAM(s)/deciliter  ondansetron Injectable 4 milliGRAM(s) IV Push every 6 hours PRN Nausea and/or Vomiting  oxyCODONE    IR 5 milliGRAM(s) Oral every 6 hours PRN Severe Pain (7 - 10)      T(F): 99.1 (05-25-24 @ 05:45), Max: 99.4 (05-25-24 @ 01:00)  HR: 88 (05-25-24 @ 05:45) (82 - 94)  BP: 130/61 (05-25-24 @ 05:45) (130/61 - 184/77)  RR: 15 (05-25-24 @ 05:45) (14 - 21)  SpO2: 95% (05-25-24 @ 05:45) (93% - 100%)  Wt(kg): --    Physical Exam:  Constitutional: NAD, A+O x3  CV: RRR  Lungs: Clear to auscultation bilaterally  Abdomen: Soft, nondistended, no guarding or rebound tenderness.   Incision: mini lap incision at the Pfannenstiel level clean, dry, intact  : No bleeding on pad  Extremities: L side ankle and calf pain, no BL erythema or induration     LABS:                CAPILLARY BLOOD GLUCOSE      POCT Blood Glucose.: 144 mg/dL (25 May 2024 07:26)  POCT Blood Glucose.: 165 mg/dL (25 May 2024 03:13)  POCT Blood Glucose.: 189 mg/dL (25 May 2024 01:42)  POCT Blood Glucose.: 136 mg/dL (24 May 2024 22:14)  POCT Blood Glucose.: 101 mg/dL (24 May 2024 14:27)      I&O's Summary    24 May 2024 07:01  -  25 May 2024 07:00  --------------------------------------------------------  IN: 805 mL / OUT: 760 mL / NET: 45 mL

## 2024-05-25 NOTE — DISCHARGE NOTE NURSING/CASE MANAGEMENT/SOCIAL WORK - PATIENT PORTAL LINK FT
You can access the FollowMyHealth Patient Portal offered by [facility] by registering at the following website: [email or web address]. By joining [facility]’s FollowMyHealth portal, you will also be able to view your health information using other applications (apps) compatible with our system.

## 2024-05-25 NOTE — DISCHARGE NOTE NURSING/CASE MANAGEMENT/SOCIAL WORK - NSDCPNINST_GEN_ALL_CORE
Please follow up with Dr. Pierce at your scheduled appointment on 6/13/24 at 10:30 AM. Continue to take Motrin 600mg every 6 hours for pain and Tylenol 975mg every 6 hours for pain. Alternate medications every 3 hours. It is OK to shower. Do not scrub or rub at your incisions. The white strips on your incisions will be removed at your post-op appointment. Nothing per the vagina including tampons for the next 8 weeks. No tub baths or pools. Some vaginal bleeding and/ or spotting is normal however, if you are saturating a pad every hour, please notify the office right away. Please call the office if you notice any excessive bleeding from your incision sites or pus draining from your incisions, if you have a fever greater than 100.4, if you have pain that is unrelieved by the pain medication recommended, if you have nausea or vomiting, or if you have trouble urinating. No strenuous activities or exercise. Do not lift anything greater than 15 pounds.

## 2024-05-25 NOTE — PATIENT PROFILE ADULT - PATIENT'S SEXUAL ORIENTATION
[facility]    Endocrine Progress Note  [facility]    [place] Patient Status:  Inpatient    1979 MRN [id number]   Location [facility] 4W/SW/SE Attending Misael Villasenor MD   [facility] Day # 7 PCP Zarina Coleman, 300 mg, 300 mg, Oral, Daily  •  ondansetron HCl (ZOFRAN) injection 4 mg, 4 mg, Intravenous, TID AC and HS  •  HYDROcodone-acetaminophen (NORCO)  MG per tab 1 tablet, 1 tablet, Oral, Q4H PRN  •  metoprolol Tartrate (LOPRESSOR) tab 12.5 mg, 12.5 mg, Or Heterosexual

## 2024-05-25 NOTE — DISCHARGE NOTE NURSING/CASE MANAGEMENT/SOCIAL WORK - NSDCPEFALRISK_GEN_ALL_CORE
For information on Fall & Injury Prevention, visit: [email or web address] OR  [email or web address] OR  https://www.cdc.gov/steadi/patient.html

## 2024-05-25 NOTE — PROGRESS NOTE ADULT - ASSESSMENT
Assessment/Plan: 73y female POD#1 , s/p TLH, BSO, Cysto, via minilap incision.    Neuro:  Continue oral meds for pain control.  CV: Hemodynamically stable  -f/u AM Cbc  Pulm: Saturating well on room air. Encourage ambulation.   GI: Continue regular diet.   : Voding spontaneously.   Heme: Continue HSQ for DVT ppx. Increase OOB.    Patient with LE calf pain, ordered for venous doppler scan of LE  FEN: SLIV  ID: Afebrile  Endo: No active issues  Dispo: Continue post op care, f/u Le dopplers    Frederic Khanna PGY 2

## 2024-05-25 NOTE — PATIENT PROFILE ADULT - FALL HARM RISK - HARM RISK INTERVENTIONS
Assistance with ambulation/Assistance OOB with selected safe patient handling equipment/Communicate Risk of Fall with Harm to all staff/Discuss with provider need for PT consult/Monitor gait and stability/Provide patient with walking aids - walker, cane, crutches/Reinforce activity limits and safety measures with patient and family/Sit up slowly, dangle for a short time, stand at bedside before walking/Tailored Fall Risk Interventions/Use of alarms - bed, chair and/or voice tab/Visual Cue: Yellow wristband and red socks/Bed in lowest position, wheels locked, appropriate side rails in place/Call bell, personal items and telephone in reach/Instruct patient to call for assistance before getting out of bed or chair/Non-slip footwear when patient is out of bed/[place] to call system/Physically safe environment - no spills, clutter or unnecessary equipment/Purposeful Proactive Rounding/Room/bathroom lighting operational, light cord in reach

## 2024-06-13 ENCOUNTER — APPOINTMENT (OUTPATIENT)
Dept: OBGYN | Facility: CLINIC | Age: 74
End: 2024-06-13
Payer: MEDICARE

## 2024-06-13 VITALS
HEIGHT: 61 IN | DIASTOLIC BLOOD PRESSURE: 76 MMHG | BODY MASS INDEX: 36.82 KG/M2 | HEART RATE: 73 BPM | SYSTOLIC BLOOD PRESSURE: 176 MMHG | WEIGHT: 195 LBS

## 2024-06-13 DIAGNOSIS — D25.9 LEIOMYOMA OF UTERUS, UNSPECIFIED: ICD-10-CM

## 2024-06-13 LAB
BILIRUB UR QL STRIP: NORMAL
GLUCOSE UR-MCNC: NORMAL
HCG UR QL: 0.2 EU/DL
HGB UR QL STRIP.AUTO: NORMAL
KETONES UR-MCNC: NORMAL
LEUKOCYTE ESTERASE UR QL STRIP: NORMAL
NITRITE UR QL STRIP: NORMAL
PH UR STRIP: 5
PROT UR STRIP-MCNC: NORMAL
SP GR UR STRIP: 1.02

## 2024-06-13 PROCEDURE — 99024 POSTOP FOLLOW-UP VISIT: CPT

## 2024-06-13 NOTE — HISTORY OF PRESENT ILLNESS
[FreeTextEntry1] : 72 yo P0 s/p TLH BSO minilap for specimen extraction, vag lac repair 5/24/24 for fibroids. Pt here with description of unfavorable post op course. She was displeased that she did not receive more guidance about what to expect after surgery. She also mentioned that while in the hospital her SCD was disconnected too soon after surgery, she was rushed off the phone with me so she could go down for USG to r/o DVT, she was discharged home too late - after her pharmacy closed, she still has issues w/urinary incontinence (worse than before the procedure), the left side of her incision . She has been able to rely on her support network for care. She contacted her urologist about the incontinence. She was expecting to hear from my office in the time after her surgery. Not hearing from this office upset her. She did not contact this office until three days ago when I spoke with her and asked her to come in.    F/u ucx RTO x 1 wk for wound check

## 2024-06-13 NOTE — PHYSICAL EXAM
[FreeTextEntry7] : Minilap with 1.2 cm superficial separation on the left side of the incision. Separation is 0.25 cm deep. Tissue is pink, edges are clean. Rt side of incision is  0.2 cm. Entire incision is clean and dry. [FreeTextEntry4] : Def

## 2024-06-13 NOTE — DISCUSSION/SUMMARY
[FreeTextEntry1] : 74 yo P0 s/p TLH BSO minilap, vag lac repair for fibroids, 5/24/24. Pt generally recovering well. Post op course has been noteworthy for inpt stay until POD#1, subpar experience around d/c, left sided wound separation - no e/o infection.  Pt otherwise recovering appropriately. Wound cleaned and light dressing applied. Concerns listened to and addressed. Path pending.  Plan Ucx F/u path Pt to f/u w/urology RTO x 1 wk for wound check and to check in vagina Precautions reviewed.

## 2024-06-15 LAB — BACTERIA UR CULT: NORMAL

## 2024-06-17 LAB — SURGICAL PATHOLOGY STUDY: SIGNIFICANT CHANGE UP

## 2024-06-19 ENCOUNTER — NON-APPOINTMENT (OUTPATIENT)
Age: 74
End: 2024-06-19

## 2024-06-19 ENCOUNTER — APPOINTMENT (OUTPATIENT)
Dept: INTERNAL MEDICINE | Facility: CLINIC | Age: 74
End: 2024-06-19
Payer: MEDICARE

## 2024-06-19 VITALS
HEART RATE: 73 BPM | HEIGHT: 61 IN | BODY MASS INDEX: 36.25 KG/M2 | OXYGEN SATURATION: 100 % | DIASTOLIC BLOOD PRESSURE: 73 MMHG | SYSTOLIC BLOOD PRESSURE: 157 MMHG | WEIGHT: 192 LBS

## 2024-06-19 DIAGNOSIS — R60.0 LOCALIZED EDEMA: ICD-10-CM

## 2024-06-19 DIAGNOSIS — I10 ESSENTIAL (PRIMARY) HYPERTENSION: ICD-10-CM

## 2024-06-19 DIAGNOSIS — N39.41 URGE INCONTINENCE: ICD-10-CM

## 2024-06-19 PROCEDURE — 99214 OFFICE O/P EST MOD 30 MIN: CPT

## 2024-06-19 PROCEDURE — G2211 COMPLEX E/M VISIT ADD ON: CPT

## 2024-06-19 RX ORDER — LOSARTAN POTASSIUM AND HYDROCHLOROTHIAZIDE 25; 100 MG/1; MG/1
100-25 TABLET ORAL DAILY
Qty: 90 | Refills: 0 | Status: DISCONTINUED | COMMUNITY
Start: 2024-05-15 | End: 2024-06-19

## 2024-06-19 RX ORDER — HYDROCHLOROTHIAZIDE 25 MG/1
25 TABLET ORAL
Qty: 60 | Refills: 0 | Status: ACTIVE | COMMUNITY
Start: 2024-06-19 | End: 1900-01-01

## 2024-06-19 RX ORDER — LOSARTAN POTASSIUM 100 MG/1
100 TABLET, FILM COATED ORAL DAILY
Qty: 90 | Refills: 1 | Status: ACTIVE | COMMUNITY
Start: 2024-06-19 | End: 1900-01-01

## 2024-06-19 NOTE — ASSESSMENT
[FreeTextEntry1] : Plan to follow up leg swelling, A1C and BP in 1 month Pt awaiting pedicatric consult

## 2024-06-19 NOTE — HISTORY OF PRESENT ILLNESS
[de-identified] : 74 yo F presenting for follow up. Feeling well overall. Notes some occasional leg cramping at night but magnesium helps.

## 2024-06-19 NOTE — PHYSICAL EXAM
[No Acute Distress] : no acute distress [Well-Appearing] : well-appearing [Normal Sclera/Conjunctiva] : normal sclera/conjunctiva [Normal Outer Ear/Nose] : the outer ears and nose were normal in appearance [No Lymphadenopathy] : no lymphadenopathy [No Respiratory Distress] : no respiratory distress  [No Accessory Muscle Use] : no accessory muscle use [No Spinal Tenderness] : no spinal tenderness [Grossly Normal Strength/Tone] : grossly normal strength/tone [Normal Affect] : the affect was normal [Alert and Oriented x3] : oriented to person, place, and time [de-identified] : ambulates with a cane [de-identified] : 2+ bilateral pitting edema

## 2024-06-20 ENCOUNTER — NON-APPOINTMENT (OUTPATIENT)
Age: 74
End: 2024-06-20

## 2024-06-20 ENCOUNTER — APPOINTMENT (OUTPATIENT)
Dept: OBGYN | Facility: CLINIC | Age: 74
End: 2024-06-20

## 2024-06-23 PROBLEM — C54.1 ENDOMETRIAL CANCER: Status: ACTIVE | Noted: 2024-06-23

## 2024-06-24 ENCOUNTER — APPOINTMENT (OUTPATIENT)
Dept: GYNECOLOGIC ONCOLOGY | Facility: CLINIC | Age: 74
End: 2024-06-24
Payer: MEDICARE

## 2024-06-24 ENCOUNTER — RESULT REVIEW (OUTPATIENT)
Age: 74
End: 2024-06-24

## 2024-06-24 ENCOUNTER — APPOINTMENT (OUTPATIENT)
Dept: OBGYN | Facility: CLINIC | Age: 74
End: 2024-06-24

## 2024-06-24 VITALS
BODY MASS INDEX: 36.25 KG/M2 | WEIGHT: 192 LBS | HEART RATE: 86 BPM | OXYGEN SATURATION: 99 % | HEIGHT: 61 IN | DIASTOLIC BLOOD PRESSURE: 83 MMHG | SYSTOLIC BLOOD PRESSURE: 163 MMHG

## 2024-06-24 DIAGNOSIS — C54.1 MALIGNANT NEOPLASM OF ENDOMETRIUM: ICD-10-CM

## 2024-06-24 DIAGNOSIS — Z87.19 PERSONAL HISTORY OF OTHER DISEASES OF THE DIGESTIVE SYSTEM: ICD-10-CM

## 2024-06-24 DIAGNOSIS — E11.9 TYPE 2 DIABETES MELLITUS W/OUT COMPLICATIONS: ICD-10-CM

## 2024-06-24 DIAGNOSIS — Z86.79 PERSONAL HISTORY OF OTHER DISEASES OF THE CIRCULATORY SYSTEM: ICD-10-CM

## 2024-06-24 DIAGNOSIS — Z80.9 FAMILY HISTORY OF MALIGNANT NEOPLASM, UNSPECIFIED: ICD-10-CM

## 2024-06-24 DIAGNOSIS — Z87.39 PERSONAL HISTORY OF OTHER DISEASES OF THE MUSCULOSKELETAL SYSTEM AND CONNECTIVE TISSUE: ICD-10-CM

## 2024-06-24 PROCEDURE — 99459 PELVIC EXAMINATION: CPT

## 2024-06-24 PROCEDURE — 99204 OFFICE O/P NEW MOD 45 MIN: CPT | Mod: 25

## 2024-06-24 PROCEDURE — 99214 OFFICE O/P EST MOD 30 MIN: CPT | Mod: 25

## 2024-06-24 PROCEDURE — 57100 BIOPSY VAGINAL MUCOSA SIMPLE: CPT

## 2024-06-28 LAB — CORE LAB BIOPSY: NORMAL

## 2024-07-01 ENCOUNTER — APPOINTMENT (OUTPATIENT)
Dept: CT IMAGING | Facility: CLINIC | Age: 74
End: 2024-07-01
Payer: MEDICARE

## 2024-07-01 ENCOUNTER — APPOINTMENT (OUTPATIENT)
Dept: MAMMOGRAPHY | Facility: CLINIC | Age: 74
End: 2024-07-01
Payer: MEDICARE

## 2024-07-01 ENCOUNTER — RESULT REVIEW (OUTPATIENT)
Age: 74
End: 2024-07-01

## 2024-07-01 PROCEDURE — 77067 SCR MAMMO BI INCL CAD: CPT

## 2024-07-01 PROCEDURE — 77063 BREAST TOMOSYNTHESIS BI: CPT

## 2024-07-01 PROCEDURE — 74177 CT ABD & PELVIS W/CONTRAST: CPT

## 2024-07-01 PROCEDURE — 71260 CT THORAX DX C+: CPT

## 2024-07-02 LAB — CANCER AG125 SERPL-ACNC: 6 U/ML

## 2024-07-03 ENCOUNTER — NON-APPOINTMENT (OUTPATIENT)
Age: 74
End: 2024-07-03

## 2024-07-08 ENCOUNTER — OUTPATIENT (OUTPATIENT)
Dept: OUTPATIENT SERVICES | Facility: HOSPITAL | Age: 74
LOS: 1 days | Discharge: HOME | End: 2024-07-08
Payer: MEDICARE

## 2024-07-08 DIAGNOSIS — Z98.49 CATARACT EXTRACTION STATUS, UNSPECIFIED EYE: Chronic | ICD-10-CM

## 2024-07-08 DIAGNOSIS — Z98.890 OTHER SPECIFIED POSTPROCEDURAL STATES: Chronic | ICD-10-CM

## 2024-07-09 ENCOUNTER — NON-APPOINTMENT (OUTPATIENT)
Age: 74
End: 2024-07-09

## 2024-07-15 ENCOUNTER — APPOINTMENT (OUTPATIENT)
Dept: OBGYN | Facility: CLINIC | Age: 74
End: 2024-07-15

## 2024-07-16 ENCOUNTER — RX RENEWAL (OUTPATIENT)
Age: 74
End: 2024-07-16

## 2024-07-22 ENCOUNTER — APPOINTMENT (OUTPATIENT)
Dept: INTERNAL MEDICINE | Facility: CLINIC | Age: 74
End: 2024-07-22
Payer: MEDICARE

## 2024-07-22 VITALS
BODY MASS INDEX: 36.06 KG/M2 | HEART RATE: 84 BPM | DIASTOLIC BLOOD PRESSURE: 56 MMHG | HEIGHT: 61 IN | SYSTOLIC BLOOD PRESSURE: 119 MMHG | WEIGHT: 191 LBS

## 2024-07-22 DIAGNOSIS — Z13.228 ENCOUNTER FOR SCREENING FOR OTHER METABOLIC DISORDERS: ICD-10-CM

## 2024-07-22 DIAGNOSIS — N39.41 URGE INCONTINENCE: ICD-10-CM

## 2024-07-22 DIAGNOSIS — E11.9 TYPE 2 DIABETES MELLITUS W/OUT COMPLICATIONS: ICD-10-CM

## 2024-07-22 DIAGNOSIS — I10 ESSENTIAL (PRIMARY) HYPERTENSION: ICD-10-CM

## 2024-07-22 PROCEDURE — G2211 COMPLEX E/M VISIT ADD ON: CPT

## 2024-07-22 PROCEDURE — 99214 OFFICE O/P EST MOD 30 MIN: CPT

## 2024-07-22 PROCEDURE — 36415 COLL VENOUS BLD VENIPUNCTURE: CPT

## 2024-07-22 NOTE — PHYSICAL EXAM
[No Acute Distress] : no acute distress [Well-Appearing] : well-appearing [Normal Sclera/Conjunctiva] : normal sclera/conjunctiva [Normal Outer Ear/Nose] : the outer ears and nose were normal in appearance [No Respiratory Distress] : no respiratory distress  [No Accessory Muscle Use] : no accessory muscle use [Clear to Auscultation] : lungs were clear to auscultation bilaterally [Normal Rate] : normal rate  [Regular Rhythm] : with a regular rhythm [Normal S1, S2] : normal S1 and S2 [Normal Gait] : normal gait [Normal Affect] : the affect was normal [Alert and Oriented x3] : oriented to person, place, and time [de-identified] : 1+ pitting edema, improved from previous visit [de-identified] : ambulates with a cane

## 2024-07-22 NOTE — HISTORY OF PRESENT ILLNESS
[FreeTextEntry1] : Follow Up from previous visit. Pt states she started Chemo treatment Wednesday last week. She states it's difficult to move her bowel. [de-identified] : 74 yo F presenting to follow up A1C, BP and leg swelling. She notes her leg swelling has decreased and she feels much better wearing her shoes. Her BP in office is in appropriate range but does not know the average readings because she does not have a BP cuff at home.   in office BGM: 165 (ate this morning- piece of cheese, almond milk, piece of almond bread) current meds: Farxiga, metformin 500 mg qd, glimepiride 2 mg BID monitors BGs: twice daily (Freestyle Shon) uses it with a monitor, not her smartphone BGs< 70? hypoglycemia symptoms? how often?- no  ophthalmology: follows with a Dr. Borges (sp?) in the office of [facility] [phone] podiatry: plans to reconnect with her podiatrist who works out of her apartment building

## 2024-07-24 LAB
ALBUMIN SERPL ELPH-MCNC: 4.3 G/DL
ALP BLD-CCNC: 130 U/L
ALT SERPL-CCNC: 12 U/L
ANION GAP SERPL CALC-SCNC: 13 MMOL/L
APPEARANCE: CLEAR
AST SERPL-CCNC: 17 U/L
BACTERIA: NEGATIVE /HPF
BILIRUB SERPL-MCNC: <0.2 MG/DL
BILIRUBIN URINE: NEGATIVE
BLOOD URINE: NEGATIVE
BUN SERPL-MCNC: 42 MG/DL
CALCIUM SERPL-MCNC: 9.9 MG/DL
CAST: 0 /LPF
CHLORIDE SERPL-SCNC: 101 MMOL/L
CHOLEST SERPL-MCNC: 170 MG/DL
CO2 SERPL-SCNC: 24 MMOL/L
COLOR: YELLOW
CREAT SERPL-MCNC: 1.21 MG/DL
CREAT SPEC-SCNC: 93 MG/DL
EGFR: 48 ML/MIN/1.73M2
EPITHELIAL CELLS: 1 /HPF
ESTIMATED AVERAGE GLUCOSE: 163 MG/DL
GLUCOSE QUALITATIVE U: >=1000 MG/DL
GLUCOSE SERPL-MCNC: 173 MG/DL
HBA1C MFR BLD HPLC: 7.3 %
HDLC SERPL-MCNC: 55 MG/DL
KETONES URINE: NEGATIVE MG/DL
LDLC SERPL CALC-MCNC: 91 MG/DL
LEUKOCYTE ESTERASE URINE: NEGATIVE
MICROALBUMIN 24H UR DL<=1MG/L-MCNC: 10.7 MG/DL
MICROALBUMIN/CREAT 24H UR-RTO: 115 MG/G
MICROSCOPIC-UA: NORMAL
NITRITE URINE: NEGATIVE
NONHDLC SERPL-MCNC: 115 MG/DL
PH URINE: 6
POTASSIUM SERPL-SCNC: 4.5 MMOL/L
PROT SERPL-MCNC: 7 G/DL
PROTEIN URINE: 30 MG/DL
RED BLOOD CELLS URINE: 0 /HPF
SODIUM SERPL-SCNC: 137 MMOL/L
SPECIFIC GRAVITY URINE: 1.02
TRIGL SERPL-MCNC: 137 MG/DL
TSH SERPL-ACNC: 1.09 UIU/ML
UROBILINOGEN URINE: 0.2 MG/DL
WHITE BLOOD CELLS URINE: 0 /HPF

## 2024-07-25 RX ORDER — SEMAGLUTIDE 0.68 MG/ML
2 INJECTION, SOLUTION SUBCUTANEOUS
Qty: 1 | Refills: 0 | Status: ACTIVE | COMMUNITY
Start: 2024-07-25 | End: 1900-01-01

## 2024-07-25 RX ORDER — METFORMIN ER 500 MG 500 MG/1
500 TABLET ORAL
Refills: 0 | Status: ACTIVE | COMMUNITY
Start: 2024-04-26

## 2024-08-05 ENCOUNTER — APPOINTMENT (OUTPATIENT)
Dept: UROLOGY | Facility: CLINIC | Age: 74
End: 2024-08-05

## 2024-08-05 PROCEDURE — 99215 OFFICE O/P EST HI 40 MIN: CPT

## 2024-08-05 NOTE — HISTORY OF PRESENT ILLNESS
[FreeTextEntry1] : 08/05/2024: Ms. TAYLOR is a 73-year-old female, former patient of Dr. 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  She was last seen in Feb 2024, with cc of urinary frequency and urinary incontinence. Pt reported bothersome frequency both day and night. Going every 2.5h during the day. Nocturia x3-4. Also complained of urinary urgency, and bothersome leakage. THis is most common at night when lying in bed. She will leak sometimes in bed and not really have warning. Has urge leakage both day and night. Rare stress incontinence. Is wearing 2 pads overnight. SHe is wearing one pad during the day and has some with her. Sometimes (50/50) pads are dry.  No straining or feelings of incomplete emptying. .  Pt was started on oxybutynin. She had noted no changes despite medication. Still with very bothersome frequency both day and night. Since last visit, also had MRI eval. Has multiple 5-9 cm fibroids. Not amenable to embo after Dr. Jones sent pt to IR.  SHe was subsequently Sent to GYN for eval. Planning on bx to ensure no adverse pathology and then procedure for fibroids.   Pt returns s/p complete hysterectomy on 5/20/2024 for serous epithelial carcinoma of the uterus. She started chemotherapy on 7/18/24 and is due for 6 courses.  She is still with complaints of urinary frequency, urgency with bothersome daytime and nighttime incontinence. She notes daytime incontinence only occurs when she lays down during the day.  She reports her symptoms have worsened since hysterectomy. She wears three pads at nighttime, sometimes 2 at a time.  Pt notes she is with both leaking without sensations (when she sits too long) and urge incontinence. She wakes up at night soaked with urine. She also wakes up with urgency and unable to reach the bathroom in time.  She time voids, every 3 hrs.   No h/o AUR, UTI and Gh  She reports minimal occult blood in stool 2 days ago. She knows she must see her GI and potential have a colonoscopy but says she is postponing due to dealing with chemotherapy.   -7/22/24 A1cg was elevated at 7.3  -7/22/24 Cr 1.21 and EGFR low at 42. (In 2022 Cr was 1 and egfr 61)  - 7/1 24 CT AP w Oral and IV cont showing renal cysts and Left LP 1.4 cm lesion, stable since 2021.   Never a smoker

## 2024-08-05 NOTE — REVIEW OF SYSTEMS
[see HPI] : see HPI [Wake up at night to urinate  How many times?  ___] : wakes up to urinate [unfilled] times during the night [Leakage of urine with urgency] : leakage of urine with urgency [Unaware of when urine is leaking] : unaware of when urine is leaking [Negative] : Heme/Lymph [Incontinence] : incontinence [Blood in urine that you can see] : denies seeing blood in urine [Urine retention] : denies urine retention [Strain or push to urinate] : denies straining or pushing to urinate [Leakage of urine with straining, coughing, laughing] : leakage of urine with straining, coughing, laughing [Joint Pain] : joint pain

## 2024-08-05 NOTE — PHYSICAL EXAM
[Normal Appearance] : normal appearance [General Appearance - In No Acute Distress] : no acute distress [] : no respiratory distress [Skin Color & Pigmentation] : normal skin color and pigmentation [No Focal Deficits] : no focal deficits [Oriented To Time, Place, And Person] : oriented to person, place, and time [Affect] : the affect was normal [Mood] : the mood was normal [de-identified] : Pt uses a cane

## 2024-08-05 NOTE — PHYSICAL EXAM
[Normal Appearance] : normal appearance [General Appearance - In No Acute Distress] : no acute distress [] : no respiratory distress [Skin Color & Pigmentation] : normal skin color and pigmentation [No Focal Deficits] : no focal deficits [Oriented To Time, Place, And Person] : oriented to person, place, and time [Affect] : the affect was normal [Mood] : the mood was normal [de-identified] : Pt uses a cane

## 2024-08-05 NOTE — ASSESSMENT
[FreeTextEntry1] : OAB wet--UUI predominant. No benefit with anticholinergic oxybutynin. 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   For nighttime voiding, I am considering Desmopressin. We would first start with half dose for a week.  But patient is taking Diuretics for HTN. She will enquire to her Cardiologist, Dr. Mauricio Serrano on the possibility of starting her on that.   For daytime voiding, explained to patient that patient with t2DM have delayed and impaired sensations. In addition, she recently had pelvic floor surgery which leads to further damage. -Pt is urged to time void; a behavioral technique involving scheduling specific times to use the bathroom, to avoid a sudden compelling desire to pass urine that is difficult to postpone after holding urine too long.  -Patient instructed to continue to double void; that is to allow her bladder to empty fully and then go back 20-30 min later (when convenient) to empty further WITHOUT straining. People need to take time to empty more fully.  Follow up after patient sees her Cardiologist.

## 2024-08-05 NOTE — ADDENDUM
[FreeTextEntry1] : This note was partly authored by Leonel Worthy working as a scribe for LONNIE Alan. I, LONNIE Alan, have reviewed the content of this note and confirm it is true and accurate. I personally performed the history and physical examination and made all the decisions. 08/05/2024.   
[FreeTextEntry1] : This note was partly authored by Leonel Worthy working as a scribe for LONNIE Alan. I, LONNIE Alan, have reviewed the content of this note and confirm it is true and accurate. I personally performed the history and physical examination and made all the decisions. 08/05/2024.   
no

## 2024-08-07 PROBLEM — N39.44 NOCTURNAL ENURESIS: Status: ACTIVE | Noted: 2024-08-05

## 2024-08-07 PROBLEM — N28.9 RENAL LESION: Status: ACTIVE | Noted: 2024-08-07

## 2024-08-10 ENCOUNTER — RX RENEWAL (OUTPATIENT)
Age: 74
End: 2024-08-10

## 2024-08-14 ENCOUNTER — APPOINTMENT (OUTPATIENT)
Dept: INTERNAL MEDICINE | Facility: CLINIC | Age: 74
End: 2024-08-14
Payer: MEDICARE

## 2024-08-14 VITALS
DIASTOLIC BLOOD PRESSURE: 62 MMHG | BODY MASS INDEX: 35.12 KG/M2 | HEART RATE: 84 BPM | HEIGHT: 61 IN | SYSTOLIC BLOOD PRESSURE: 123 MMHG | WEIGHT: 186 LBS

## 2024-08-14 DIAGNOSIS — E11.9 TYPE 2 DIABETES MELLITUS W/OUT COMPLICATIONS: ICD-10-CM

## 2024-08-14 DIAGNOSIS — I10 ESSENTIAL (PRIMARY) HYPERTENSION: ICD-10-CM

## 2024-08-14 DIAGNOSIS — M79.89 OTHER SPECIFIED SOFT TISSUE DISORDERS: ICD-10-CM

## 2024-08-14 DIAGNOSIS — Z13.31 ENCOUNTER FOR SCREENING FOR DEPRESSION: ICD-10-CM

## 2024-08-14 PROCEDURE — G2211 COMPLEX E/M VISIT ADD ON: CPT

## 2024-08-14 PROCEDURE — 99214 OFFICE O/P EST MOD 30 MIN: CPT

## 2024-08-14 PROCEDURE — G0444 DEPRESSION SCREEN ANNUAL: CPT | Mod: 59

## 2024-08-14 RX ORDER — PANTOPRAZOLE 40 MG/1
40 TABLET, DELAYED RELEASE ORAL DAILY
Refills: 0 | Status: ACTIVE | COMMUNITY

## 2024-08-15 PROBLEM — Z13.31 SCREENING FOR DEPRESSION: Status: ACTIVE | Noted: 2024-08-15

## 2024-08-16 NOTE — PHYSICAL EXAM
[No Acute Distress] : no acute distress [Well-Appearing] : well-appearing [Normal Sclera/Conjunctiva] : normal sclera/conjunctiva [Normal Outer Ear/Nose] : the outer ears and nose were normal in appearance [No Respiratory Distress] : no respiratory distress  [No Accessory Muscle Use] : no accessory muscle use [Grossly Normal Strength/Tone] : grossly normal strength/tone [Normal Affect] : the affect was normal [Alert and Oriented x3] : oriented to person, place, and time [No Focal Deficits] : no focal deficits [de-identified] : firm mass palpated on her right upper arm, present since most recent phlebotomy

## 2024-08-16 NOTE — PHYSICAL EXAM
[No Acute Distress] : no acute distress [Well-Appearing] : well-appearing [Normal Sclera/Conjunctiva] : normal sclera/conjunctiva [Normal Outer Ear/Nose] : the outer ears and nose were normal in appearance [No Respiratory Distress] : no respiratory distress  [No Accessory Muscle Use] : no accessory muscle use [Grossly Normal Strength/Tone] : grossly normal strength/tone [Normal Affect] : the affect was normal [Alert and Oriented x3] : oriented to person, place, and time [No Focal Deficits] : no focal deficits [de-identified] : firm mass palpated on her right upper arm, present since most recent phlebotomy

## 2024-08-16 NOTE — HEALTH RISK ASSESSMENT
[No] : In the past 12 months have you used drugs other than those required for medical reasons? No [No falls in past year] : Patient reported no falls in the past year [1] : 2) Feeling down, depressed, or hopeless for several days (1) [Never] : Never [Little interest or pleasure doing things] : 1) Little interest or pleasure doing things [Feeling down, depressed, or hopeless] : 2) Feeling down, depressed, or hopeless [PHQ-2 Negative - No further assessment needed] : PHQ-2 Negative - No further assessment needed [Time Spent: ___ Minutes] : I spent [unfilled] minutes performing a depression screening for this patient. [de-identified] : None [de-identified] : None [de-identified] : Limited Walk [de-identified] : Loss appetite [[id number]] : 2

## 2024-08-16 NOTE — HISTORY OF PRESENT ILLNESS
[de-identified] : 72 yo F presenting for follow up. lost weight on Ozempic was prescribed desmopressin because Myrbetriq was not working for her, but decided not to fill it due to the effects on her HPA axis plans to do reflexology on [address]- stimulate the organs through use of centers in the hand and bottom of the feet BGMs 110s-130s  currently takes losartan- HCTZ 100-12.5 mg, but she has been taking it BID she reduced it to once a day but still felt dizzy  podiatrist: Lazaro Manuel, ELENA.P.M. [phone] [place] [id number]

## 2024-08-16 NOTE — HISTORY OF PRESENT ILLNESS
[de-identified] : 72 yo F presenting for follow up. lost weight on Ozempic was prescribed desmopressin because Myrbetriq was not working for her, but decided not to fill it due to the effects on her HPA axis plans to do reflexology on [address]- stimulate the organs through use of centers in the hand and bottom of the feet BGMs 110s-130s  currently takes losartan- HCTZ 100-12.5 mg, but she has been taking it BID she reduced it to once a day but still felt dizzy  podiatrist: Lazaro Manuel, ELENA.P.M. [phone] [place] [id number]

## 2024-08-16 NOTE — HEALTH RISK ASSESSMENT
[No] : In the past 12 months have you used drugs other than those required for medical reasons? No [No falls in past year] : Patient reported no falls in the past year [1] : 2) Feeling down, depressed, or hopeless for several days (1) [Never] : Never [Little interest or pleasure doing things] : 1) Little interest or pleasure doing things [Feeling down, depressed, or hopeless] : 2) Feeling down, depressed, or hopeless [PHQ-2 Negative - No further assessment needed] : PHQ-2 Negative - No further assessment needed [Time Spent: ___ Minutes] : I spent [unfilled] minutes performing a depression screening for this patient. [de-identified] : None [de-identified] : None [de-identified] : Limited Walk [de-identified] : Loss appetite [[id number]] : 2

## 2024-08-16 NOTE — PHYSICAL EXAM
[No Acute Distress] : no acute distress [Well-Appearing] : well-appearing [Normal Sclera/Conjunctiva] : normal sclera/conjunctiva [Normal Outer Ear/Nose] : the outer ears and nose were normal in appearance [No Respiratory Distress] : no respiratory distress  [No Accessory Muscle Use] : no accessory muscle use [Grossly Normal Strength/Tone] : grossly normal strength/tone [Normal Affect] : the affect was normal [Alert and Oriented x3] : oriented to person, place, and time [No Focal Deficits] : no focal deficits [de-identified] : firm mass palpated on her right upper arm, present since most recent phlebotomy

## 2024-08-16 NOTE — HISTORY OF PRESENT ILLNESS
[de-identified] : 72 yo F presenting for follow up. lost weight on Ozempic was prescribed desmopressin because Myrbetriq was not working for her, but decided not to fill it due to the effects on her HPA axis plans to do reflexology on [address]- stimulate the organs through use of centers in the hand and bottom of the feet BGMs 110s-130s  currently takes losartan- HCTZ 100-12.5 mg, but she has been taking it BID she reduced it to once a day but still felt dizzy  podiatrist: Lazaro Manuel, ELENA.P.M. [phone] [place] [id number]

## 2024-08-16 NOTE — HEALTH RISK ASSESSMENT
[No] : In the past 12 months have you used drugs other than those required for medical reasons? No [No falls in past year] : Patient reported no falls in the past year [1] : 2) Feeling down, depressed, or hopeless for several days (1) [Never] : Never [Little interest or pleasure doing things] : 1) Little interest or pleasure doing things [Feeling down, depressed, or hopeless] : 2) Feeling down, depressed, or hopeless [PHQ-2 Negative - No further assessment needed] : PHQ-2 Negative - No further assessment needed [Time Spent: ___ Minutes] : I spent [unfilled] minutes performing a depression screening for this patient. [de-identified] : None [de-identified] : None [de-identified] : Limited Walk [de-identified] : Loss appetite [[id number]] : 2

## 2024-08-19 ENCOUNTER — APPOINTMENT (OUTPATIENT)
Dept: ULTRASOUND IMAGING | Facility: CLINIC | Age: 74
End: 2024-08-19
Payer: MEDICARE

## 2024-08-19 DIAGNOSIS — I82.90 ACUTE EMBOLISM AND THROMBOSIS OF UNSPECIFIED VEIN: ICD-10-CM

## 2024-08-19 PROCEDURE — 93971 EXTREMITY STUDY: CPT | Mod: RT

## 2024-08-28 ENCOUNTER — NON-APPOINTMENT (OUTPATIENT)
Age: 74
End: 2024-08-28

## 2024-08-28 RX ORDER — CHOLECALCIFEROL (VITAMIN D3) 10(400)/ML
32G X 4 MM DROPS ORAL
Qty: 1 | Refills: 0 | Status: ACTIVE | COMMUNITY
Start: 2024-08-28 | End: 1900-01-01

## 2024-08-29 ENCOUNTER — NON-APPOINTMENT (OUTPATIENT)
Age: 74
End: 2024-08-29

## 2024-08-29 RX ORDER — SEMAGLUTIDE 0.68 MG/ML
2 INJECTION, SOLUTION SUBCUTANEOUS
Qty: 3 | Refills: 0 | Status: ACTIVE | COMMUNITY
Start: 2024-08-28 | End: 1900-01-01

## 2024-09-18 ENCOUNTER — APPOINTMENT (OUTPATIENT)
Dept: RADIATION ONCOLOGY | Facility: CLINIC | Age: 74
End: 2024-09-18
Payer: MEDICARE

## 2024-09-18 VITALS
OXYGEN SATURATION: 99 % | HEIGHT: 61 IN | RESPIRATION RATE: 17 BRPM | TEMPERATURE: 98.6 F | WEIGHT: 193 LBS | BODY MASS INDEX: 36.44 KG/M2 | SYSTOLIC BLOOD PRESSURE: 155 MMHG | DIASTOLIC BLOOD PRESSURE: 75 MMHG | HEART RATE: 82 BPM

## 2024-09-18 DIAGNOSIS — C54.9 MALIGNANT NEOPLASM OF CORPUS UTERI, UNSPECIFIED: ICD-10-CM

## 2024-09-18 DIAGNOSIS — Z80.0 FAMILY HISTORY OF MALIGNANT NEOPLASM OF DIGESTIVE ORGANS: ICD-10-CM

## 2024-09-18 DIAGNOSIS — Z87.891 PERSONAL HISTORY OF NICOTINE DEPENDENCE: ICD-10-CM

## 2024-09-18 PROCEDURE — 99204 OFFICE O/P NEW MOD 45 MIN: CPT | Mod: GC

## 2024-09-18 NOTE — VITALS
[Maximal Pain Intensity: 0/10] : 0/10 [90: Able to carry normal activity; minor signs or symptoms of disease.] : 90: Able to carry normal activity; minor signs or symptoms of disease.  [Date: ____________] : Patient's last distress assessment performed on [unfilled]. [4 - Distress Level] : Distress Level: 4 [Referred Patient  to social work for follow-up] : Patient was referred to social work for follow-up

## 2024-09-19 ENCOUNTER — NON-APPOINTMENT (OUTPATIENT)
Age: 74
End: 2024-09-19

## 2024-09-23 ENCOUNTER — RESULT REVIEW (OUTPATIENT)
Age: 74
End: 2024-09-23

## 2024-09-23 ENCOUNTER — APPOINTMENT (OUTPATIENT)
Dept: GYNECOLOGIC ONCOLOGY | Facility: CLINIC | Age: 74
End: 2024-09-23
Payer: MEDICARE

## 2024-09-23 VITALS
HEIGHT: 61 IN | HEART RATE: 92 BPM | DIASTOLIC BLOOD PRESSURE: 74 MMHG | BODY MASS INDEX: 36.25 KG/M2 | WEIGHT: 192 LBS | SYSTOLIC BLOOD PRESSURE: 175 MMHG | OXYGEN SATURATION: 97 %

## 2024-09-23 DIAGNOSIS — C54.1 MALIGNANT NEOPLASM OF ENDOMETRIUM: ICD-10-CM

## 2024-09-23 PROCEDURE — 99214 OFFICE O/P EST MOD 30 MIN: CPT | Mod: 25

## 2024-09-23 PROCEDURE — 99459 PELVIC EXAMINATION: CPT

## 2024-09-23 PROCEDURE — 57100 BIOPSY VAGINAL MUCOSA SIMPLE: CPT

## 2024-09-23 NOTE — HISTORY OF PRESENT ILLNESS
[FreeTextEntry1] : Chief Complaint     New Patient Consultation for serous endometrial cancer  History of Present Illness     Med Onc/Ref: Dr. Flynn GYN: Dr. Pierce PCP: Henrietta Ramirez MD Cards: Zach Martínez MD GI: Joey Gordillo MD Endocrine: Dr. Seals Ortho: Dr. Ward Thompson  Ms. Bella, 73 years old is status post laparoscopic hysterectomy for fibroid uterus on 5/24/2024. Final path identified serous intraepithelial carcinoma (pT1a, pNx, pMx). Patient states she has been symptomatic with urinary frequency and incontinence, abdominal bloating and pain. These symptoms resulted in workup findings noting fibroid uterus. Final pathology on hysterectomy had incidental finding of serous intraepithelial carcinoma. She is referred for further management.  Denies f/c/n/v/d/abnormal vaginal bleeding. Denies changes to bowel habits; denies unexplained weight loss or gain. Denies abdominal pain, pressure, bloating or any other associated symptoms. She does still have symptoms of urinary incontinence, she is taking Myrbetriq without effective results. She is seeing Dr. Liset Villanueva (urology) on July 18, 2024 for further workup.  She follows with Dr. Flynn at [facility] for iron deficiency anemia. She has received a total of 8 iron infusions. Last hemoglobin on 6/20/2024 = 9.1. Patient started on C/T and is s/p 3 cycles s/p consult with Dr. Rdo

## 2024-09-23 NOTE — DISCUSSION/SUMMARY
[FreeTextEntry1] : patient tolerating treatment no side effects currently some weakness continue C/T fu 3 months due for vag brachy, saw Dr. Rod

## 2024-09-23 NOTE — PHYSICAL EXAM
[Chaperone Present] : A chaperone was present in the examining room during all aspects of the physical examination [[id number]] : A chaperone was present during the pelvic exam. [Absent] : Adnexa(ae): Absent [Normal] : Recto-Vaginal Exam: Normal [Fully active, able to carry on all pre-disease performance without restriction] : Status 0 - Fully active, able to carry on all pre-disease performance without restriction [de-identified] : vag cuff intact, +granulation tissue, bx done [de-identified] : smooth rectovag septum, no cul de sac nodules.

## 2024-09-23 NOTE — PROCEDURE
[Vaginal Biopsy] : a vaginal biopsy [Patient] : the patient [Written consent] : written consent was obtained prior to the procedure and is detailed in the patient's record [Yes] : the specimen was sent to pathology [No Complications] : none [Tolerated Well] : the patient tolerated the procedure well

## 2024-09-24 DIAGNOSIS — C54.2 MALIGNANT NEOPLASM OF MYOMETRIUM: ICD-10-CM

## 2024-09-24 PROBLEM — C54.9: Status: ACTIVE | Noted: 2024-09-24

## 2024-09-24 NOTE — HISTORY OF PRESENT ILLNESS
[FreeTextEntry1] : Ms. Bella is a 72 yo woman with Stage IC serous intraepithelial carcinoma who presents today for consultation for radiation therapy.   Her HPI as I understand it is summarized below:  Ms. Bella had a laparoscopic hysterectomy for fibroid uterus on 24. She had been symptomatic with urinary frequency and incontinence, bloating, and pain.  Pathology from the total hysterectomy and bilateral BSO identified serous intraepithelial carcinoma with background atropic endometrium, leiomyomata with degenerative changes (pT1aNx,Mx). MLH1, MSH2, MSH6, and PMS2 intact. She was referred to gyn onc and saw Dr. Bacon on 24. She had a right vaginal cuff biopsy that day which showed only granulation tissue. The case was discussed at Gyn onc tumor board and the recommendation was for a staging CT C/A/P, Carbo/Taxol and VBT. 24: CT C/A/P- No evidence of metastatic disease. sub centimeter lesion in the right middle abdomen, likely mesenteric LN. bilateral renal cysts and a left lower pole 1.4 cm lesion, stable since .   She started C1 Carbo/Taxol on 24 with Dr. Flynn at [facility] with whom she was an established patient for MGUS and iron deficiency anemia. Planning to complete C6 in early November.   She presents today for discussion of her radiation therapy options.  She is feeling very fatigued with chemo and notes peripheral neuropathy b/l feet. No n/v, no diarrhea or constipation, no vaginal bleeding, no abdominal or pelvic pain. Baseline urinary frequency and incontinence. She did not start Desmopressin.  Seeing Dr. Bacon on Monday.   PMH: urinary incontinence- follows with Dr. 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

## 2024-09-24 NOTE — HISTORY OF PRESENT ILLNESS
[FreeTextEntry1] : Ms. Bella is a 74 yo woman with Stage IC serous intraepithelial carcinoma who presents today for consultation for radiation therapy.   Her HPI as I understand it is summarized below:  Ms. Bella had a laparoscopic hysterectomy for fibroid uterus on 24. She had been symptomatic with urinary frequency and incontinence, bloating, and pain.  Pathology from the total hysterectomy and bilateral BSO identified serous intraepithelial carcinoma with background atropic endometrium, leiomyomata with degenerative changes (pT1aNx,Mx). MLH1, MSH2, MSH6, and PMS2 intact. She was referred to gyn onc and saw Dr. Bacon on 24. She had a right vaginal cuff biopsy that day which showed only granulation tissue. The case was discussed at Gyn onc tumor board and the recommendation was for a staging CT C/A/P, Carbo/Taxol and VBT. 24: CT C/A/P- No evidence of metastatic disease. sub centimeter lesion in the right middle abdomen, likely mesenteric LN. bilateral renal cysts and a left lower pole 1.4 cm lesion, stable since .   She started C1 Carbo/Taxol on 24 with Dr. Flynn at [facility] with whom she was an established patient for MGUS and iron deficiency anemia. Planning to complete C6 in early November.   She presents today for discussion of her radiation therapy options.  She is feeling very fatigued with chemo and notes peripheral neuropathy b/l feet. No n/v, no diarrhea or constipation, no vaginal bleeding, no abdominal or pelvic pain. Baseline urinary frequency and incontinence. She did not start Desmopressin.  Seeing Dr. Bacon on Monday.   PMH: urinary incontinence- follows with Dr. 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

## 2024-09-24 NOTE — PHYSICAL EXAM
[Normal] : well developed, well nourished, in no acute distress [] : no respiratory distress [Respiration, Rhythm And Depth] : normal respiratory rhythm and effort [Abdomen Soft] : soft [Abdomen Tenderness] : non-tender [No Focal Deficits] : no focal deficits [Oriented To Time, Place, And Person] : oriented to person, place, and time [Affect] : the affect was normal [de-identified] : b/l LE edema

## 2024-09-24 NOTE — HISTORY OF PRESENT ILLNESS
[FreeTextEntry1] : Ms. Bella is a 72 yo woman with Stage IC serous intraepithelial carcinoma who presents today for consultation for radiation therapy.   Her HPI as I understand it is summarized below:  Ms. Bella had a laparoscopic hysterectomy for fibroid uterus on 24. She had been symptomatic with urinary frequency and incontinence, bloating, and pain.  Pathology from the total hysterectomy and bilateral BSO identified serous intraepithelial carcinoma with background atropic endometrium, leiomyomata with degenerative changes (pT1aNx,Mx). MLH1, MSH2, MSH6, and PMS2 intact. She was referred to gyn onc and saw Dr. Bacon on 24. She had a right vaginal cuff biopsy that day which showed only granulation tissue. The case was discussed at Gyn onc tumor board and the recommendation was for a staging CT C/A/P, Carbo/Taxol and VBT. 24: CT C/A/P- No evidence of metastatic disease. sub centimeter lesion in the right middle abdomen, likely mesenteric LN. bilateral renal cysts and a left lower pole 1.4 cm lesion, stable since .   She started C1 Carbo/Taxol on 24 with Dr. Flynn at [facility] with whom she was an established patient for MGUS and iron deficiency anemia. Planning to complete C6 in early November.   She presents today for discussion of her radiation therapy options.  She is feeling very fatigued with chemo and notes peripheral neuropathy b/l feet. No n/v, no diarrhea or constipation, no vaginal bleeding, no abdominal or pelvic pain. Baseline urinary frequency and incontinence. She did not start Desmopressin.  Seeing Dr. Bacon on Monday.   PMH: urinary incontinence- follows with Dr. 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

## 2024-09-24 NOTE — OB/GYN HISTORY
[Definite:  ___ (Date)] : the last menstrual period was [unfilled] [History of Birth Control Pills] : Patient has a history of taking birth control pills [Menopause Age: ____] : patient was [unfilled] years old at menopause [___] : Living: [unfilled] [History of Hormone Replacement Therapy] : no history of hormone replacement therapy no

## 2024-09-24 NOTE — REVIEW OF SYSTEMS
[Fatigue] : fatigue [Lower Ext Edema] : lower extremity edema [Negative] : Psychiatric [FreeTextEntry8] : as noted

## 2024-09-24 NOTE — PHYSICAL EXAM
[Normal] : well developed, well nourished, in no acute distress [] : no respiratory distress [Respiration, Rhythm And Depth] : normal respiratory rhythm and effort [Abdomen Soft] : soft [Abdomen Tenderness] : non-tender [No Focal Deficits] : no focal deficits [Oriented To Time, Place, And Person] : oriented to person, place, and time [Affect] : the affect was normal [de-identified] : b/l LE edema

## 2024-09-24 NOTE — PHYSICAL EXAM
[Normal] : well developed, well nourished, in no acute distress [] : no respiratory distress [Respiration, Rhythm And Depth] : normal respiratory rhythm and effort [Abdomen Soft] : soft [Abdomen Tenderness] : non-tender [No Focal Deficits] : no focal deficits [Oriented To Time, Place, And Person] : oriented to person, place, and time [Affect] : the affect was normal [de-identified] : b/l LE edema

## 2024-09-24 NOTE — OB/GYN HISTORY
[Definite:  ___ (Date)] : the last menstrual period was [unfilled] [History of Birth Control Pills] : Patient has a history of taking birth control pills [Menopause Age: ____] : patient was [unfilled] years old at menopause [___] : Living: [unfilled] [History of Hormone Replacement Therapy] : no history of hormone replacement therapy

## 2024-10-07 NOTE — HEALTH RISK ASSESSMENT
Subjective   History of Present Illness    42-year-old female presenting to the emergency department for evaluation of intermittent lightheadedness for the last 24 hours.  First episode was while working outside at home, she started feeling lightheaded with tunnel vision, felt she was in a pass out.  She laid down and this passed, however she has had persistent issues with near syncope every time she is gotten up ever since.  She reports she drinks about 1 to 2 L of water a day.  She does workout a lot.  She is never had issues with near syncope in the past, denies PE risk factors, no cardiac history, no family history of CAD.  She also had associated bifrontal headache as well as some muscle soreness in her neck but no neck stiffness.  No ear pain, hearing loss, tinnitus.  Does not take any medications, uses alcohol socially but no tobacco or recreational drug use.    Review of Systems    ROS as specified in HPI.    History reviewed. No pertinent past medical history.    No Known Allergies    History reviewed. No pertinent surgical history.    History reviewed. No pertinent family history.    Social History     Socioeconomic History    Marital status:            Objective   Physical Exam  Vitals reviewed.   Constitutional:       General: She is not in acute distress.     Appearance: She is normal weight. She is not toxic-appearing.   HENT:      Head: Normocephalic and atraumatic.      Ears:      Comments: Tms BL w/ some serous effusion, no erythema or purulence     Nose: Nose normal. No congestion.      Mouth/Throat:      Mouth: Mucous membranes are moist.      Pharynx: Oropharynx is clear.   Eyes:      General: No scleral icterus.     Conjunctiva/sclera: Conjunctivae normal.   Cardiovascular:      Rate and Rhythm: Normal rate and regular rhythm.      Pulses: Normal pulses.      Heart sounds: No murmur heard.  Pulmonary:      Effort: Pulmonary effort is normal. No respiratory distress.      Breath sounds: No  [Intercurrent hospitalizations] : was admitted to the hospital  wheezing, rhonchi or rales.   Abdominal:      General: There is no distension.      Palpations: Abdomen is soft.      Tenderness: There is no abdominal tenderness.   Musculoskeletal:         General: No swelling. Normal range of motion.      Cervical back: Normal range of motion and neck supple. No rigidity.   Lymphadenopathy:      Cervical: Cervical adenopathy present.   Skin:     General: Skin is warm and dry.      Capillary Refill: Capillary refill takes less than 2 seconds.      Coloration: Skin is not jaundiced.   Neurological:      Mental Status: She is alert and oriented to person, place, and time. Mental status is at baseline.   Psychiatric:         Mood and Affect: Mood normal.         Behavior: Behavior normal.         Procedures           ED Course  ED Course as of 10/07/24 1831   Mon Oct 07, 2024   1826 CT Head IMPRESSION:  Impression:  No acute intracranial finding.     Exophytic left posterior parietal scalp soft tissue lesion measuring up to 1.7 cm. Recommend correlation with physical exam.   [UU]   1826 ECG 12 Lead Other; Near-syncope [UU]   1826 Creatinine: 0.85 [UU]   1826 WBC: 6.44 [UU]   1826 Hemoglobin: 13.9 [UU]   1826 Sed Rate: 4 [UU]   1826 COVID19: Not Detected [UU]      ED Course User Index  [UU] Lizette Nolasco MD                                             Medical Decision Making  Care assumed from Dr. Jacobo pending labs.  Patient seen and examined.  Symptom onset over the past 24 hours with constant headache behind her eyes with dizziness.  Patient is concerned as her grandmother had brain tumor.  Labs are all within normal limits including CRP and sed rate.  CT of the head is also negative except for exophytic lesion left parietal area.  Patient states this is her normal for which she is followed by her dermatologist.  Patient is otherwise reassured.   She is advised to try Excedrin Migraine and follow-up with a primary care physician for further guidance.  She and  are both  [de-identified] : Hysterectomy agreeable with this plan.    Amount and/or Complexity of Data Reviewed  Labs: ordered. Decision-making details documented in ED Course.  Radiology: ordered. Decision-making details documented in ED Course.  ECG/medicine tests: ordered.        Final diagnoses:   Nonintractable headache, unspecified chronicity pattern, unspecified headache type   Postural dizziness       ED Disposition  ED Disposition       ED Disposition   Discharge    Condition   Stable    Comment   --               Provider, No Known  [place] [id number]  [phone]    In 3 days           Medication List      No changes were made to your prescriptions during this visit.         ED Course: 42-year-old female presenting for near syncope, recurrent, positional x 1 day.  Vital stable on arrival, exam reassuring, no evidence of intracranial process, orthostatics are borderline.  Will give 1 L fluids, check basic lab work, EKG.  Symptomatology is most consistent with orthostatic hypotension, low suspicion for ACS, PERC negative.  Patient signed out to Dr. Estrada at time of shift change w/ remainder of workup pending.     EKG: Pending    Consults: None    Incidental Findings: None         Lizette Nolasco MD  10/07/24 3191

## 2024-10-14 ENCOUNTER — APPOINTMENT (OUTPATIENT)
Dept: INTERNAL MEDICINE | Facility: CLINIC | Age: 74
End: 2024-10-14

## 2024-10-16 ENCOUNTER — INPATIENT (INPATIENT)
Facility: HOSPITAL | Age: 74
LOS: 2 days | Discharge: HOME | DRG: 812 | End: 2024-10-19
Attending: STUDENT IN AN ORGANIZED HEALTH CARE EDUCATION/TRAINING PROGRAM | Admitting: STUDENT IN AN ORGANIZED HEALTH CARE EDUCATION/TRAINING PROGRAM
Payer: MEDICARE

## 2024-10-16 VITALS
DIASTOLIC BLOOD PRESSURE: 73 MMHG | OXYGEN SATURATION: 96 % | SYSTOLIC BLOOD PRESSURE: 148 MMHG | RESPIRATION RATE: 17 BRPM | TEMPERATURE: 99 F | HEART RATE: 94 BPM

## 2024-10-16 DIAGNOSIS — Z98.890 OTHER SPECIFIED POSTPROCEDURAL STATES: Chronic | ICD-10-CM

## 2024-10-16 DIAGNOSIS — K92.2 GASTROINTESTINAL HEMORRHAGE, UNSPECIFIED: ICD-10-CM

## 2024-10-16 DIAGNOSIS — Z98.49 CATARACT EXTRACTION STATUS, UNSPECIFIED EYE: Chronic | ICD-10-CM

## 2024-10-16 DIAGNOSIS — I10 ESSENTIAL (PRIMARY) HYPERTENSION: ICD-10-CM

## 2024-10-16 DIAGNOSIS — K21.9 GASTRO-ESOPHAGEAL REFLUX DISEASE WITHOUT ESOPHAGITIS: ICD-10-CM

## 2024-10-16 DIAGNOSIS — C54.1 MALIGNANT NEOPLASM OF ENDOMETRIUM: ICD-10-CM

## 2024-10-16 DIAGNOSIS — D64.9 ANEMIA, UNSPECIFIED: ICD-10-CM

## 2024-10-16 DIAGNOSIS — Z29.9 ENCOUNTER FOR PROPHYLACTIC MEASURES, UNSPECIFIED: ICD-10-CM

## 2024-10-16 DIAGNOSIS — E11.9 TYPE 2 DIABETES MELLITUS WITHOUT COMPLICATIONS: ICD-10-CM

## 2024-10-16 LAB
ABO RH CONFIRMATION: SIGNIFICANT CHANGE UP
ALBUMIN SERPL ELPH-MCNC: 3.1 G/DL — LOW (ref 3.5–5)
ALP SERPL-CCNC: 107 U/L — SIGNIFICANT CHANGE UP (ref 40–120)
ALT FLD-CCNC: 20 U/L DA — SIGNIFICANT CHANGE UP (ref 10–60)
ANION GAP SERPL CALC-SCNC: 4 MMOL/L — LOW (ref 5–17)
ANISOCYTOSIS BLD QL: SLIGHT — SIGNIFICANT CHANGE UP
AST SERPL-CCNC: 14 U/L — SIGNIFICANT CHANGE UP (ref 10–40)
BASOPHILS # BLD AUTO: 0.02 K/UL — SIGNIFICANT CHANGE UP (ref 0–0.2)
BASOPHILS NFR BLD AUTO: 0.3 % — SIGNIFICANT CHANGE UP (ref 0–2)
BILIRUB SERPL-MCNC: <0.1 MG/DL — LOW (ref 0.2–1.2)
BLD GP AB SCN SERPL QL: SIGNIFICANT CHANGE UP
BUN SERPL-MCNC: 27 MG/DL — HIGH (ref 7–18)
CALCIUM SERPL-MCNC: 8.9 MG/DL — SIGNIFICANT CHANGE UP (ref 8.4–10.5)
CHLORIDE SERPL-SCNC: 114 MMOL/L — HIGH (ref 96–108)
CO2 SERPL-SCNC: 25 MMOL/L — SIGNIFICANT CHANGE UP (ref 22–31)
CREAT SERPL-MCNC: 1.05 MG/DL — SIGNIFICANT CHANGE UP (ref 0.5–1.3)
EGFR: 57 ML/MIN/1.73M2 — LOW
EGFR: 57 ML/MIN/1.73M2 — LOW
EOSINOPHIL # BLD AUTO: 0.03 K/UL — SIGNIFICANT CHANGE UP (ref 0–0.5)
EOSINOPHIL NFR BLD AUTO: 0.5 % — SIGNIFICANT CHANGE UP (ref 0–6)
FERRITIN SERPL-MCNC: 857 NG/ML — HIGH (ref 13–330)
GLUCOSE BLDC GLUCOMTR-MCNC: 104 MG/DL — HIGH (ref 70–99)
GLUCOSE BLDC GLUCOMTR-MCNC: 107 MG/DL — HIGH (ref 70–99)
GLUCOSE SERPL-MCNC: 118 MG/DL — HIGH (ref 70–99)
HCT VFR BLD CALC: 17.1 % — CRITICAL LOW (ref 34.5–45)
HCT VFR BLD CALC: 20.3 % — CRITICAL LOW (ref 34.5–45)
HGB BLD-MCNC: 5.3 G/DL — CRITICAL LOW (ref 11.5–15.5)
HGB BLD-MCNC: 6.4 G/DL — CRITICAL LOW (ref 11.5–15.5)
IMM GRANULOCYTES NFR BLD AUTO: 1.7 % — HIGH (ref 0–0.9)
IRON SATN MFR SERPL: 21 % — SIGNIFICANT CHANGE UP (ref 15–50)
IRON SATN MFR SERPL: 49 UG/DL — SIGNIFICANT CHANGE UP (ref 40–150)
LDH SERPL L TO P-CCNC: 191 U/L — SIGNIFICANT CHANGE UP (ref 120–225)
LYMPHOCYTES # BLD AUTO: 1.77 K/UL — SIGNIFICANT CHANGE UP (ref 1–3.3)
LYMPHOCYTES # BLD AUTO: 29.4 % — SIGNIFICANT CHANGE UP (ref 13–44)
MACROCYTES BLD QL: SLIGHT — SIGNIFICANT CHANGE UP
MANUAL SMEAR VERIFICATION: SIGNIFICANT CHANGE UP
MCHC RBC-ENTMCNC: 30 PG — SIGNIFICANT CHANGE UP (ref 27–34)
MCHC RBC-ENTMCNC: 30.3 PG — SIGNIFICANT CHANGE UP (ref 27–34)
MCHC RBC-ENTMCNC: 31 GM/DL — LOW (ref 32–36)
MCHC RBC-ENTMCNC: 31.5 GM/DL — LOW (ref 32–36)
MCV RBC AUTO: 95.3 FL — SIGNIFICANT CHANGE UP (ref 80–100)
MCV RBC AUTO: 97.7 FL — SIGNIFICANT CHANGE UP (ref 80–100)
MICROCYTES BLD QL: SLIGHT — SIGNIFICANT CHANGE UP
MONOCYTES # BLD AUTO: 0.4 K/UL — SIGNIFICANT CHANGE UP (ref 0–0.9)
MONOCYTES NFR BLD AUTO: 6.6 % — SIGNIFICANT CHANGE UP (ref 2–14)
NEUTROPHILS # BLD AUTO: 3.71 K/UL — SIGNIFICANT CHANGE UP (ref 1.8–7.4)
NEUTROPHILS NFR BLD AUTO: 61.5 % — SIGNIFICANT CHANGE UP (ref 43–77)
NRBC # BLD: 0 /100 WBCS — SIGNIFICANT CHANGE UP (ref 0–0)
NRBC # BLD: 0 /100 WBCS — SIGNIFICANT CHANGE UP (ref 0–0)
NRBC BLD-RTO: 0 /100 WBCS — SIGNIFICANT CHANGE UP (ref 0–0)
NRBC BLD-RTO: 0 /100 WBCS — SIGNIFICANT CHANGE UP (ref 0–0)
PLAT MORPH BLD: NORMAL — SIGNIFICANT CHANGE UP
PLATELET # BLD AUTO: 325 K/UL — SIGNIFICANT CHANGE UP (ref 150–400)
PLATELET # BLD AUTO: 338 K/UL — SIGNIFICANT CHANGE UP (ref 150–400)
POLYCHROMASIA BLD QL SMEAR: SLIGHT — SIGNIFICANT CHANGE UP
POTASSIUM SERPL-MCNC: 4.4 MMOL/L — SIGNIFICANT CHANGE UP (ref 3.5–5.3)
POTASSIUM SERPL-SCNC: 4.4 MMOL/L — SIGNIFICANT CHANGE UP (ref 3.5–5.3)
PROT SERPL-MCNC: 6.4 G/DL — SIGNIFICANT CHANGE UP (ref 6–8.3)
RBC # BLD: 1.75 M/UL — LOW (ref 3.8–5.2)
RBC # BLD: 2.08 M/UL — LOW (ref 3.8–5.2)
RBC # BLD: 2.13 M/UL — LOW (ref 3.8–5.2)
RBC # FLD: 20.6 % — HIGH (ref 10.3–14.5)
RBC # FLD: 20.6 % — HIGH (ref 10.3–14.5)
RBC BLD AUTO: ABNORMAL
RETICS #: 113.6 K/UL — SIGNIFICANT CHANGE UP (ref 25–125)
RETICS/RBC NFR: 5.5 % — HIGH (ref 0.5–2.5)
SODIUM SERPL-SCNC: 143 MMOL/L — SIGNIFICANT CHANGE UP (ref 135–145)
TIBC SERPL-MCNC: 235 UG/DL — LOW (ref 250–450)
UIBC SERPL-MCNC: 186 UG/DL — SIGNIFICANT CHANGE UP (ref 110–370)
WBC # BLD: 5.2 K/UL — SIGNIFICANT CHANGE UP (ref 3.8–10.5)
WBC # BLD: 6.03 K/UL — SIGNIFICANT CHANGE UP (ref 3.8–10.5)
WBC # FLD AUTO: 5.2 K/UL — SIGNIFICANT CHANGE UP (ref 3.8–10.5)
WBC # FLD AUTO: 6.03 K/UL — SIGNIFICANT CHANGE UP (ref 3.8–10.5)

## 2024-10-16 PROCEDURE — 99223 1ST HOSP IP/OBS HIGH 75: CPT | Mod: GC,AI

## 2024-10-16 PROCEDURE — 99285 EMERGENCY DEPT VISIT HI MDM: CPT

## 2024-10-16 RX ORDER — ACETAMINOPHEN 500 MG/5ML
650 LIQUID (ML) ORAL EVERY 6 HOURS
Refills: 0 | Status: DISCONTINUED | OUTPATIENT
Start: 2024-10-16 | End: 2024-10-19

## 2024-10-16 RX ORDER — INFLUENZA A VIRUS A/IDAHO/07/2018 (H1N1) ANTIGEN (MDCK CELL DERIVED, PROPIOLACTONE INACTIVATED, INFLUENZA A VIRUS A/INDIANA/08/2018 (H3N2) ANTIGEN (MDCK CELL DERIVED, PROPIOLACTONE INACTIVATED), INFLUENZA B VIRUS B/SINGAPORE/INFTT-16-0610/2016 ANTIGEN (MDCK CELL DERIVED, PROPIOLACTONE INACTIVATED), INFLUENZA B VIRUS B/IOWA/06/2017 ANTIGEN (MDCK CELL DERIVED, PROPIOLACTONE INACTIVATED) 15; 15; 15; 15 UG/.5ML; UG/.5ML; UG/.5ML; UG/.5ML
0.5 INJECTION, SUSPENSION INTRAMUSCULAR ONCE
Refills: 0 | Status: DISCONTINUED | OUTPATIENT
Start: 2024-10-16 | End: 2024-10-19

## 2024-10-16 RX ORDER — POTASSIUM CHLORIDE, DEXTROSE MONOHYDRATE AND SODIUM CHLORIDE 150; 5; 900 MG/100ML; G/100ML; MG/100ML
1000 INJECTION, SOLUTION INTRAVENOUS
Refills: 0 | Status: DISCONTINUED | OUTPATIENT
Start: 2024-10-16 | End: 2024-10-16

## 2024-10-16 RX ORDER — INSULIN LISPRO 100 U/ML
INJECTION, SOLUTION INTRAVENOUS; SUBCUTANEOUS EVERY 6 HOURS
Refills: 0 | Status: DISCONTINUED | OUTPATIENT
Start: 2024-10-16 | End: 2024-10-18

## 2024-10-16 RX ORDER — SODIUM CHLORIDE 9 G/1000ML
1000 INJECTION, SOLUTION INTRAVENOUS
Refills: 0 | Status: DISCONTINUED | OUTPATIENT
Start: 2024-10-16 | End: 2024-10-18

## 2024-10-16 RX ADMIN — Medication 40 MILLIGRAM(S): at 17:31

## 2024-10-17 PROBLEM — M19.90 UNSPECIFIED OSTEOARTHRITIS, UNSPECIFIED SITE: Chronic | Status: INACTIVE | Noted: 2024-05-09 | Resolved: 2024-10-16

## 2024-10-17 PROBLEM — Z92.89 PERSONAL HISTORY OF OTHER MEDICAL TREATMENT: Chronic | Status: INACTIVE | Noted: 2024-05-09 | Resolved: 2024-10-16

## 2024-10-17 PROBLEM — R26.89 OTHER ABNORMALITIES OF GAIT AND MOBILITY: Chronic | Status: INACTIVE | Noted: 2024-05-09 | Resolved: 2024-10-16

## 2024-10-17 PROBLEM — S43.006A UNSPECIFIED DISLOCATION OF UNSPECIFIED SHOULDER JOINT, INITIAL ENCOUNTER: Chronic | Status: INACTIVE | Noted: 2024-05-09 | Resolved: 2024-10-16

## 2024-10-17 LAB
A1C WITH ESTIMATED AVERAGE GLUCOSE RESULT: 6.5 % — HIGH (ref 4–5.6)
ALBUMIN SERPL ELPH-MCNC: 3.1 G/DL — LOW (ref 3.5–5)
ALP SERPL-CCNC: 102 U/L — SIGNIFICANT CHANGE UP (ref 40–120)
ALT FLD-CCNC: 20 U/L DA — SIGNIFICANT CHANGE UP (ref 10–60)
ANION GAP SERPL CALC-SCNC: 6 MMOL/L — SIGNIFICANT CHANGE UP (ref 5–17)
AST SERPL-CCNC: 16 U/L — SIGNIFICANT CHANGE UP (ref 10–40)
BILIRUB SERPL-MCNC: 0.2 MG/DL — SIGNIFICANT CHANGE UP (ref 0.2–1.2)
BUN SERPL-MCNC: 15 MG/DL — SIGNIFICANT CHANGE UP (ref 7–18)
CALCIUM SERPL-MCNC: 9.2 MG/DL — SIGNIFICANT CHANGE UP (ref 8.4–10.5)
CHLORIDE SERPL-SCNC: 113 MMOL/L — HIGH (ref 96–108)
CO2 SERPL-SCNC: 24 MMOL/L — SIGNIFICANT CHANGE UP (ref 22–31)
CREAT SERPL-MCNC: 0.85 MG/DL — SIGNIFICANT CHANGE UP (ref 0.5–1.3)
EGFR: 73 ML/MIN/1.73M2 — SIGNIFICANT CHANGE UP
EGFR: 73 ML/MIN/1.73M2 — SIGNIFICANT CHANGE UP
ESTIMATED AVERAGE GLUCOSE: 140 MG/DL — HIGH (ref 68–114)
GLUCOSE BLDC GLUCOMTR-MCNC: 86 MG/DL — SIGNIFICANT CHANGE UP (ref 70–99)
GLUCOSE BLDC GLUCOMTR-MCNC: 92 MG/DL — SIGNIFICANT CHANGE UP (ref 70–99)
GLUCOSE BLDC GLUCOMTR-MCNC: 99 MG/DL — SIGNIFICANT CHANGE UP (ref 70–99)
GLUCOSE SERPL-MCNC: 94 MG/DL — SIGNIFICANT CHANGE UP (ref 70–99)
HAPTOGLOB SERPL-MCNC: 206 MG/DL — HIGH (ref 34–200)
HCT VFR BLD CALC: 24 % — LOW (ref 34.5–45)
HCT VFR BLD CALC: 24.6 % — LOW (ref 34.5–45)
HCT VFR BLD CALC: 26.1 % — LOW (ref 34.5–45)
HGB BLD-MCNC: 7.7 G/DL — LOW (ref 11.5–15.5)
HGB BLD-MCNC: 7.8 G/DL — LOW (ref 11.5–15.5)
HGB BLD-MCNC: 8.3 G/DL — LOW (ref 11.5–15.5)
MAGNESIUM SERPL-MCNC: 1.9 MG/DL — SIGNIFICANT CHANGE UP (ref 1.6–2.6)
MCHC RBC-ENTMCNC: 29.7 PG — SIGNIFICANT CHANGE UP (ref 27–34)
MCHC RBC-ENTMCNC: 29.8 PG — SIGNIFICANT CHANGE UP (ref 27–34)
MCHC RBC-ENTMCNC: 29.9 PG — SIGNIFICANT CHANGE UP (ref 27–34)
MCHC RBC-ENTMCNC: 31.7 GM/DL — LOW (ref 32–36)
MCHC RBC-ENTMCNC: 31.8 GM/DL — LOW (ref 32–36)
MCHC RBC-ENTMCNC: 32.1 GM/DL — SIGNIFICANT CHANGE UP (ref 32–36)
MCV RBC AUTO: 92.7 FL — SIGNIFICANT CHANGE UP (ref 80–100)
MCV RBC AUTO: 93.9 FL — SIGNIFICANT CHANGE UP (ref 80–100)
MCV RBC AUTO: 93.9 FL — SIGNIFICANT CHANGE UP (ref 80–100)
NRBC # BLD: 0 /100 WBCS — SIGNIFICANT CHANGE UP (ref 0–0)
NRBC BLD-RTO: 0 /100 WBCS — SIGNIFICANT CHANGE UP (ref 0–0)
PHOSPHATE SERPL-MCNC: 3.6 MG/DL — SIGNIFICANT CHANGE UP (ref 2.5–4.5)
PLATELET # BLD AUTO: 316 K/UL — SIGNIFICANT CHANGE UP (ref 150–400)
PLATELET # BLD AUTO: 341 K/UL — SIGNIFICANT CHANGE UP (ref 150–400)
PLATELET # BLD AUTO: 342 K/UL — SIGNIFICANT CHANGE UP (ref 150–400)
POTASSIUM SERPL-MCNC: 4.3 MMOL/L — SIGNIFICANT CHANGE UP (ref 3.5–5.3)
POTASSIUM SERPL-SCNC: 4.3 MMOL/L — SIGNIFICANT CHANGE UP (ref 3.5–5.3)
PROT SERPL-MCNC: 6.5 G/DL — SIGNIFICANT CHANGE UP (ref 6–8.3)
RBC # BLD: 2.59 M/UL — LOW (ref 3.8–5.2)
RBC # BLD: 2.62 M/UL — LOW (ref 3.8–5.2)
RBC # BLD: 2.78 M/UL — LOW (ref 3.8–5.2)
RBC # FLD: 19.9 % — HIGH (ref 10.3–14.5)
RBC # FLD: 20.4 % — HIGH (ref 10.3–14.5)
RBC # FLD: 20.9 % — HIGH (ref 10.3–14.5)
SODIUM SERPL-SCNC: 143 MMOL/L — SIGNIFICANT CHANGE UP (ref 135–145)
WBC # BLD: 4.95 K/UL — SIGNIFICANT CHANGE UP (ref 3.8–10.5)
WBC # BLD: 5.22 K/UL — SIGNIFICANT CHANGE UP (ref 3.8–10.5)
WBC # BLD: 5.83 K/UL — SIGNIFICANT CHANGE UP (ref 3.8–10.5)
WBC # FLD AUTO: 4.95 K/UL — SIGNIFICANT CHANGE UP (ref 3.8–10.5)
WBC # FLD AUTO: 5.22 K/UL — SIGNIFICANT CHANGE UP (ref 3.8–10.5)
WBC # FLD AUTO: 5.83 K/UL — SIGNIFICANT CHANGE UP (ref 3.8–10.5)

## 2024-10-17 PROCEDURE — 99233 SBSQ HOSP IP/OBS HIGH 50: CPT

## 2024-10-17 PROCEDURE — 99222 1ST HOSP IP/OBS MODERATE 55: CPT | Mod: FS,GC

## 2024-10-17 RX ORDER — POLYETHYLENE GLYCOL-3350 AND ELECTROLYTES 236; 6.74; 5.86; 2.97; 22.74 G/274.31G; G/274.31G; G/274.31G; G/274.31G; G/274.31G
4000 POWDER, FOR SOLUTION ORAL ONCE
Refills: 0 | Status: COMPLETED | OUTPATIENT
Start: 2024-10-17 | End: 2024-10-17

## 2024-10-17 RX ADMIN — Medication 40 MILLIGRAM(S): at 05:15

## 2024-10-17 RX ADMIN — POLYETHYLENE GLYCOL-3350 AND ELECTROLYTES 4000 MILLILITER(S): 236; 6.74; 5.86; 2.97; 22.74 POWDER, FOR SOLUTION ORAL at 17:28

## 2024-10-17 RX ADMIN — SODIUM CHLORIDE 90 MILLILITER(S): 9 INJECTION, SOLUTION INTRAVENOUS at 00:35

## 2024-10-17 RX ADMIN — Medication 40 MILLIGRAM(S): at 17:29

## 2024-10-18 ENCOUNTER — TRANSCRIPTION ENCOUNTER (OUTPATIENT)
Age: 74
End: 2024-10-18

## 2024-10-18 LAB
ANION GAP SERPL CALC-SCNC: 7 MMOL/L — SIGNIFICANT CHANGE UP (ref 5–17)
BUN SERPL-MCNC: 9 MG/DL — SIGNIFICANT CHANGE UP (ref 7–18)
CALCIUM SERPL-MCNC: 8.6 MG/DL — SIGNIFICANT CHANGE UP (ref 8.4–10.5)
CHLORIDE SERPL-SCNC: 109 MMOL/L — HIGH (ref 96–108)
CO2 SERPL-SCNC: 25 MMOL/L — SIGNIFICANT CHANGE UP (ref 22–31)
CREAT SERPL-MCNC: 0.7 MG/DL — SIGNIFICANT CHANGE UP (ref 0.5–1.3)
EGFR: 92 ML/MIN/1.73M2 — SIGNIFICANT CHANGE UP
EGFR: 92 ML/MIN/1.73M2 — SIGNIFICANT CHANGE UP
GLUCOSE BLDC GLUCOMTR-MCNC: 106 MG/DL — HIGH (ref 70–99)
GLUCOSE BLDC GLUCOMTR-MCNC: 176 MG/DL — HIGH (ref 70–99)
GLUCOSE BLDC GLUCOMTR-MCNC: 183 MG/DL — HIGH (ref 70–99)
GLUCOSE BLDC GLUCOMTR-MCNC: 74 MG/DL — SIGNIFICANT CHANGE UP (ref 70–99)
GLUCOSE BLDC GLUCOMTR-MCNC: 99 MG/DL — SIGNIFICANT CHANGE UP (ref 70–99)
GLUCOSE SERPL-MCNC: 109 MG/DL — HIGH (ref 70–99)
HCT VFR BLD CALC: 22.8 % — LOW (ref 34.5–45)
HCT VFR BLD CALC: 28.6 % — LOW (ref 34.5–45)
HGB BLD-MCNC: 7.3 G/DL — LOW (ref 11.5–15.5)
HGB BLD-MCNC: 9.3 G/DL — LOW (ref 11.5–15.5)
MCHC RBC-ENTMCNC: 29.7 PG — SIGNIFICANT CHANGE UP (ref 27–34)
MCHC RBC-ENTMCNC: 30 PG — SIGNIFICANT CHANGE UP (ref 27–34)
MCHC RBC-ENTMCNC: 32 GM/DL — SIGNIFICANT CHANGE UP (ref 32–36)
MCHC RBC-ENTMCNC: 32.5 GM/DL — SIGNIFICANT CHANGE UP (ref 32–36)
MCV RBC AUTO: 92.3 FL — SIGNIFICANT CHANGE UP (ref 80–100)
MCV RBC AUTO: 92.7 FL — SIGNIFICANT CHANGE UP (ref 80–100)
NRBC # BLD: 0 /100 WBCS — SIGNIFICANT CHANGE UP (ref 0–0)
NRBC # BLD: 0 /100 WBCS — SIGNIFICANT CHANGE UP (ref 0–0)
NRBC BLD-RTO: 0 /100 WBCS — SIGNIFICANT CHANGE UP (ref 0–0)
NRBC BLD-RTO: 0 /100 WBCS — SIGNIFICANT CHANGE UP (ref 0–0)
PLATELET # BLD AUTO: 298 K/UL — SIGNIFICANT CHANGE UP (ref 150–400)
PLATELET # BLD AUTO: 352 K/UL — SIGNIFICANT CHANGE UP (ref 150–400)
POTASSIUM SERPL-MCNC: 3.6 MMOL/L — SIGNIFICANT CHANGE UP (ref 3.5–5.3)
POTASSIUM SERPL-SCNC: 3.6 MMOL/L — SIGNIFICANT CHANGE UP (ref 3.5–5.3)
RBC # BLD: 2.46 M/UL — LOW (ref 3.8–5.2)
RBC # BLD: 3.1 M/UL — LOW (ref 3.8–5.2)
RBC # FLD: 19.9 % — HIGH (ref 10.3–14.5)
RBC # FLD: 20.6 % — HIGH (ref 10.3–14.5)
SODIUM SERPL-SCNC: 141 MMOL/L — SIGNIFICANT CHANGE UP (ref 135–145)
WBC # BLD: 4.24 K/UL — SIGNIFICANT CHANGE UP (ref 3.8–10.5)
WBC # BLD: 4.7 K/UL — SIGNIFICANT CHANGE UP (ref 3.8–10.5)
WBC # FLD AUTO: 4.24 K/UL — SIGNIFICANT CHANGE UP (ref 3.8–10.5)
WBC # FLD AUTO: 4.7 K/UL — SIGNIFICANT CHANGE UP (ref 3.8–10.5)

## 2024-10-18 PROCEDURE — 99233 SBSQ HOSP IP/OBS HIGH 50: CPT

## 2024-10-18 PROCEDURE — 45378 DIAGNOSTIC COLONOSCOPY: CPT

## 2024-10-18 RX ORDER — SODIUM CHLORIDE 9 G/1000ML
1000 INJECTION, SOLUTION INTRAVENOUS
Refills: 0 | Status: DISCONTINUED | OUTPATIENT
Start: 2024-10-18 | End: 2024-10-18

## 2024-10-18 RX ORDER — INSULIN LISPRO 100 U/ML
INJECTION, SOLUTION INTRAVENOUS; SUBCUTANEOUS
Refills: 0 | Status: DISCONTINUED | OUTPATIENT
Start: 2024-10-18 | End: 2024-10-19

## 2024-10-18 RX ADMIN — Medication 40 MILLIGRAM(S): at 05:11

## 2024-10-18 RX ADMIN — INSULIN LISPRO 1: 100 INJECTION, SOLUTION INTRAVENOUS; SUBCUTANEOUS at 17:30

## 2024-10-18 RX ADMIN — SODIUM CHLORIDE 75 MILLILITER(S): 9 INJECTION, SOLUTION INTRAVENOUS at 00:32

## 2024-10-19 ENCOUNTER — TRANSCRIPTION ENCOUNTER (OUTPATIENT)
Age: 74
End: 2024-10-19

## 2024-10-19 VITALS
TEMPERATURE: 98 F | RESPIRATION RATE: 18 BRPM | DIASTOLIC BLOOD PRESSURE: 81 MMHG | OXYGEN SATURATION: 98 % | HEART RATE: 81 BPM | SYSTOLIC BLOOD PRESSURE: 156 MMHG

## 2024-10-19 DIAGNOSIS — K92.2 GASTROINTESTINAL HEMORRHAGE, UNSPECIFIED: ICD-10-CM

## 2024-10-19 LAB
ANION GAP SERPL CALC-SCNC: 6 MMOL/L — SIGNIFICANT CHANGE UP (ref 5–17)
BUN SERPL-MCNC: 14 MG/DL — SIGNIFICANT CHANGE UP (ref 7–18)
CALCIUM SERPL-MCNC: 9 MG/DL — SIGNIFICANT CHANGE UP (ref 8.4–10.5)
CHLORIDE SERPL-SCNC: 109 MMOL/L — HIGH (ref 96–108)
CO2 SERPL-SCNC: 25 MMOL/L — SIGNIFICANT CHANGE UP (ref 22–31)
CREAT SERPL-MCNC: 0.92 MG/DL — SIGNIFICANT CHANGE UP (ref 0.5–1.3)
EGFR: 67 ML/MIN/1.73M2 — SIGNIFICANT CHANGE UP
EGFR: 67 ML/MIN/1.73M2 — SIGNIFICANT CHANGE UP
GLUCOSE BLDC GLUCOMTR-MCNC: 143 MG/DL — HIGH (ref 70–99)
GLUCOSE BLDC GLUCOMTR-MCNC: 153 MG/DL — HIGH (ref 70–99)
GLUCOSE SERPL-MCNC: 186 MG/DL — HIGH (ref 70–99)
HCT VFR BLD CALC: 30 % — LOW (ref 34.5–45)
HGB BLD-MCNC: 9.7 G/DL — LOW (ref 11.5–15.5)
MCHC RBC-ENTMCNC: 30.2 PG — SIGNIFICANT CHANGE UP (ref 27–34)
MCHC RBC-ENTMCNC: 32.3 GM/DL — SIGNIFICANT CHANGE UP (ref 32–36)
MCV RBC AUTO: 93.5 FL — SIGNIFICANT CHANGE UP (ref 80–100)
NRBC # BLD: 0 /100 WBCS — SIGNIFICANT CHANGE UP (ref 0–0)
NRBC BLD-RTO: 0 /100 WBCS — SIGNIFICANT CHANGE UP (ref 0–0)
PLATELET # BLD AUTO: 366 K/UL — SIGNIFICANT CHANGE UP (ref 150–400)
POTASSIUM SERPL-MCNC: 4.3 MMOL/L — SIGNIFICANT CHANGE UP (ref 3.5–5.3)
POTASSIUM SERPL-SCNC: 4.3 MMOL/L — SIGNIFICANT CHANGE UP (ref 3.5–5.3)
RBC # BLD: 3.21 M/UL — LOW (ref 3.8–5.2)
RBC # FLD: 19.6 % — HIGH (ref 10.3–14.5)
SODIUM SERPL-SCNC: 140 MMOL/L — SIGNIFICANT CHANGE UP (ref 135–145)
WBC # BLD: 5.53 K/UL — SIGNIFICANT CHANGE UP (ref 3.8–10.5)
WBC # FLD AUTO: 5.53 K/UL — SIGNIFICANT CHANGE UP (ref 3.8–10.5)

## 2024-10-19 PROCEDURE — 99239 HOSP IP/OBS DSCHRG MGMT >30: CPT

## 2024-10-19 RX ADMIN — Medication 40 MILLIGRAM(S): at 06:26

## 2024-10-19 RX ADMIN — INSULIN LISPRO 1: 100 INJECTION, SOLUTION INTRAVENOUS; SUBCUTANEOUS at 08:00

## 2024-10-22 ENCOUNTER — NON-APPOINTMENT (OUTPATIENT)
Age: 74
End: 2024-10-22

## 2024-10-23 ENCOUNTER — APPOINTMENT (OUTPATIENT)
Dept: INTERNAL MEDICINE | Facility: CLINIC | Age: 74
End: 2024-10-23

## 2024-10-29 PROBLEM — I10 ESSENTIAL (PRIMARY) HYPERTENSION: Chronic | Status: ACTIVE | Noted: 2024-10-16

## 2024-10-31 ENCOUNTER — TRANSCRIPTION ENCOUNTER (OUTPATIENT)
Age: 74
End: 2024-10-31

## 2024-11-04 ENCOUNTER — TRANSCRIPTION ENCOUNTER (OUTPATIENT)
Age: 74
End: 2024-11-04

## 2024-11-06 ENCOUNTER — TRANSCRIPTION ENCOUNTER (OUTPATIENT)
Age: 74
End: 2024-11-06

## 2024-11-06 ENCOUNTER — APPOINTMENT (OUTPATIENT)
Dept: INTERNAL MEDICINE | Facility: CLINIC | Age: 74
End: 2024-11-06
Payer: MEDICARE

## 2024-11-06 VITALS
SYSTOLIC BLOOD PRESSURE: 163 MMHG | WEIGHT: 197 LBS | HEART RATE: 83 BPM | OXYGEN SATURATION: 98 % | DIASTOLIC BLOOD PRESSURE: 78 MMHG | HEIGHT: 61 IN | BODY MASS INDEX: 37.19 KG/M2

## 2024-11-06 DIAGNOSIS — E11.9 TYPE 2 DIABETES MELLITUS W/OUT COMPLICATIONS: ICD-10-CM

## 2024-11-06 DIAGNOSIS — K92.2 GASTROINTESTINAL HEMORRHAGE, UNSPECIFIED: ICD-10-CM

## 2024-11-06 DIAGNOSIS — K62.5 HEMORRHAGE OF ANUS AND RECTUM: ICD-10-CM

## 2024-11-06 PROCEDURE — 36415 COLL VENOUS BLD VENIPUNCTURE: CPT

## 2024-11-06 PROCEDURE — 99213 OFFICE O/P EST LOW 20 MIN: CPT

## 2024-11-06 PROCEDURE — G2211 COMPLEX E/M VISIT ADD ON: CPT

## 2024-11-06 RX ORDER — GLIMEPIRIDE 2 MG/1
2 TABLET ORAL TWICE DAILY
Refills: 0 | Status: ACTIVE | COMMUNITY

## 2024-11-07 ENCOUNTER — APPOINTMENT (OUTPATIENT)
Dept: CT IMAGING | Facility: CLINIC | Age: 74
End: 2024-11-07

## 2024-11-07 ENCOUNTER — NON-APPOINTMENT (OUTPATIENT)
Age: 74
End: 2024-11-07

## 2024-11-07 LAB
ESTIMATED AVERAGE GLUCOSE: 137 MG/DL
HBA1C MFR BLD HPLC: 6.4 %

## 2024-11-07 PROCEDURE — 74177 CT ABD & PELVIS W/CONTRAST: CPT

## 2024-11-08 ENCOUNTER — TRANSCRIPTION ENCOUNTER (OUTPATIENT)
Age: 74
End: 2024-11-08

## 2024-11-12 ENCOUNTER — APPOINTMENT (OUTPATIENT)
Dept: RADIATION ONCOLOGY | Facility: CLINIC | Age: 74
End: 2024-11-12

## 2024-11-12 ENCOUNTER — APPOINTMENT (OUTPATIENT)
Dept: GASTROENTEROLOGY | Facility: CLINIC | Age: 74
End: 2024-11-12
Payer: MEDICARE

## 2024-11-12 VITALS
HEART RATE: 92 BPM | DIASTOLIC BLOOD PRESSURE: 87 MMHG | BODY MASS INDEX: 37.57 KG/M2 | WEIGHT: 199 LBS | SYSTOLIC BLOOD PRESSURE: 174 MMHG | TEMPERATURE: 97.2 F | OXYGEN SATURATION: 96 % | HEIGHT: 61 IN

## 2024-11-12 DIAGNOSIS — C54.1 MALIGNANT NEOPLASM OF ENDOMETRIUM: ICD-10-CM

## 2024-11-12 PROCEDURE — 99213 OFFICE O/P EST LOW 20 MIN: CPT

## 2024-11-12 PROCEDURE — 77290 THER RAD SIMULAJ FIELD CPLX: CPT | Mod: 26

## 2024-11-12 PROCEDURE — 77263 THER RADIOLOGY TX PLNG CPLX: CPT

## 2024-11-12 PROCEDURE — ZZZZZ: CPT

## 2024-11-25 PROCEDURE — 77295 3-D RADIOTHERAPY PLAN: CPT | Mod: 26

## 2024-11-26 PROCEDURE — 36415 COLL VENOUS BLD VENIPUNCTURE: CPT

## 2024-11-26 PROCEDURE — 85025 COMPLETE CBC W/AUTO DIFF WBC: CPT

## 2024-11-26 PROCEDURE — 83540 ASSAY OF IRON: CPT

## 2024-11-26 PROCEDURE — 82728 ASSAY OF FERRITIN: CPT

## 2024-11-26 PROCEDURE — 80053 COMPREHEN METABOLIC PANEL: CPT

## 2024-11-26 PROCEDURE — 86901 BLOOD TYPING SEROLOGIC RH(D): CPT

## 2024-11-26 PROCEDURE — 85045 AUTOMATED RETICULOCYTE COUNT: CPT

## 2024-11-26 PROCEDURE — 83550 IRON BINDING TEST: CPT

## 2024-11-26 PROCEDURE — 84100 ASSAY OF PHOSPHORUS: CPT

## 2024-11-26 PROCEDURE — 85027 COMPLETE CBC AUTOMATED: CPT

## 2024-11-26 PROCEDURE — 83735 ASSAY OF MAGNESIUM: CPT

## 2024-11-26 PROCEDURE — 82962 GLUCOSE BLOOD TEST: CPT

## 2024-11-26 PROCEDURE — 36430 TRANSFUSION BLD/BLD COMPNT: CPT

## 2024-11-26 PROCEDURE — 86900 BLOOD TYPING SEROLOGIC ABO: CPT

## 2024-11-26 PROCEDURE — 86923 COMPATIBILITY TEST ELECTRIC: CPT

## 2024-11-26 PROCEDURE — 80048 BASIC METABOLIC PNL TOTAL CA: CPT

## 2024-11-26 PROCEDURE — 83615 LACTATE (LD) (LDH) ENZYME: CPT

## 2024-11-26 PROCEDURE — 93005 ELECTROCARDIOGRAM TRACING: CPT

## 2024-11-26 PROCEDURE — 83036 HEMOGLOBIN GLYCOSYLATED A1C: CPT

## 2024-11-26 PROCEDURE — 83010 ASSAY OF HAPTOGLOBIN QUANT: CPT

## 2024-11-26 PROCEDURE — 86850 RBC ANTIBODY SCREEN: CPT

## 2024-11-26 PROCEDURE — P9040: CPT

## 2024-11-26 PROCEDURE — 99285 EMERGENCY DEPT VISIT HI MDM: CPT

## 2024-11-27 PROCEDURE — 77770 HDR RDNCL NTRSTL/ICAV BRCHTX: CPT | Mod: 26

## 2024-11-27 PROCEDURE — 77332 RADIATION TREATMENT AID(S): CPT | Mod: 26

## 2024-11-27 PROCEDURE — 57156 INS VAG BRACHYTX DEVICE: CPT

## 2024-12-03 PROCEDURE — 77770 HDR RDNCL NTRSTL/ICAV BRCHTX: CPT | Mod: 26

## 2024-12-03 PROCEDURE — 57156 INS VAG BRACHYTX DEVICE: CPT

## 2024-12-03 PROCEDURE — 77332 RADIATION TREATMENT AID(S): CPT | Mod: 26

## 2024-12-06 PROCEDURE — 57156 INS VAG BRACHYTX DEVICE: CPT

## 2024-12-06 PROCEDURE — 77770 HDR RDNCL NTRSTL/ICAV BRCHTX: CPT | Mod: 26

## 2024-12-06 PROCEDURE — 77332 RADIATION TREATMENT AID(S): CPT | Mod: 26

## 2024-12-11 ENCOUNTER — NON-APPOINTMENT (OUTPATIENT)
Age: 74
End: 2024-12-11

## 2024-12-27 ENCOUNTER — APPOINTMENT (OUTPATIENT)
Dept: GYNECOLOGIC ONCOLOGY | Facility: CLINIC | Age: 74
End: 2024-12-27

## 2024-12-27 VITALS
RESPIRATION RATE: 16 BRPM | BODY MASS INDEX: 37.57 KG/M2 | HEIGHT: 61 IN | WEIGHT: 199 LBS | SYSTOLIC BLOOD PRESSURE: 190 MMHG | DIASTOLIC BLOOD PRESSURE: 93 MMHG | HEART RATE: 85 BPM | OXYGEN SATURATION: 95 %

## 2024-12-27 DIAGNOSIS — R32 UNSPECIFIED URINARY INCONTINENCE: ICD-10-CM

## 2024-12-27 DIAGNOSIS — C54.1 MALIGNANT NEOPLASM OF ENDOMETRIUM: ICD-10-CM

## 2024-12-27 PROCEDURE — 99459 PELVIC EXAMINATION: CPT

## 2024-12-27 PROCEDURE — 99214 OFFICE O/P EST MOD 30 MIN: CPT

## 2024-12-28 LAB — CANCER AG125 SERPL-ACNC: 5 U/ML

## 2025-01-15 ENCOUNTER — NON-APPOINTMENT (OUTPATIENT)
Age: 75
End: 2025-01-15

## 2025-02-04 ENCOUNTER — APPOINTMENT (OUTPATIENT)
Dept: GASTROENTEROLOGY | Facility: CLINIC | Age: 75
End: 2025-02-04
Payer: MEDICARE

## 2025-02-04 VITALS
WEIGHT: 190 LBS | SYSTOLIC BLOOD PRESSURE: 157 MMHG | OXYGEN SATURATION: 97 % | DIASTOLIC BLOOD PRESSURE: 76 MMHG | HEART RATE: 90 BPM | TEMPERATURE: 96.6 F | HEIGHT: 61 IN | BODY MASS INDEX: 35.87 KG/M2

## 2025-02-04 DIAGNOSIS — D50.9 IRON DEFICIENCY ANEMIA, UNSPECIFIED: ICD-10-CM

## 2025-02-04 PROCEDURE — 99213 OFFICE O/P EST LOW 20 MIN: CPT

## 2025-02-04 RX ORDER — POLYETHYLENE GLYCOL 3350 17 G/17G
17 POWDER, FOR SOLUTION ORAL
Qty: 2 | Refills: 0 | Status: ACTIVE | COMMUNITY
Start: 2025-02-04 | End: 1900-01-01

## 2025-02-06 ENCOUNTER — RX RENEWAL (OUTPATIENT)
Age: 75
End: 2025-02-06

## 2025-02-17 ENCOUNTER — APPOINTMENT (OUTPATIENT)
Dept: INTERNAL MEDICINE | Facility: CLINIC | Age: 75
End: 2025-02-17

## 2025-02-18 ENCOUNTER — APPOINTMENT (OUTPATIENT)
Age: 75
End: 2025-02-18
Payer: MEDICARE

## 2025-02-18 ENCOUNTER — APPOINTMENT (OUTPATIENT)
Dept: RADIATION ONCOLOGY | Facility: CLINIC | Age: 75
End: 2025-02-18
Payer: MEDICARE

## 2025-02-18 ENCOUNTER — NON-APPOINTMENT (OUTPATIENT)
Age: 75
End: 2025-02-18

## 2025-02-18 VITALS
HEART RATE: 84 BPM | SYSTOLIC BLOOD PRESSURE: 184 MMHG | DIASTOLIC BLOOD PRESSURE: 80 MMHG | OXYGEN SATURATION: 95 % | HEIGHT: 61 IN | WEIGHT: 204 LBS | BODY MASS INDEX: 38.51 KG/M2

## 2025-02-18 VITALS
DIASTOLIC BLOOD PRESSURE: 76 MMHG | RESPIRATION RATE: 17 BRPM | TEMPERATURE: 95.7 F | OXYGEN SATURATION: 99 % | WEIGHT: 204.81 LBS | HEART RATE: 91 BPM | SYSTOLIC BLOOD PRESSURE: 172 MMHG | BODY MASS INDEX: 38.67 KG/M2 | HEIGHT: 61 IN

## 2025-02-18 DIAGNOSIS — M19.011 PRIMARY OSTEOARTHRITIS, RIGHT SHOULDER: ICD-10-CM

## 2025-02-18 DIAGNOSIS — Z92.3 PERSONAL HISTORY OF IRRADIATION: ICD-10-CM

## 2025-02-18 DIAGNOSIS — C54.1 MALIGNANT NEOPLASM OF ENDOMETRIUM: ICD-10-CM

## 2025-02-18 DIAGNOSIS — M25.311 OTHER INSTABILITY, RIGHT SHOULDER: ICD-10-CM

## 2025-02-18 DIAGNOSIS — M47.812 SPONDYLOSIS W/OUT MYELOPATHY OR RADICULOPATHY, CERVICAL REGION: ICD-10-CM

## 2025-02-18 PROCEDURE — 99204 OFFICE O/P NEW MOD 45 MIN: CPT

## 2025-02-18 PROCEDURE — 99214 OFFICE O/P EST MOD 30 MIN: CPT

## 2025-02-18 RX ORDER — LATANOPROST/PF 0.005 %
0.01 DROPS OPHTHALMIC (EYE)
Qty: 8 | Refills: 0 | Status: ACTIVE | COMMUNITY
Start: 2024-05-09

## 2025-02-18 RX ORDER — DICLOFENAC SODIUM 50 MG/1
50 TABLET, DELAYED RELEASE ORAL
Qty: 28 | Refills: 1 | Status: ACTIVE | COMMUNITY
Start: 2025-02-18 | End: 1900-01-01

## 2025-02-20 PROBLEM — Z92.3 HISTORY OF BRACHYTHERAPY: Status: ACTIVE | Noted: 2025-02-20

## 2025-02-28 ENCOUNTER — APPOINTMENT (OUTPATIENT)
Age: 75
End: 2025-02-28

## 2025-03-04 ENCOUNTER — INPATIENT (INPATIENT)
Facility: HOSPITAL | Age: 75
LOS: 2 days | Discharge: HOME | DRG: 379 | End: 2025-03-07
Attending: INTERNAL MEDICINE | Admitting: INTERNAL MEDICINE
Payer: MEDICARE

## 2025-03-04 VITALS
TEMPERATURE: 98 F | DIASTOLIC BLOOD PRESSURE: 79 MMHG | OXYGEN SATURATION: 99 % | RESPIRATION RATE: 14 BRPM | HEART RATE: 72 BPM | WEIGHT: 197.98 LBS | SYSTOLIC BLOOD PRESSURE: 131 MMHG

## 2025-03-04 DIAGNOSIS — K92.2 GASTROINTESTINAL HEMORRHAGE, UNSPECIFIED: ICD-10-CM

## 2025-03-04 DIAGNOSIS — K92.1 MELENA: ICD-10-CM

## 2025-03-04 DIAGNOSIS — E87.6 HYPOKALEMIA: ICD-10-CM

## 2025-03-04 DIAGNOSIS — Z98.890 OTHER SPECIFIED POSTPROCEDURAL STATES: Chronic | ICD-10-CM

## 2025-03-04 DIAGNOSIS — C55 MALIGNANT NEOPLASM OF UTERUS, PART UNSPECIFIED: ICD-10-CM

## 2025-03-04 DIAGNOSIS — D64.9 ANEMIA, UNSPECIFIED: ICD-10-CM

## 2025-03-04 DIAGNOSIS — Z29.9 ENCOUNTER FOR PROPHYLACTIC MEASURES, UNSPECIFIED: ICD-10-CM

## 2025-03-04 DIAGNOSIS — R55 SYNCOPE AND COLLAPSE: ICD-10-CM

## 2025-03-04 DIAGNOSIS — I10 ESSENTIAL (PRIMARY) HYPERTENSION: ICD-10-CM

## 2025-03-04 DIAGNOSIS — Z98.49 CATARACT EXTRACTION STATUS, UNSPECIFIED EYE: Chronic | ICD-10-CM

## 2025-03-04 DIAGNOSIS — E11.9 TYPE 2 DIABETES MELLITUS WITHOUT COMPLICATIONS: ICD-10-CM

## 2025-03-04 LAB
ALBUMIN SERPL ELPH-MCNC: 2.9 G/DL — LOW (ref 3.5–5)
ALP SERPL-CCNC: 67 U/L — SIGNIFICANT CHANGE UP (ref 40–120)
ALT FLD-CCNC: 16 U/L DA — SIGNIFICANT CHANGE UP (ref 10–60)
ANION GAP SERPL CALC-SCNC: 2 MMOL/L — LOW (ref 5–17)
ANISOCYTOSIS BLD QL: SLIGHT — SIGNIFICANT CHANGE UP
APPEARANCE UR: CLEAR — SIGNIFICANT CHANGE UP
AST SERPL-CCNC: 14 U/L — SIGNIFICANT CHANGE UP (ref 10–40)
BACTERIA # UR AUTO: ABNORMAL /HPF
BASOPHILS # BLD AUTO: 0.01 K/UL — SIGNIFICANT CHANGE UP (ref 0–0.2)
BASOPHILS NFR BLD AUTO: 0.1 % — SIGNIFICANT CHANGE UP (ref 0–2)
BILIRUB SERPL-MCNC: 0.1 MG/DL — LOW (ref 0.2–1.2)
BILIRUB UR-MCNC: NEGATIVE — SIGNIFICANT CHANGE UP
BLD GP AB SCN SERPL QL: SIGNIFICANT CHANGE UP
BUN SERPL-MCNC: 55 MG/DL — HIGH (ref 7–18)
CALCIUM SERPL-MCNC: 8.7 MG/DL — SIGNIFICANT CHANGE UP (ref 8.4–10.5)
CHLORIDE SERPL-SCNC: 112 MMOL/L — HIGH (ref 96–108)
CO2 SERPL-SCNC: 26 MMOL/L — SIGNIFICANT CHANGE UP (ref 22–31)
COLOR SPEC: YELLOW — SIGNIFICANT CHANGE UP
CREAT SERPL-MCNC: 1.5 MG/DL — HIGH (ref 0.5–1.3)
DIFF PNL FLD: ABNORMAL
EGFR: 37 ML/MIN/1.73M2 — LOW
EGFR: 37 ML/MIN/1.73M2 — LOW
EOSINOPHIL # BLD AUTO: 0 K/UL — SIGNIFICANT CHANGE UP (ref 0–0.5)
EOSINOPHIL NFR BLD AUTO: 0 % — SIGNIFICANT CHANGE UP (ref 0–6)
EPI CELLS # UR: PRESENT
GLUCOSE BLDC GLUCOMTR-MCNC: 145 MG/DL — HIGH (ref 70–99)
GLUCOSE SERPL-MCNC: 249 MG/DL — HIGH (ref 70–99)
GLUCOSE UR QL: NEGATIVE MG/DL — SIGNIFICANT CHANGE UP
HCT VFR BLD CALC: 17 % — CRITICAL LOW (ref 34.5–45)
HCT VFR BLD CALC: 22 % — LOW (ref 34.5–45)
HGB BLD-MCNC: 5.2 G/DL — CRITICAL LOW (ref 11.5–15.5)
HGB BLD-MCNC: 6.9 G/DL — CRITICAL LOW (ref 11.5–15.5)
IMM GRANULOCYTES NFR BLD AUTO: 0.9 % — SIGNIFICANT CHANGE UP (ref 0–0.9)
KETONES UR-MCNC: NEGATIVE MG/DL — SIGNIFICANT CHANGE UP
LEUKOCYTE ESTERASE UR-ACNC: ABNORMAL
LYMPHOCYTES # BLD AUTO: 1.55 K/UL — SIGNIFICANT CHANGE UP (ref 1–3.3)
LYMPHOCYTES # BLD AUTO: 16.8 % — SIGNIFICANT CHANGE UP (ref 13–44)
MANUAL SMEAR VERIFICATION: SIGNIFICANT CHANGE UP
MCHC RBC-ENTMCNC: 28.7 PG — SIGNIFICANT CHANGE UP (ref 27–34)
MCHC RBC-ENTMCNC: 29.4 PG — SIGNIFICANT CHANGE UP (ref 27–34)
MCHC RBC-ENTMCNC: 30.6 G/DL — LOW (ref 32–36)
MCHC RBC-ENTMCNC: 31.4 G/DL — LOW (ref 32–36)
MCV RBC AUTO: 93.6 FL — SIGNIFICANT CHANGE UP (ref 80–100)
MCV RBC AUTO: 93.9 FL — SIGNIFICANT CHANGE UP (ref 80–100)
MICROCYTES BLD QL: SLIGHT — SIGNIFICANT CHANGE UP
MONOCYTES # BLD AUTO: 0.32 K/UL — SIGNIFICANT CHANGE UP (ref 0–0.9)
MONOCYTES NFR BLD AUTO: 3.5 % — SIGNIFICANT CHANGE UP (ref 2–14)
NEUTROPHILS # BLD AUTO: 7.28 K/UL — SIGNIFICANT CHANGE UP (ref 1.8–7.4)
NEUTROPHILS NFR BLD AUTO: 78.7 % — HIGH (ref 43–77)
NITRITE UR-MCNC: NEGATIVE — SIGNIFICANT CHANGE UP
NRBC BLD AUTO-RTO: 0 /100 WBCS — SIGNIFICANT CHANGE UP (ref 0–0)
NRBC BLD AUTO-RTO: 0 /100 WBCS — SIGNIFICANT CHANGE UP (ref 0–0)
PH UR: 5.5 — SIGNIFICANT CHANGE UP (ref 5–8)
PLAT MORPH BLD: NORMAL — SIGNIFICANT CHANGE UP
PLATELET # BLD AUTO: 144 K/UL — LOW (ref 150–400)
PLATELET # BLD AUTO: 147 K/UL — LOW (ref 150–400)
POIKILOCYTOSIS BLD QL AUTO: SLIGHT — SIGNIFICANT CHANGE UP
POTASSIUM SERPL-MCNC: 5.2 MMOL/L — SIGNIFICANT CHANGE UP (ref 3.5–5.3)
POTASSIUM SERPL-SCNC: 5.2 MMOL/L — SIGNIFICANT CHANGE UP (ref 3.5–5.3)
PROT SERPL-MCNC: 5.9 G/DL — LOW (ref 6–8.3)
PROT UR-MCNC: NEGATIVE MG/DL — SIGNIFICANT CHANGE UP
RBC # BLD: 1.81 M/UL — LOW (ref 3.8–5.2)
RBC # BLD: 2.35 M/UL — LOW (ref 3.8–5.2)
RBC # FLD: 13.7 % — SIGNIFICANT CHANGE UP (ref 10.3–14.5)
RBC # FLD: 14.1 % — SIGNIFICANT CHANGE UP (ref 10.3–14.5)
RBC BLD AUTO: ABNORMAL
RBC CASTS # UR COMP ASSIST: 15 /HPF — HIGH (ref 0–4)
SODIUM SERPL-SCNC: 140 MMOL/L — SIGNIFICANT CHANGE UP (ref 135–145)
SP GR SPEC: 1.01 — SIGNIFICANT CHANGE UP (ref 1–1.03)
UROBILINOGEN FLD QL: 0.2 MG/DL — SIGNIFICANT CHANGE UP (ref 0.2–1)
WBC # BLD: 9.24 K/UL — SIGNIFICANT CHANGE UP (ref 3.8–10.5)
WBC # BLD: 9.88 K/UL — SIGNIFICANT CHANGE UP (ref 3.8–10.5)
WBC # FLD AUTO: 9.24 K/UL — SIGNIFICANT CHANGE UP (ref 3.8–10.5)
WBC # FLD AUTO: 9.88 K/UL — SIGNIFICANT CHANGE UP (ref 3.8–10.5)
WBC UR QL: 26 /HPF — HIGH (ref 0–5)

## 2025-03-04 PROCEDURE — 99285 EMERGENCY DEPT VISIT HI MDM: CPT

## 2025-03-04 PROCEDURE — 70450 CT HEAD/BRAIN W/O DYE: CPT | Mod: 26

## 2025-03-04 PROCEDURE — 99221 1ST HOSP IP/OBS SF/LOW 40: CPT

## 2025-03-04 RX ORDER — INSULIN LISPRO 100 U/ML
INJECTION, SOLUTION INTRAVENOUS; SUBCUTANEOUS AT BEDTIME
Refills: 0 | Status: DISCONTINUED | OUTPATIENT
Start: 2025-03-04 | End: 2025-03-07

## 2025-03-04 RX ORDER — ACETAMINOPHEN 500 MG/5ML
650 LIQUID (ML) ORAL EVERY 6 HOURS
Refills: 0 | Status: DISCONTINUED | OUTPATIENT
Start: 2025-03-04 | End: 2025-03-07

## 2025-03-04 RX ORDER — LOSARTAN POTASSIUM 100 MG/1
1 TABLET, FILM COATED ORAL
Refills: 0 | DISCHARGE

## 2025-03-04 RX ORDER — MELATONIN 5 MG
3 TABLET ORAL AT BEDTIME
Refills: 0 | Status: DISCONTINUED | OUTPATIENT
Start: 2025-03-04 | End: 2025-03-07

## 2025-03-04 RX ORDER — ORAL SEMAGLUTIDE 14 MG/1
0.5 TABLET ORAL
Refills: 0 | DISCHARGE

## 2025-03-04 RX ORDER — MAGNESIUM, ALUMINUM HYDROXIDE 200-200 MG
30 TABLET,CHEWABLE ORAL EVERY 4 HOURS
Refills: 0 | Status: DISCONTINUED | OUTPATIENT
Start: 2025-03-04 | End: 2025-03-07

## 2025-03-04 RX ORDER — GRANISETRON HYDROCHLORIDE 1 MG/1
1 TABLET, FILM COATED ORAL
Refills: 0 | DISCHARGE

## 2025-03-04 RX ORDER — DEXAMETHASONE 0.5 MG/1
2 TABLET ORAL
Refills: 0 | DISCHARGE

## 2025-03-04 RX ORDER — INSULIN LISPRO 100 U/ML
INJECTION, SOLUTION INTRAVENOUS; SUBCUTANEOUS
Refills: 0 | Status: DISCONTINUED | OUTPATIENT
Start: 2025-03-04 | End: 2025-03-07

## 2025-03-04 RX ORDER — ONDANSETRON HCL/PF 4 MG/2 ML
4 VIAL (ML) INJECTION EVERY 8 HOURS
Refills: 0 | Status: DISCONTINUED | OUTPATIENT
Start: 2025-03-04 | End: 2025-03-07

## 2025-03-04 RX ORDER — DIPHENHYDRAMINE HCL 12.5MG/5ML
1 ELIXIR ORAL
Refills: 0 | DISCHARGE

## 2025-03-04 RX ORDER — CARBOPLATIN 10 MG/ML
0 INJECTION, SOLUTION INTRAVENOUS
Refills: 0 | DISCHARGE

## 2025-03-04 RX ADMIN — Medication 40 MILLIGRAM(S): at 20:58

## 2025-03-04 NOTE — PATIENT PROFILE ADULT - FALL HARM RISK - HARM RISK INTERVENTIONS

## 2025-03-04 NOTE — ED PROVIDER NOTE - CARE PLAN
Principal Discharge DX:	Blood in stool   1 Principal Discharge DX:	Blood in stool  Secondary Diagnosis:	Anemia

## 2025-03-04 NOTE — ED PROVIDER NOTE - GASTROINTESTINAL, MLM
Abdomen soft, mild tenderness to palpation Abdomen soft, mild tenderness to palpation, bright red blood at anus no visible hemorrhoids

## 2025-03-04 NOTE — H&P ADULT - NSHPPHYSICALEXAM_GEN_ALL_CORE
PHYSICAL EXAMINATION:  GENERAL: NAD  HEAD:  Atraumatic, Normocephalic  NECK: Supple, No JVD, Normal thyroid  CHEST/LUNG: Clear to auscultation. ; No rales, rhonchi, wheezing, or rubs  HEART: Regular rate and rhythm; No murmurs, rubs, or gallops  ABDOMEN: Soft, Nontender, Nondistended; Bowel sounds present  NERVOUS SYSTEM:  Alert & Oriented X3,    EXTREMITIES:  2+ Peripheral Pulses, No clubbing, cyanosis, trace edema b/l LE  SKIN: warm dry    T(C): 36.7 (03-04-25 @ 16:32), Max: 36.7 (03-04-25 @ 13:59)  HR: 96 (03-04-25 @ 16:32) (72 - 96)  BP: 103/63 (03-04-25 @ 16:32) (103/63 - 131/79)  RR: 16 (03-04-25 @ 16:32) (14 - 16)  SpO2: 98% (03-04-25 @ 16:32) (98% - 99%)

## 2025-03-04 NOTE — ED PROVIDER NOTE - CLINICAL SUMMARY MEDICAL DECISION MAKING FREE TEXT BOX
73 y/o F w/ PMHx endometrial cancer s/p hysterectomy and radiation therapy presenting with acute episode of hematochezia. Pt reports similar episodes in the past with insufficient GI workup due to poor visualization from inadequate prep. Required blood transfusion at time. Check CBC. 75 y/o F w/ PMHx endometrial cancer s/p hysterectomy and radiation therapy presenting with acute episode of hematochezia. Pt reports similar episodes in the past with insufficient GI workup due to poor visualization from inadequate prep. Required blood transfusion at time. Check labs, likely prbc, admit for at least serial cbc and Gi eval with Dr Bonilla    likely lower gi bleed and no active severe hemorrhage. benign abd

## 2025-03-04 NOTE — PATIENT PROFILE ADULT - NSPROHMDIABETBLDGLCUSUAL_GEN_A_NUR
----- Message from Kandice Mckeon sent at 3/20/2020  2:09 PM CDT -----  Contact: Self  Type: RX Refill Request    Who Called: Self    Have you contacted your pharmacy: no    Refill or New Rx: refill    RX Name and Strength: zolpidem (AMBIEN) 5 MG Tab    Preferred Pharmacy with phone number: [id number] [address] [phone] (Phone)  [phone] (Fax)        Local or Mail Order: local    Ordering Provider: [place]    Would the patient rather a call back or a response via My [facility]? Call     Best Call Back Number [phone]        
Refill request already sent in another encounter  
unknown

## 2025-03-04 NOTE — H&P ADULT - PROBLEM SELECTOR PLAN 1
Hb on admission  CTA AP not performed due to vance  BUN/SCr <36  trend Hb q 6  monitor for further bleeding  IV PPI 40 BID  GI in AM  NPO

## 2025-03-04 NOTE — H&P ADULT - ASSESSMENT
73F AAOX3, Ambulates with a cane with history significant for HTN, DM, GERD and  endometrial  CA s/p hysterectomy completed chemo/radiation therapy in december presented to the ED after multiple bloody BM yesterday.  Patient admitted for GI bleed, symptomatic anemia and syncope.

## 2025-03-04 NOTE — H&P ADULT - NSICDXPASTMEDICALHX_GEN_ALL_CORE_FT
PAST MEDICAL HISTORY:  Anemia     Chronic GERD     DM (diabetes mellitus)     History of herniated intervertebral disc     HTN (hypertension)     Internal hemorrhoid     Leiomyosarcoma of uterus

## 2025-03-04 NOTE — ED PROVIDER NOTE - PROGRESS NOTE DETAILS
Hemoglobin of 5.5 noted.  Patient already consented for transfusion.  2 units of blood products ordered.  Patient to be admitted

## 2025-03-04 NOTE — H&P ADULT - HISTORY OF PRESENT ILLNESS
73F AAOX3, Ambulates with a cane with history significant for HTN, DM, GERD and  endometrial  CA s/p hysterectomy completed chemo/radiation therapy in december presented to the ED after multiple bloody BM yesterday. Patient presented similarly in  october of last year. Patient had 4 episodes of bloody diarrhea with clots yesterday. Patient endorsing associated fatigue, weakness, abdominal pressure and dizziness over the last several days as well as one episode of syncope while in bed. Patient denies any fever, chills, dizziness ,headache, SOB, cough, chest pain, palpitations, nausea, vomiting,  urinary symptoms, lower extremity pain, or edema. Patient denies recent travel or sick contacts.

## 2025-03-04 NOTE — H&P ADULT - IN ACCORDANCE WITH [place] STATE LAW, WE OFFER EVERY PATIENT A HEPATITIS C TEST. WOULD YOU LIKE TO BE TESTED TODAY?
Opt out Pain Refusal Text: I offered to prescribe pain medication but the patient refused to take this medication.

## 2025-03-04 NOTE — H&P ADULT - PROBLEM SELECTOR PLAN 3
Patient on  oral medications  started on BRANDON, lantus X units, admelog X units, oral meds held  F/u A1c in AM likely 2/2 symptomatic anemia  EKG showing sinus tachy  f/u CTH  f/u UA  f/u orhtostatics  f/u TTE  tele monitoring

## 2025-03-04 NOTE — ED PROVIDER NOTE - OBJECTIVE STATEMENT
73 y/o F w/ PMHx endometrial cancer s/p hysterectomy 2023 and radiation therapy 12/24 presenting with two episodes of blood in stool. Pt reports yesterday she noticed blood in her stool after a bm and then again later that evening blood alone in the toilet. Pt called her oncologist who informed her to report to ED. She has had similar episodes inn 10/24 for which she presented to [facility] ED and was found to have Hgb of 5 requiring blood transfusion. Workup for GI bleed at that time was inadequate due to poor visualization secondary to inadequate prep. Due for colo 4/25. Pt endorses lightheadedness and pale palms similar to her previous episode. She took Vit K during previous episode as well. Pt denies headache, fever, n/v, cp, sob, urinary symptoms or changes in her bowel habits. 73 y/o F w/ PMHx endometrial cancer s/p hysterectomy 2023 and radiation therapy 12/24 presenting with two episodes of blood in stool. Pt reports yesterday she noticed blood in her stool after a bm that was bright red and then again this morning with blood alone in the toilet that was darker. Pt called her oncologist who informed her to report to ED. She has had similar episodes inn 10/24 for which she presented to [facility] ED and was found to have Hgb of 5 requiring blood transfusion. Workup for GI bleed at that time was inadequate due to poor visualization secondary to inadequate prep. Due for colo 4/25. Pt endorses lightheadedness and pale palms similar to her previous episode. She took Vit K during previous episode as well. Pt denies headache, fever, n/v, cp, sob, urinary symptoms or changes in her bowel habits.

## 2025-03-04 NOTE — H&P ADULT - ATTENDING COMMENTS
73F AAOX3, Ambulates with a cane with history significant for HTN, DM, GERD and  endometrial  CA s/p hysterectomy completed chemo/radiation therapy in december presented to the ED after multiple bloody BM yesterday.    T(C): 37.3 (03-04-25 @ 19:20), Max: 37.3 (03-04-25 @ 18:15)  HR: 91 (03-04-25 @ 19:20) (72 - 96)  BP: 110/65 (03-04-25 @ 19:20) (103/63 - 131/79)  RR: 17 (03-04-25 @ 19:20) (14 - 17)  SpO2: 99% (03-04-25 @ 19:20) (98% - 99%)    Reviewed labs and imaging   Acute Blood loss Anemia   Bloody Diarrhea  BAILEY   HTN   DM       GI Loss  Monitor CBC  CT abdomen   HISS   Fluids   GI eval in AM   Keep patient NPO after MN   Hold BP meds

## 2025-03-04 NOTE — H&P ADULT - PROBLEM SELECTOR PLAN 4
Patient on  oral medications  started on BRANDON, lantus X units, admelog X units, oral meds held  F/u A1c in AM 2.8 on admission  repleted in ED  f/u rpt BMP

## 2025-03-04 NOTE — CHART NOTE - NSCHARTNOTEFT_GEN_A_CORE
EVENT:     SUBJECTIVE:    OBJECTIVE:  Vital Signs Last 24 Hrs  T(C): 37.3 (04 Mar 2025 19:20), Max: 37.3 (04 Mar 2025 18:15)  T(F): 99.2 (04 Mar 2025 19:20), Max: 99.2 (04 Mar 2025 19:20)  HR: 91 (04 Mar 2025 19:20) (72 - 96)  BP: 110/65 (04 Mar 2025 19:20) (103/63 - 131/79)  BP(mean): --  RR: 17 (04 Mar 2025 19:20) (14 - 17)  SpO2: 99% (04 Mar 2025 19:20) (98% - 99%)    Parameters below as of 04 Mar 2025 19:20  Patient On (Oxygen Delivery Method): room air        PHYSICAL EXAM:  Neuro: Awake and alert, oriented to person, place, and time  Cardiovascular: + S1, S2, no murmurs, rubs, or bruits  Respiratory: clear to auscultation bilaterally with good air entry   GI: Abdomen soft, non-tender, bowel sounds present   : Non distended;   Skin: warm and dry; no rash      LABS:                        5.2    9.24  )-----------( 147      ( 04 Mar 2025 16:25 )             17.0     03-04    140  |  112[H]  |  55[H]  ----------------------------<  249[H]  5.2   |  26  |  1.50[H]    Ca    8.7      04 Mar 2025 16:25    TPro  5.9[L]  /  Alb  2.9[L]  /  TBili  0.1[L]  /  DBili  x   /  AST  14  /  ALT  16  /  AlkPhos  67  03-04        EKG:   IMAGING:    ASSESSMENT:  HPI:  73F AAOX3, Ambulates with a cane with history significant for HTN, DM, GERD and  endometrial  CA s/p hysterectomy completed chemo/radiation therapy in december presented to the ED after multiple bloody BM yesterday. Patient presented similarly in  october of last year. Patient had 4 episodes of bloody diarrhea with clots yesterday. Patient endorsing associated fatigue, weakness, abdominal pressure and dizziness over the last several days as well as one episode of syncope while in bed. Patient denies any fever, chills, dizziness ,headache, SOB, cough, chest pain, palpitations, nausea, vomiting,  urinary symptoms, lower extremity pain, or edema. Patient denies recent travel or sick contacts.  (04 Mar 2025 18:00)      PLAN:     FOLLOW UP / RESULT: EVENT: Notified by RN, pt with c/o difficulty urinating. Bladder scan revealed approximately 565 ml of urine in the bladder.     HPI:  73F AAOX3, Ambulates with a cane with history significant for HTN, DM, GERD and endometrial CA s/p hysterectomy completed chemo/radiation therapy in December presented to the ED after multiple bloody BM yesterday.  Admitted for GI bleed, symptomatic anemia requiring 2 units of PRBCs and syncope. Hospital course was complicated by urinary retention requiring straight cath.     SUBJECTIVE: Pt seen and examined at bedside, A&Ox3-pleasant in NAD. She endorses that she has been having trouble urinating within the past hour. She is afraid of "passing blood if she attempts."     OBJECTIVE:  Vital Signs Last 24 Hrs  T(C): 37.3 (04 Mar 2025 19:20), Max: 37.3 (04 Mar 2025 18:15)  T(F): 99.2 (04 Mar 2025 19:20), Max: 99.2 (04 Mar 2025 19:20)  HR: 91 (04 Mar 2025 19:20) (72 - 96)  BP: 110/65 (04 Mar 2025 19:20) (103/63 - 131/79)  BP(mean): --  RR: 17 (04 Mar 2025 19:20) (14 - 17)  SpO2: 99% (04 Mar 2025 19:20) (98% - 99%)    Parameters below as of 04 Mar 2025 19:20  Patient On (Oxygen Delivery Method): room air    PHYSICAL EXAM:  Neuro: Awake and alert, oriented to person, place, and time  Cardiovascular: + S1, S2, no murmurs, rubs, or bruits  Respiratory: clear to auscultation bilaterally with good air entry   GI: Abdomen soft, non-tender, bowel sounds present   : Non distended;   Skin: warm and dry; no rash      LABS:                        5.2    9.24  )-----------( 147      ( 04 Mar 2025 16:25 )             17.0     03-04    140  |  112[H]  |  55[H]  ----------------------------<  249[H]  5.2   |  26  |  1.50[H]    Ca    8.7      04 Mar 2025 16:25    TPro  5.9[L]  /  Alb  2.9[L]  /  TBili  0.1[L]  /  DBili  x   /  AST  14  /  ALT  16  /  AlkPhos  67  03-04        EKG:   IMAGING:    ASSESSMENT/PROBLEM: Urinary retention--r/o infectious cause     PLAN:     -Straight cath x 1  -UA with reflex  -Repeat bladder scan in 6-8 hours (see orders)  -Continue current/supportive measures    FOLLOW UP / RESULT:    -UA with reflex  -Repeat bladder scan results EVENT: Notified by RN, pt with c/o difficulty urinating. Bladder scan revealed approximately 565 ml of urine in the bladder.     HPI:  73F AAOX3, Ambulates with a cane with history significant for HTN, DM, GERD and endometrial CA s/p hysterectomy completed chemo/radiation therapy in December presented to the ED after multiple bloody BM yesterday.  Admitted for GI bleed, symptomatic anemia requiring 2 units of PRBCs and syncope. Hospital course was complicated by urinary retention requiring straight cath.     SUBJECTIVE: Pt seen and examined at bedside, A&Ox3-pleasant in NAD. She endorses that she has been having trouble urinating within the past hour. She is afraid of "passing blood if she attempts."  Of note, pt with low grade temp.     OBJECTIVE:  Vital Signs Last 24 Hrs  T(C): 37.3 (04 Mar 2025 19:20), Max: 37.3 (04 Mar 2025 18:15)  T(F): 99.2 (04 Mar 2025 19:20), Max: 99.2 (04 Mar 2025 19:20)  HR: 91 (04 Mar 2025 19:20) (72 - 96)  BP: 110/65 (04 Mar 2025 19:20) (103/63 - 131/79)  BP(mean): --  RR: 17 (04 Mar 2025 19:20) (14 - 17)  SpO2: 99% (04 Mar 2025 19:20) (98% - 99%)    Parameters below as of 04 Mar 2025 19:20  Patient On (Oxygen Delivery Method): room air    PHYSICAL EXAM:  Neuro: Awake and alert, oriented to person, place, and time  Cardiovascular: + S1, S2, no murmurs, rubs, or bruits  Respiratory: clear to auscultation bilaterally with good air entry   GI: Abdomen soft, non-tender, bowel sounds present   : Non distended;   Skin: warm and dry; no rash      LABS:                        5.2    9.24  )-----------( 147      ( 04 Mar 2025 16:25 )             17.0     03-04    140  |  112[H]  |  55[H]  ----------------------------<  249[H]  5.2   |  26  |  1.50[H]    Ca    8.7      04 Mar 2025 16:25    TPro  5.9[L]  /  Alb  2.9[L]  /  TBili  0.1[L]  /  DBili  x   /  AST  14  /  ALT  16  /  AlkPhos  67  03-04        EKG:   IMAGING:    ASSESSMENT/PROBLEM: Urinary retention--r/o infectious cause (given low grade temp)    PLAN:     -Straight cath x 1  -UA with reflex  -Repeat bladder scan in 6-8 hours (see orders)  -Continue current/supportive measures    FOLLOW UP / RESULT:    -UA with reflex  -Repeat bladder scan results EVENT: Notified by RN, pt with c/o difficulty urinating. Bladder scan revealed approximately 565 ml of urine in the bladder.     HPI:  73F AAOX3, Ambulates with a cane with history significant for HTN, DM, GERD and endometrial CA s/p hysterectomy completed chemo/radiation therapy in December presented to the ED after multiple bloody BM yesterday.  Admitted for GI bleed, symptomatic anemia requiring 2 units of PRBCs and syncope. Hospital course was complicated by urinary retention requiring straight cath.     SUBJECTIVE: Pt seen and examined at bedside, A&Ox3-pleasant in NAD. She endorses that she has been having trouble urinating within the past hour. She is afraid of "passing blood if she attempts."  Of note, pt with low grade temp.     OBJECTIVE:  Vital Signs Last 24 Hrs  T(C): 37.3 (04 Mar 2025 19:20), Max: 37.3 (04 Mar 2025 18:15)  T(F): 99.2 (04 Mar 2025 19:20), Max: 99.2 (04 Mar 2025 19:20)  HR: 91 (04 Mar 2025 19:20) (72 - 96)  BP: 110/65 (04 Mar 2025 19:20) (103/63 - 131/79)  BP(mean): --  RR: 17 (04 Mar 2025 19:20) (14 - 17)  SpO2: 99% (04 Mar 2025 19:20) (98% - 99%)    Parameters below as of 04 Mar 2025 19:20  Patient On (Oxygen Delivery Method): room air    PHYSICAL EXAM:  Neuro: Awake and alert, oriented to person, place, and time  Cardiovascular: + S1, S2, no murmurs, rubs, or bruits  Respiratory: clear to auscultation bilaterally with good air entry   GI: Abdomen soft, non-tender, bowel sounds present   : Non distended;   Skin: warm and dry; no rash      LABS:                        5.2    9.24  )-----------( 147      ( 04 Mar 2025 16:25 )             17.0     03-04    140  |  112[H]  |  55[H]  ----------------------------<  249[H]  5.2   |  26  |  1.50[H]    Ca    8.7      04 Mar 2025 16:25    TPro  5.9[L]  /  Alb  2.9[L]  /  TBili  0.1[L]  /  DBili  x   /  AST  14  /  ALT  16  /  AlkPhos  67  03-04        EKG:   IMAGING:    ASSESSMENT/PROBLEM: Acute Urinary Retention--r/o infectious cause (given low grade temp)    PLAN:     -Straight cath x 1  -UA with reflex  -Repeat bladder scan in 6-8 hours (see orders)  -Continue current/supportive measures    FOLLOW UP / RESULT:    -UA with reflex  -Repeat bladder scan results

## 2025-03-05 ENCOUNTER — APPOINTMENT (OUTPATIENT)
Dept: INTERNAL MEDICINE | Facility: CLINIC | Age: 75
End: 2025-03-05

## 2025-03-05 ENCOUNTER — RESULT REVIEW (OUTPATIENT)
Age: 75
End: 2025-03-05

## 2025-03-05 DIAGNOSIS — N39.0 URINARY TRACT INFECTION, SITE NOT SPECIFIED: ICD-10-CM

## 2025-03-05 DIAGNOSIS — N17.9 ACUTE KIDNEY FAILURE, UNSPECIFIED: ICD-10-CM

## 2025-03-05 DIAGNOSIS — Z75.8 OTHER PROBLEMS RELATED TO MEDICAL FACILITIES AND OTHER HEALTH CARE: ICD-10-CM

## 2025-03-05 LAB
A1C WITH ESTIMATED AVERAGE GLUCOSE RESULT: 6.6 % — HIGH (ref 4–5.6)
ALBUMIN SERPL ELPH-MCNC: 2.9 G/DL — LOW (ref 3.5–5)
ALP SERPL-CCNC: 59 U/L — SIGNIFICANT CHANGE UP (ref 40–120)
ALT FLD-CCNC: 17 U/L DA — SIGNIFICANT CHANGE UP (ref 10–60)
ANION GAP SERPL CALC-SCNC: 4 MMOL/L — LOW (ref 5–17)
AST SERPL-CCNC: 8 U/L — LOW (ref 10–40)
BASOPHILS # BLD AUTO: 0.02 K/UL — SIGNIFICANT CHANGE UP (ref 0–0.2)
BASOPHILS NFR BLD AUTO: 0.3 % — SIGNIFICANT CHANGE UP (ref 0–2)
BILIRUB SERPL-MCNC: 0.2 MG/DL — SIGNIFICANT CHANGE UP (ref 0.2–1.2)
BUN SERPL-MCNC: 46 MG/DL — HIGH (ref 7–18)
CALCIUM SERPL-MCNC: 8.8 MG/DL — SIGNIFICANT CHANGE UP (ref 8.4–10.5)
CHLORIDE SERPL-SCNC: 116 MMOL/L — HIGH (ref 96–108)
CO2 SERPL-SCNC: 26 MMOL/L — SIGNIFICANT CHANGE UP (ref 22–31)
CREAT SERPL-MCNC: 1.16 MG/DL — SIGNIFICANT CHANGE UP (ref 0.5–1.3)
EGFR: 50 ML/MIN/1.73M2 — LOW
EGFR: 50 ML/MIN/1.73M2 — LOW
EOSINOPHIL # BLD AUTO: 0.04 K/UL — SIGNIFICANT CHANGE UP (ref 0–0.5)
EOSINOPHIL NFR BLD AUTO: 0.5 % — SIGNIFICANT CHANGE UP (ref 0–6)
ESTIMATED AVERAGE GLUCOSE: 143 MG/DL — HIGH (ref 68–114)
GLUCOSE BLDC GLUCOMTR-MCNC: 101 MG/DL — HIGH (ref 70–99)
GLUCOSE BLDC GLUCOMTR-MCNC: 141 MG/DL — HIGH (ref 70–99)
GLUCOSE BLDC GLUCOMTR-MCNC: 143 MG/DL — HIGH (ref 70–99)
GLUCOSE BLDC GLUCOMTR-MCNC: 165 MG/DL — HIGH (ref 70–99)
GLUCOSE BLDC GLUCOMTR-MCNC: 186 MG/DL — HIGH (ref 70–99)
GLUCOSE BLDC GLUCOMTR-MCNC: 64 MG/DL — LOW (ref 70–99)
GLUCOSE SERPL-MCNC: 137 MG/DL — HIGH (ref 70–99)
HCT VFR BLD CALC: 22.1 % — LOW (ref 34.5–45)
HCT VFR BLD CALC: 27.6 % — LOW (ref 34.5–45)
HGB BLD-MCNC: 7.2 G/DL — LOW (ref 11.5–15.5)
HGB BLD-MCNC: 9.1 G/DL — LOW (ref 11.5–15.5)
IMM GRANULOCYTES NFR BLD AUTO: 1.3 % — HIGH (ref 0–0.9)
LYMPHOCYTES # BLD AUTO: 2.98 K/UL — SIGNIFICANT CHANGE UP (ref 1–3.3)
LYMPHOCYTES # BLD AUTO: 39.4 % — SIGNIFICANT CHANGE UP (ref 13–44)
MAGNESIUM SERPL-MCNC: 2.6 MG/DL — SIGNIFICANT CHANGE UP (ref 1.6–2.6)
MCHC RBC-ENTMCNC: 29.8 PG — SIGNIFICANT CHANGE UP (ref 27–34)
MCHC RBC-ENTMCNC: 29.8 PG — SIGNIFICANT CHANGE UP (ref 27–34)
MCHC RBC-ENTMCNC: 32.6 G/DL — SIGNIFICANT CHANGE UP (ref 32–36)
MCHC RBC-ENTMCNC: 33 G/DL — SIGNIFICANT CHANGE UP (ref 32–36)
MCV RBC AUTO: 90.5 FL — SIGNIFICANT CHANGE UP (ref 80–100)
MCV RBC AUTO: 91.3 FL — SIGNIFICANT CHANGE UP (ref 80–100)
MONOCYTES # BLD AUTO: 0.51 K/UL — SIGNIFICANT CHANGE UP (ref 0–0.9)
MONOCYTES NFR BLD AUTO: 6.7 % — SIGNIFICANT CHANGE UP (ref 2–14)
NEUTROPHILS # BLD AUTO: 3.91 K/UL — SIGNIFICANT CHANGE UP (ref 1.8–7.4)
NEUTROPHILS NFR BLD AUTO: 51.8 % — SIGNIFICANT CHANGE UP (ref 43–77)
NRBC BLD AUTO-RTO: 0 /100 WBCS — SIGNIFICANT CHANGE UP (ref 0–0)
NRBC BLD AUTO-RTO: 0 /100 WBCS — SIGNIFICANT CHANGE UP (ref 0–0)
PHOSPHATE SERPL-MCNC: 3.3 MG/DL — SIGNIFICANT CHANGE UP (ref 2.5–4.5)
PLATELET # BLD AUTO: 135 K/UL — LOW (ref 150–400)
PLATELET # BLD AUTO: 149 K/UL — LOW (ref 150–400)
POTASSIUM SERPL-MCNC: 4.4 MMOL/L — SIGNIFICANT CHANGE UP (ref 3.5–5.3)
POTASSIUM SERPL-SCNC: 4.4 MMOL/L — SIGNIFICANT CHANGE UP (ref 3.5–5.3)
PROT SERPL-MCNC: 5.7 G/DL — LOW (ref 6–8.3)
RBC # BLD: 2.42 M/UL — LOW (ref 3.8–5.2)
RBC # BLD: 3.05 M/UL — LOW (ref 3.8–5.2)
RBC # FLD: 13.7 % — SIGNIFICANT CHANGE UP (ref 10.3–14.5)
RBC # FLD: 14.8 % — HIGH (ref 10.3–14.5)
SODIUM SERPL-SCNC: 146 MMOL/L — HIGH (ref 135–145)
WBC # BLD: 7.43 K/UL — SIGNIFICANT CHANGE UP (ref 3.8–10.5)
WBC # BLD: 7.56 K/UL — SIGNIFICANT CHANGE UP (ref 3.8–10.5)
WBC # FLD AUTO: 7.43 K/UL — SIGNIFICANT CHANGE UP (ref 3.8–10.5)
WBC # FLD AUTO: 7.56 K/UL — SIGNIFICANT CHANGE UP (ref 3.8–10.5)

## 2025-03-05 PROCEDURE — 99223 1ST HOSP IP/OBS HIGH 75: CPT | Mod: FS

## 2025-03-05 RX ORDER — POLYETHYLENE GLYCOL-3350 AND ELECTROLYTES 236; 6.74; 5.86; 2.97; 22.74 G/274.31G; G/274.31G; G/274.31G; G/274.31G; G/274.31G
4000 POWDER, FOR SOLUTION ORAL ONCE
Refills: 0 | Status: COMPLETED | OUTPATIENT
Start: 2025-03-05 | End: 2025-03-05

## 2025-03-05 RX ADMIN — INSULIN LISPRO 1: 100 INJECTION, SOLUTION INTRAVENOUS; SUBCUTANEOUS at 17:34

## 2025-03-05 RX ADMIN — Medication 650 MILLIGRAM(S): at 02:00

## 2025-03-05 RX ADMIN — Medication 40 MILLIGRAM(S): at 05:55

## 2025-03-05 RX ADMIN — Medication 650 MILLIGRAM(S): at 00:04

## 2025-03-05 RX ADMIN — POLYETHYLENE GLYCOL-3350 AND ELECTROLYTES 4000 MILLILITER(S): 236; 6.74; 5.86; 2.97; 22.74 POWDER, FOR SOLUTION ORAL at 18:08

## 2025-03-05 RX ADMIN — Medication 40 MILLIGRAM(S): at 18:10

## 2025-03-05 RX ADMIN — INSULIN LISPRO 1: 100 INJECTION, SOLUTION INTRAVENOUS; SUBCUTANEOUS at 11:56

## 2025-03-05 NOTE — CHART NOTE - NSCHARTNOTEFT_GEN_A_CORE
EVENT: notified by RN that patient had 2 episode blood BM    SUBJECTIVE: patient seen and examined at bedside, in NAD, symptomatic.    OBJECTIVE:  Vital Signs Last 24 Hrs  T(C): 36.8 (05 Mar 2025 20:38), Max: 38 (04 Mar 2025 23:55)  T(F): 98.2 (05 Mar 2025 20:38), Max: 100.4 (04 Mar 2025 23:55)  HR: 78 (05 Mar 2025 20:38) (74 - 101)  BP: 127/66 (05 Mar 2025 20:38) (111/54 - 134/68)  BP(mean): 72 (05 Mar 2025 11:19) (72 - 72)  RR: 18 (05 Mar 2025 20:38) (16 - 19)  SpO2: 100% (05 Mar 2025 20:38) (95% - 100%)    Parameters below as of 05 Mar 2025 20:38  Patient On (Oxygen Delivery Method): room air    PHYSICAL EXAM:  Neuro: Awake and alert, oriented to person, place, and time  Cardiovascular: + S1, S2, no murmurs, rubs, or bruits  Respiratory: clear to auscultation bilaterally with good air entry   GI: Abdomen soft, non-tender, bowel sounds present   : Non distended;   Skin: warm and dry; no rash      LABS:                        7.2    7.56  )-----------( 149      ( 05 Mar 2025 05:25 )             22.1     03-05    146[H]  |  116[H]  |  46[H]  ----------------------------<  137[H]  4.4   |  26  |  1.16    Ca    8.8      05 Mar 2025 05:25  Phos  3.3     03-05  Mg     2.6     03-05    TPro  5.7[L]  /  Alb  2.9[L]  /  TBili  0.2  /  DBili  x   /  AST  8[L]  /  ALT  17  /  AlkPhos  59  03-05      Problem: GI Bleed    PLAN:   Check CBC at midnight  Transfuse if less that 7  Continue current care and supportive measures.       FOLLOW UP / RESULT: EVENT:   Notified by RN that patient had 2 episode blood BM.   Patient BG 64 mg/dl    SUBJECTIVE: patient seen and examined at bedside, in NAD, symptomatic. vital signs stable     OBJECTIVE:  Vital Signs Last 24 Hrs  T(C): 36.8 (05 Mar 2025 20:38), Max: 38 (04 Mar 2025 23:55)  T(F): 98.2 (05 Mar 2025 20:38), Max: 100.4 (04 Mar 2025 23:55)  HR: 78 (05 Mar 2025 20:38) (74 - 101)  BP: 127/66 (05 Mar 2025 20:38) (111/54 - 134/68)  BP(mean): 72 (05 Mar 2025 11:19) (72 - 72)  RR: 18 (05 Mar 2025 20:38) (16 - 19)  SpO2: 100% (05 Mar 2025 20:38) (95% - 100%)    Parameters below as of 05 Mar 2025 20:38  Patient On (Oxygen Delivery Method): room air    PHYSICAL EXAM:  Neuro: Awake and alert, oriented to person, place, and time  Cardiovascular: + S1, S2, no murmurs, rubs, or bruits  Respiratory: clear to auscultation bilaterally with good air entry   GI: Abdomen soft, non-tender, bowel sounds present   : Non distended;   Skin: warm and dry; no rash      LABS:                        7.2    7.56  )-----------( 149      ( 05 Mar 2025 05:25 )             22.1     03-05    146[H]  |  116[H]  |  46[H]  ----------------------------<  137[H]  4.4   |  26  |  1.16    Ca    8.8      05 Mar 2025 05:25  Phos  3.3     03-05  Mg     2.6     03-05    TPro  5.7[L]  /  Alb  2.9[L]  /  TBili  0.2  /  DBili  x   /  AST  8[L]  /  ALT  17  /  AlkPhos  59  03-05      Problem: GI Bleed    PLAN:   Check CBC at midnight  Transfuse if less that 7  Continue current care and supportive measures.       FOLLOW UP / RESULT: EVENT:   Notified by RN that patient had 2 episode bloody BM.   Patient BG 64 mg/dl    SUBJECTIVE: Patient seen and examined at bedside, in NAD, reports that she feels well overall, asymptomatic- currently wearing her CGM that alerts her of abnormal BG. BG was 64 mg/dl, she drink 2 cups of juice, feels at baseline. Vital signs stable. Reports that her BG normally drops over night and reports being afraid to sleep in fear her blood glucose will drop over night  and she will be unaware.      OBJECTIVE:  Vital Signs Last 24 Hrs  T(C): 36.8 (05 Mar 2025 20:38), Max: 38 (04 Mar 2025 23:55)  T(F): 98.2 (05 Mar 2025 20:38), Max: 100.4 (04 Mar 2025 23:55)  HR: 78 (05 Mar 2025 20:38) (74 - 101)  BP: 127/66 (05 Mar 2025 20:38) (111/54 - 134/68)  BP(mean): 72 (05 Mar 2025 11:19) (72 - 72)  RR: 18 (05 Mar 2025 20:38) (16 - 19)  SpO2: 100% (05 Mar 2025 20:38) (95% - 100%)    Parameters below as of 05 Mar 2025 20:38  Patient On (Oxygen Delivery Method): room air    PHYSICAL EXAM:  Neuro: Awake and alert, oriented to person, place, and time  Cardiovascular: + S1, S2, no murmurs, rubs, or bruits  Respiratory: clear to auscultation bilaterally with good air entry   GI: Abdomen soft, non-tender, bowel sounds present   : Non distended;   Skin: warm and dry; no rash      LABS:                        7.2    7.56  )-----------( 149      ( 05 Mar 2025 05:25 )             22.1     03-05    146[H]  |  116[H]  |  46[H]  ----------------------------<  137[H]  4.4   |  26  |  1.16    Ca    8.8      05 Mar 2025 05:25  Phos  3.3     03-05  Mg     2.6     03-05    TPro  5.7[L]  /  Alb  2.9[L]  /  TBili  0.2  /  DBili  x   /  AST  8[L]  /  ALT  17  /  AlkPhos  59  03-05      Problem: GI Bleed x 2 episode.     PLAN:   #1  Check CBC at midnight  Transfuse if less that 7  #2  Start D5 NS at 50 ml/hr  Continue current care and supportive measures.     FOLLOW UP / RESULT:   -f/u CBC and transfuse if < 7  -Reassess for effect of above therapy. EVENT:   Notified by RN that patient had 2 episode bloody BM.     HPI: 73F AAOX3, Ambulates with a cane with history significant for HTN, DM, GERD and  endometrial  CA s/p hysterectomy completed chemo/radiation therapy in December presented to the ED after multiple bloody BM.  Patient admitted for GI bleed, symptomatic anemia and syncope. EGD/ colonoscopy today.     SUBJECTIVE: Patient seen and examined at bedside, in NAD, reports that she feels well overall, asymptomatic- currently wearing her CGM that alerts her of abnormal BG. BG was 64 mg/dl, she drink 2 cups of juice, feels at baseline. Vital signs stable. Reports that her BG normally drops over night and reports being afraid to sleep in fear her blood glucose will drop over night  and she will be unaware.     OBJECTIVE:  Vital Signs Last 24 Hrs  T(C): 36.8 (05 Mar 2025 20:38), Max: 38 (04 Mar 2025 23:55)  T(F): 98.2 (05 Mar 2025 20:38), Max: 100.4 (04 Mar 2025 23:55)  HR: 78 (05 Mar 2025 20:38) (74 - 101)  BP: 127/66 (05 Mar 2025 20:38) (111/54 - 134/68)  BP(mean): 72 (05 Mar 2025 11:19) (72 - 72)  RR: 18 (05 Mar 2025 20:38) (16 - 19)  SpO2: 100% (05 Mar 2025 20:38) (95% - 100%)    Parameters below as of 05 Mar 2025 20:38  Patient On (Oxygen Delivery Method): room air    PHYSICAL EXAM:  Neuro: Awake and alert, oriented to person, place, and time  Cardiovascular: + S1, S2, no murmurs, rubs, or bruits  Respiratory: clear to auscultation bilaterally with good air entry   GI: Abdomen soft, non-tender, bowel sounds present   : Non distended;   Skin: warm and dry; no rash      LABS:                        7.2    7.56  )-----------( 149      ( 05 Mar 2025 05:25 )             22.1     03-05    146[H]  |  116[H]  |  46[H]  ----------------------------<  137[H]  4.4   |  26  |  1.16    Ca    8.8      05 Mar 2025 05:25  Phos  3.3     03-05  Mg     2.6     03-05    TPro  5.7[L]  /  Alb  2.9[L]  /  TBili  0.2  /  DBili  x   /  AST  8[L]  /  ALT  17  /  AlkPhos  59  03-05      Problem: GI Bleed x 2 episode.     PLAN:   #1  Check CBC at midnight  Transfuse if less that 7  #2  Start D5 NS at 50 ml/hr  Continue current care and supportive measures.     FOLLOW UP / RESULT:   -f/u CBC and transfuse if < 7  -Reassess for effect of above therapy.

## 2025-03-05 NOTE — CONSULT NOTE ADULT - SUBJECTIVE AND OBJECTIVE BOX
INITIAL GI CONSULTATION    Patient is a 74y old  Female who presents with a chief complaint of GI Bleed, symptomatic anemia (05 Mar 2025 09:42)    HPI:  73F AAOX3, Ambulates with a cane with history significant for HTN, DM, GERD and  endometrial  CA s/p hysterectomy completed chemo/radiation therapy in december presented to the ED after multiple bloody BM yesterday. Patient presented similarly in  october of last year. Patient had 4 episodes of bloody diarrhea with clots yesterday. Patient endorsing associated fatigue, weakness, abdominal pressure and dizziness over the last several days as well as one episode of syncope while in bed. Patient denies any fever, chills, dizziness ,headache, SOB, cough, chest pain, palpitations, nausea, vomiting,  urinary symptoms, lower extremity pain, or edema. Patient denies recent travel or sick contacts.  (04 Mar 2025 18:00)    GI HPI: patient seen and examined on unit. resting in bed. Patient endorsed coming into the ED after 4 episodes of blood BMs beginning on Monday. Last BM this morning with clots. Patient denies N/V. Denies NSAID use. Denies loss of appetite or weight loss.     PMH/PSH:  PAST MEDICAL & SURGICAL HISTORY:  Chronic GERD      Internal hemorrhoid      Leiomyosarcoma of uterus      DM (diabetes mellitus)      Anemia      History of herniated intervertebral disc      HTN (hypertension)      S/P cataract surgery      S/P bunionectomy      S/P foot surgery            FH:  FAMILY HISTORY:  FH: pancreatic cancer (Father)  age 77    FHx: colon cancer (Mother)  age 81            MEDS:  MEDICATIONS  (STANDING):  insulin lispro (ADMELOG) corrective regimen sliding scale   SubCutaneous three times a day before meals  insulin lispro (ADMELOG) corrective regimen sliding scale   SubCutaneous at bedtime  pantoprazole  Injectable 40 milliGRAM(s) IV Push every 12 hours    MEDICATIONS  (PRN):  acetaminophen     Tablet .. 650 milliGRAM(s) Oral every 6 hours PRN Temp greater or equal to 38C (100.4F), Mild Pain (1 - 3)  aluminum hydroxide/magnesium hydroxide/simethicone Suspension 30 milliLiter(s) Oral every 4 hours PRN Dyspepsia  melatonin 3 milliGRAM(s) Oral at bedtime PRN Insomnia  ondansetron Injectable 4 milliGRAM(s) IV Push every 8 hours PRN Nausea and/or Vomiting    Allergies    No Known Allergies    Intolerances          ROS: A detailed set of ROS were asked and negative except those outlined in GI HPI.  ______________________________________________________________________  PHYSICAL EXAM:  T(C): 36.9 (03-05-25 @ 11:19), Max: 38 (03-04-25 @ 23:55)  HR: 78 (03-05-25 @ 11:19)  BP: 118/53 (03-05-25 @ 11:19)  RR: 16 (03-05-25 @ 11:19)  SpO2: 99% (03-05-25 @ 11:19)  Wt(kg): --    03-04 - 03-05  --------------------------------------------------------  IN:    Oral Fluid: 50 mL    PRBCs (Packed Red Blood Cells): 600 mL  Total IN: 650 mL    OUT:    Voided (mL): 1200 mL  Total OUT: 1200 mL    Total NET: -550 mL          GEN: NAD  PULM: clear  CV: HR normal  GI: Soft, NT, ND; +BS in all four quadrants  MSK:  no edema  NEURO: A&O x 3  ______________________________________________________________________  LABS:                        7.2    7.56  )-----------( 149      ( 05 Mar 2025 05:25 )             22.1     03-05    146[H]  |  116[H]  |  46[H]  ----------------------------<  137[H]  4.4   |  26  |  1.16    Ca    8.8      05 Mar 2025 05:25  Phos  3.3     03-05  Mg     2.6     03-05    TPro  5.7[L]  /  Alb  2.9[L]  /  TBili  0.2  /  DBili  x   /  AST  8[L]  /  ALT  17  /  AlkPhos  59  03-05    LIVER FUNCTIONS - ( 05 Mar 2025 05:25 )  Alb: 2.9 g/dL / Pro: 5.7 g/dL / ALK PHOS: 59 U/L / ALT: 17 U/L DA / AST: 8 U/L / GGT: x             ____________________________________________    IMAGING:    *******    This note and its recommendations herein are preliminary until such time as cosigned by an attending.   INITIAL GI CONSULTATION    Patient is a 74y old  Female who presents with a chief complaint of GI Bleed, symptomatic anemia (05 Mar 2025 09:42)    HPI:  73F AAOX3, Ambulates with a cane with history significant for HTN, DM, GERD and  endometrial  CA s/p hysterectomy completed chemo/radiation therapy in december presented to the ED after multiple bloody BM yesterday. Patient presented similarly in  october of last year. Patient had 4 episodes of bloody diarrhea with clots yesterday. Patient endorsing associated fatigue, weakness, abdominal pressure and dizziness over the last several days as well as one episode of syncope while in bed. Patient denies any fever, chills, dizziness ,headache, SOB, cough, chest pain, palpitations, nausea, vomiting,  urinary symptoms, lower extremity pain, or edema. Patient denies recent travel or sick contacts.  (04 Mar 2025 18:00)    GI HPI: patient seen and examined on unit. Sitting in bedside chair. Patient endorsed coming into the ED after 4 episodes of blood BMs beginning on Monday. Last BM this morning with clots. Patient denies N/V. Denies NSAID use. Denies loss of appetite or weight loss. Endorses a similar episode in october when she was admitted to hospital. colonoscopy performed during last admission in October with findings of Diverticulosis throughout sigmoid, descending and transverse colon. Moderate internal hemorrhoids. Last EGD on 9/30/21 with findings of grade B esophagitis    PMH/PSH:  PAST MEDICAL & SURGICAL HISTORY:  Chronic GERD      Internal hemorrhoid      Leiomyosarcoma of uterus      DM (diabetes mellitus)      Anemia      History of herniated intervertebral disc      HTN (hypertension)      S/P cataract surgery      S/P bunionectomy      S/P foot surgery            FH:  FAMILY HISTORY:  FH: pancreatic cancer (Father)  age 77    FHx: colon cancer (Mother)  age 81            MEDS:  MEDICATIONS  (STANDING):  insulin lispro (ADMELOG) corrective regimen sliding scale   SubCutaneous three times a day before meals  insulin lispro (ADMELOG) corrective regimen sliding scale   SubCutaneous at bedtime  pantoprazole  Injectable 40 milliGRAM(s) IV Push every 12 hours    MEDICATIONS  (PRN):  acetaminophen     Tablet .. 650 milliGRAM(s) Oral every 6 hours PRN Temp greater or equal to 38C (100.4F), Mild Pain (1 - 3)  aluminum hydroxide/magnesium hydroxide/simethicone Suspension 30 milliLiter(s) Oral every 4 hours PRN Dyspepsia  melatonin 3 milliGRAM(s) Oral at bedtime PRN Insomnia  ondansetron Injectable 4 milliGRAM(s) IV Push every 8 hours PRN Nausea and/or Vomiting    Allergies    No Known Allergies    Intolerances          ROS: A detailed set of ROS were asked and negative except those outlined in GI HPI.  ______________________________________________________________________  PHYSICAL EXAM:  T(C): 36.9 (03-05-25 @ 11:19), Max: 38 (03-04-25 @ 23:55)  HR: 78 (03-05-25 @ 11:19)  BP: 118/53 (03-05-25 @ 11:19)  RR: 16 (03-05-25 @ 11:19)  SpO2: 99% (03-05-25 @ 11:19)  Wt(kg): --    03-04 - 03-05  --------------------------------------------------------  IN:    Oral Fluid: 50 mL    PRBCs (Packed Red Blood Cells): 600 mL  Total IN: 650 mL    OUT:    Voided (mL): 1200 mL  Total OUT: 1200 mL    Total NET: -550 mL          GEN: NAD  PULM: clear  CV: HR normal  GI: Soft, NT, ND; +BS in all four quadrants  MSK:  no edema  NEURO: A&O x 3  ______________________________________________________________________  LABS:                        7.2    7.56  )-----------( 149      ( 05 Mar 2025 05:25 )             22.1     03-05    146[H]  |  116[H]  |  46[H]  ----------------------------<  137[H]  4.4   |  26  |  1.16    Ca    8.8      05 Mar 2025 05:25  Phos  3.3     03-05  Mg     2.6     03-05    TPro  5.7[L]  /  Alb  2.9[L]  /  TBili  0.2  /  DBili  x   /  AST  8[L]  /  ALT  17  /  AlkPhos  59  03-05    LIVER FUNCTIONS - ( 05 Mar 2025 05:25 )  Alb: 2.9 g/dL / Pro: 5.7 g/dL / ALK PHOS: 59 U/L / ALT: 17 U/L DA / AST: 8 U/L / GGT: x             ____________________________________________    IMAGING:    *******    This note and its recommendations herein are preliminary until such time as cosigned by an attending.

## 2025-03-05 NOTE — CONSULT NOTE ADULT - ASSESSMENT
73F AAOX3, Ambulates with a cane with history significant for HTN, DM, GERD and  endometrial  CA s/p hysterectomy completed chemo/radiation therapy in december presented to the ED after multiple bloody BM yesterday. GI consulted for hematochezia    PLAN:  #Hematochezia  r/o diverticulosis bleed  r/o hemorrhoids    - Clear liquid diet  - Maintain active T&S, 2 large bore peripheral IVs, transfuse for goal Hgb >7 or if symptomatic  - Trend H/H  -s/p 2 units PRBCs with appropriate response  - Fluid resuscitation/HD stability per primary team  - Avoid NSAIDs and hold anticoagulation   -    This note and its recommendations herein are preliminary until such time as cosigned by an attending 73F AAOX3, Ambulates with a cane with history significant for HTN, DM, GERD and  endometrial  CA s/p hysterectomy completed chemo/radiation therapy in december presented to the ED after multiple bloody BM yesterday. GI consulted for hematochezia    PLAN:  #Hematochezia  r/o diverticulosis bleed  r/o hemorrhoids    - Clear liquid diet  - Maintain active T&S, 2 large bore peripheral IVs, transfuse for goal Hgb >7 or if symptomatic  - Trend H/H  - BUN noted, elevated   -s/p 2 units PRBCs with appropriate response  - Fluid resuscitation/HD stability per primary team  - Avoid NSAIDs and hold anticoagulation   - EGD/COLONOSCOPY tomorrow  -NPO after midnight  -Bowel prep with GoLYTELY today      This note and its recommendations herein are preliminary until such time as cosigned by an attending

## 2025-03-05 NOTE — CONSULT NOTE ADULT - NS ATTEND AMEND GEN_ALL_CORE FT
73F h/o  HTN, DM, GERD and  endometrial  CA s/p hysterectomy completed chemo/radiation therapy in december presented to the ED after multiple bloody BM yesterday. Colon 10/24 + tics but also poor prep. This time also BUN 2x baseline (40s normally now 20s). Agree with 1 U PRBC transfusion.   Plan  EGD and colon to determine cause of bleed tomorrow  clears, prep today  NPO after midnight

## 2025-03-05 NOTE — PROGRESS NOTE ADULT - PROBLEM SELECTOR PLAN 3
- p/w cr 1.5  - cr 1.16 today  - Nephro Dr Michael's group consulted by attending - p/w cr 1.5  - cr 1.16 today  - Nephro Dr Steve Francis consulted by attending

## 2025-03-05 NOTE — PROGRESS NOTE ADULT - NS ATTEND AMEND GEN_ALL_CORE FT
73F AAOX3, Ambulates with a cane with history significant for HTN, DM, GERD and  endometrial  CA s/p hysterectomy completed chemo/radiation therapy in December presented to the ED after multiple bloody BM.  Patient admitted for GI bleed, symptomatic anemia and syncope.      T(C): 36.8 (03-05-25 @ 20:38), Max: 36.9 (03-05-25 @ 11:19)  HR: 78 (03-05-25 @ 20:38) (74 - 101)  BP: 127/66 (03-05-25 @ 20:38) (118/53 - 134/68)  RR: 18 (03-05-25 @ 20:38) (16 - 18)  SpO2: 100% (03-05-25 @ 20:38) (99% - 100%)      Reviewed labs and imaging     Acute Blood Loss Anemia   GI Bleed   Syncope     s/p PRBC transfusion   GI consulted  EGD/ Colonoscopy   Echo   Cards consulted  DM HISS  HTN  CAD s/p stents

## 2025-03-05 NOTE — CONSULT NOTE ADULT - SUBJECTIVE AND OBJECTIVE BOX
C A R D I O L O G Y  *********************    DATE OF SERVICE: 03-05-25    HISTORY OF PRESENT ILLNESS:  73F AAOX3, Ambulates with a cane with history significant for HTN, DM, GERD and  endometrial  CA s/p hysterectomy completed chemo/radiation therapy in december presented to the ED after multiple bloody BM yesterday. Patient presented similarly in  october of last year. Patient had 4 episodes of bloody diarrhea with clots yesterday. Patient endorsing associated fatigue, weakness, abdominal pressure and dizziness over the last several days as well as one episode of syncope while in bed. Patient denies any fever, chills, dizziness ,headache, SOB, cough, chest pain, palpitations, nausea, vomiting,  urinary symptoms, lower extremity pain, or edema. Patient denies recent travel or sick contacts.  (04 Mar 2025 18:00)      PAST MEDICAL & SURGICAL HISTORY:  Chronic GERD  Internal hemorrhoid  Leiomyosarcoma of uterus  DM (diabetes mellitus)  Anemia  History of herniated intervertebral disc  HTN (hypertension)  S/P cataract surgery  S/P bunionectomy  S/P foot surgery        MEDICATIONS:  MEDICATIONS  (STANDING):  insulin lispro (ADMELOG) corrective regimen sliding scale   SubCutaneous three times a day before meals  insulin lispro (ADMELOG) corrective regimen sliding scale   SubCutaneous at bedtime  pantoprazole  Injectable 40 milliGRAM(s) IV Push every 12 hours      Allergies    No Known Allergies    Intolerances        FAMILY HISTORY:  FH: pancreatic cancer (Father)  age 77    FHx: colon cancer (Mother)  age 81        Non-contributary for premature coronary disease or sudden cardiac death    SOCIAL HISTORY:    [ X] Non-smoker  [ ] Smoker  [ ] Alcohol        REVIEW OF SYSTEMS:  [ ]chest pain  [  ]shortness of breath  [  ]palpitations  [  ]syncope  [ ]near syncope [ ]upper extremity weakness   [ ] lower extremity weakness  [  ]diplopia  [  ]altered mental status   [  ]fevers  [ ]chills [ ]nausea  [ ]vomitting  [  ]dysphagia    [ ]abdominal pain  [ ]melena  [ ]BRBPR    [  ]epistaxis  [  ]rash    [ ]lower extremity edema        [X] All others negative	  [ ] Unable to obtain      LABS:	 	    CARDIAC MARKERS:                            7.2    7.56  )-----------( 149      ( 05 Mar 2025 05:25 )             22.1     Hb Trend: 7.2<--, 6.9<--, 5.2<--    03-05    146[H]  |  116[H]  |  46[H]  ----------------------------<  137[H]  4.4   |  26  |  1.16    Ca    8.8      05 Mar 2025 05:25  Phos  3.3     03-05  Mg     2.6     03-05    TPro  5.7[L]  /  Alb  2.9[L]  /  TBili  0.2  /  DBili  x   /  AST  8[L]  /  ALT  17  /  AlkPhos  59  03-05    Creatinine Trend: 1.16<--, 1.50<--      PHYSICAL EXAM:  T(C): 37 (03-05-25 @ 07:38), Max: 38 (03-04-25 @ 23:55)  HR: 86 (03-05-25 @ 07:38) (72 - 96)  BP: 122/66 (03-05-25 @ 07:38) (103/63 - 136/59)  RR: 18 (03-05-25 @ 07:38) (14 - 19)  SpO2: 95% (03-05-25 @ 07:38) (95% - 99%)  Wt(kg): --     I&O's Summary    04 Mar 2025 07:01  -  05 Mar 2025 07:00  --------------------------------------------------------  IN: 650 mL / OUT: 1200 mL / NET: -550 mL      HEENT:  (-)icterus (-)pallor  CV: N S1 S2 1/6 YASSINE (+)2 Pulses B/l  Resp:  Clear to ausculatation B/L, normal effort  GI: (+) BS Soft, NT, ND  Lymph:  (-)Edema, (-)obvious lymphadenopathy  Skin: Warm to touch, Normal turgor  Psych: Appropriate mood and affect        TELEMETRY: 	  sinus    ECG:  	Sinus 94 BPM, nonspecific T wave abnormality      RADIOLOGY:         CXR:   NONE    ASSESSMENT/PLAN: 	74y Female history significant for HTN, DM, GERD and  endometrial  CA s/p hysterectomy completed chemo/radiation therapy in december presented to the ED after multiple bloody BM yesterday weakness and LOC.    # ? syncope  - unclear if patient lost consciousness as she was laying down in bed  - Suspect hemodynamically meidated event in the setting of profound anemia  - check orthostatic BP   - Echo  - narrow QRS on EKG is reassuring  - no pertinent findings on tele thus far    # Gi Bleed  - GI f/y  - monitor H/H    I once again thank you for allowing me to participate in the care of your patient.  If you have any questions or concerns please do not hesitate to contact me.    Napoleon Wolfe MD, FACC  BEEPER [phone]

## 2025-03-06 ENCOUNTER — TRANSCRIPTION ENCOUNTER (OUTPATIENT)
Age: 75
End: 2025-03-06

## 2025-03-06 LAB
-  AMOXICILLIN/CLAVULANIC ACID: SIGNIFICANT CHANGE UP
-  AMPICILLIN/SULBACTAM: SIGNIFICANT CHANGE UP
-  AMPICILLIN: SIGNIFICANT CHANGE UP
-  AZTREONAM: SIGNIFICANT CHANGE UP
-  CEFAZOLIN: SIGNIFICANT CHANGE UP
-  CEFEPIME: SIGNIFICANT CHANGE UP
-  CEFOXITIN: SIGNIFICANT CHANGE UP
-  CEFTRIAXONE: SIGNIFICANT CHANGE UP
-  CEFUROXIME: SIGNIFICANT CHANGE UP
-  CIPROFLOXACIN: SIGNIFICANT CHANGE UP
-  ERTAPENEM: SIGNIFICANT CHANGE UP
-  GENTAMICIN: SIGNIFICANT CHANGE UP
-  IMIPENEM: SIGNIFICANT CHANGE UP
-  LEVOFLOXACIN: SIGNIFICANT CHANGE UP
-  MEROPENEM: SIGNIFICANT CHANGE UP
-  NITROFURANTOIN: SIGNIFICANT CHANGE UP
-  PIPERACILLIN/TAZOBACTAM: SIGNIFICANT CHANGE UP
-  TOBRAMYCIN: SIGNIFICANT CHANGE UP
-  TRIMETHOPRIM/SULFAMETHOXAZOLE: SIGNIFICANT CHANGE UP
ALBUMIN SERPL ELPH-MCNC: 3 G/DL — LOW (ref 3.5–5)
ALP SERPL-CCNC: 60 U/L — SIGNIFICANT CHANGE UP (ref 40–120)
ALT FLD-CCNC: 16 U/L DA — SIGNIFICANT CHANGE UP (ref 10–60)
ANION GAP SERPL CALC-SCNC: 7 MMOL/L — SIGNIFICANT CHANGE UP (ref 5–17)
APTT BLD: 32.1 SEC — SIGNIFICANT CHANGE UP (ref 24.5–35.6)
AST SERPL-CCNC: 13 U/L — SIGNIFICANT CHANGE UP (ref 10–40)
BASOPHILS # BLD AUTO: 0.01 K/UL — SIGNIFICANT CHANGE UP (ref 0–0.2)
BASOPHILS NFR BLD AUTO: 0.2 % — SIGNIFICANT CHANGE UP (ref 0–2)
BILIRUB SERPL-MCNC: 0.3 MG/DL — SIGNIFICANT CHANGE UP (ref 0.2–1.2)
BUN SERPL-MCNC: 23 MG/DL — HIGH (ref 7–18)
CALCIUM SERPL-MCNC: 8.5 MG/DL — SIGNIFICANT CHANGE UP (ref 8.4–10.5)
CHLORIDE SERPL-SCNC: 112 MMOL/L — HIGH (ref 96–108)
CO2 SERPL-SCNC: 25 MMOL/L — SIGNIFICANT CHANGE UP (ref 22–31)
CREAT SERPL-MCNC: 0.93 MG/DL — SIGNIFICANT CHANGE UP (ref 0.5–1.3)
CULTURE RESULTS: ABNORMAL
EGFR: 65 ML/MIN/1.73M2 — SIGNIFICANT CHANGE UP
EGFR: 65 ML/MIN/1.73M2 — SIGNIFICANT CHANGE UP
EOSINOPHIL # BLD AUTO: 0.1 K/UL — SIGNIFICANT CHANGE UP (ref 0–0.5)
EOSINOPHIL NFR BLD AUTO: 1.6 % — SIGNIFICANT CHANGE UP (ref 0–6)
GLUCOSE BLDC GLUCOMTR-MCNC: 150 MG/DL — HIGH (ref 70–99)
GLUCOSE BLDC GLUCOMTR-MCNC: 150 MG/DL — HIGH (ref 70–99)
GLUCOSE BLDC GLUCOMTR-MCNC: 169 MG/DL — HIGH (ref 70–99)
GLUCOSE BLDC GLUCOMTR-MCNC: 191 MG/DL — HIGH (ref 70–99)
GLUCOSE BLDC GLUCOMTR-MCNC: 198 MG/DL — HIGH (ref 70–99)
GLUCOSE BLDC GLUCOMTR-MCNC: 92 MG/DL — SIGNIFICANT CHANGE UP (ref 70–99)
GLUCOSE SERPL-MCNC: 137 MG/DL — HIGH (ref 70–99)
HCT VFR BLD CALC: 25.9 % — LOW (ref 34.5–45)
HGB BLD-MCNC: 8.5 G/DL — LOW (ref 11.5–15.5)
IMM GRANULOCYTES NFR BLD AUTO: 0.6 % — SIGNIFICANT CHANGE UP (ref 0–0.9)
INR BLD: 0.97 RATIO — SIGNIFICANT CHANGE UP (ref 0.85–1.16)
LYMPHOCYTES # BLD AUTO: 1.98 K/UL — SIGNIFICANT CHANGE UP (ref 1–3.3)
LYMPHOCYTES # BLD AUTO: 31.6 % — SIGNIFICANT CHANGE UP (ref 13–44)
MAGNESIUM SERPL-MCNC: 2.1 MG/DL — SIGNIFICANT CHANGE UP (ref 1.6–2.6)
MCHC RBC-ENTMCNC: 29.8 PG — SIGNIFICANT CHANGE UP (ref 27–34)
MCHC RBC-ENTMCNC: 32.8 G/DL — SIGNIFICANT CHANGE UP (ref 32–36)
MCV RBC AUTO: 90.9 FL — SIGNIFICANT CHANGE UP (ref 80–100)
METHOD TYPE: SIGNIFICANT CHANGE UP
MONOCYTES # BLD AUTO: 0.46 K/UL — SIGNIFICANT CHANGE UP (ref 0–0.9)
MONOCYTES NFR BLD AUTO: 7.3 % — SIGNIFICANT CHANGE UP (ref 2–14)
NEUTROPHILS # BLD AUTO: 3.68 K/UL — SIGNIFICANT CHANGE UP (ref 1.8–7.4)
NEUTROPHILS NFR BLD AUTO: 58.7 % — SIGNIFICANT CHANGE UP (ref 43–77)
NRBC BLD AUTO-RTO: 0 /100 WBCS — SIGNIFICANT CHANGE UP (ref 0–0)
ORGANISM # SPEC MICROSCOPIC CNT: ABNORMAL
ORGANISM # SPEC MICROSCOPIC CNT: ABNORMAL
PHOSPHATE SERPL-MCNC: 2.9 MG/DL — SIGNIFICANT CHANGE UP (ref 2.5–4.5)
PLATELET # BLD AUTO: 139 K/UL — LOW (ref 150–400)
POTASSIUM SERPL-MCNC: 4.1 MMOL/L — SIGNIFICANT CHANGE UP (ref 3.5–5.3)
POTASSIUM SERPL-SCNC: 4.1 MMOL/L — SIGNIFICANT CHANGE UP (ref 3.5–5.3)
PROT SERPL-MCNC: 5.8 G/DL — LOW (ref 6–8.3)
PROTHROM AB SERPL-ACNC: 11.3 SEC — SIGNIFICANT CHANGE UP (ref 9.9–13.4)
RBC # BLD: 2.85 M/UL — LOW (ref 3.8–5.2)
RBC # FLD: 15 % — HIGH (ref 10.3–14.5)
SODIUM SERPL-SCNC: 144 MMOL/L — SIGNIFICANT CHANGE UP (ref 135–145)
SPECIMEN SOURCE: SIGNIFICANT CHANGE UP
WBC # BLD: 6.27 K/UL — SIGNIFICANT CHANGE UP (ref 3.8–10.5)
WBC # FLD AUTO: 6.27 K/UL — SIGNIFICANT CHANGE UP (ref 3.8–10.5)

## 2025-03-06 PROCEDURE — 45378 DIAGNOSTIC COLONOSCOPY: CPT

## 2025-03-06 PROCEDURE — 43235 EGD DIAGNOSTIC BRUSH WASH: CPT

## 2025-03-06 RX ORDER — SODIUM CHLORIDE 9 G/1000ML
1000 INJECTION, SOLUTION INTRAVENOUS
Refills: 0 | Status: DISCONTINUED | OUTPATIENT
Start: 2025-03-06 | End: 2025-03-06

## 2025-03-06 RX ORDER — CEFTRIAXONE 500 MG/1
1000 INJECTION, POWDER, FOR SOLUTION INTRAMUSCULAR; INTRAVENOUS EVERY 24 HOURS
Refills: 0 | Status: DISCONTINUED | OUTPATIENT
Start: 2025-03-06 | End: 2025-03-07

## 2025-03-06 RX ADMIN — Medication 40 MILLIGRAM(S): at 05:53

## 2025-03-06 RX ADMIN — CEFTRIAXONE 100 MILLIGRAM(S): 500 INJECTION, POWDER, FOR SOLUTION INTRAMUSCULAR; INTRAVENOUS at 07:55

## 2025-03-06 RX ADMIN — Medication 40 MILLIGRAM(S): at 17:01

## 2025-03-06 RX ADMIN — SODIUM CHLORIDE 50 MILLILITER(S): 9 INJECTION, SOLUTION INTRAVENOUS at 00:42

## 2025-03-06 RX ADMIN — INSULIN LISPRO 1: 100 INJECTION, SOLUTION INTRAVENOUS; SUBCUTANEOUS at 17:03

## 2025-03-06 NOTE — PROGRESS NOTE ADULT - PROBLEM SELECTOR PLAN 5
- UA +  - Ucx Ecoli (prelim), pending sensitivities  - started on ceftriaxone
- UA +  - F/U ucx -- in process  - no urinary symptoms  - asymptomatic bacteruria

## 2025-03-06 NOTE — PROGRESS NOTE ADULT - PROBLEM SELECTOR PLAN 8
- no longer on chemo/radiation  - follows dr Flynn
- no longer on chemo/radiation  - follows Dr Flynn

## 2025-03-06 NOTE — PROGRESS NOTE ADULT - PROBLEM/PLAN-3
I had cataract surgery to left eye 2 weeks ago, I have blurred vision to left eye
DISPLAY PLAN FREE TEXT
DISPLAY PLAN FREE TEXT

## 2025-03-06 NOTE — PROGRESS NOTE ADULT - PROBLEM SELECTOR PLAN 1
- Hb on admission was 5.2  - CTA AP not performed due to BAILEY. Will defer to GI if necessary, BAILEY now improved  - s/p 2U PRBC --> 7.2 this AM --> will transfuse 1U today  - continue IV PPI 40 BID  - GI consulted -- will start on CLD today, pending further recs  - trend CBC
- Hb on admission was 5.2 --> total 3U PRBC  - CTA AP not performed due to BAILEY. Will defer to GI if necessary, BAILEY now improved  - continue IV PPI 40 BID  - GI following -- for EGD/colo today  - hgb 8.5 today  - trend CBC

## 2025-03-06 NOTE — PROGRESS NOTE ADULT - PROBLEM SELECTOR PLAN 4
- likely 2/2 symptomatic anemia  - CTH with no acute findings  - Orthostatic negative  - TTE: EF 72%, normal LV mass, mild pulm HTN  - cards Dr Wolfe following
- likely 2/2 symptomatic anemia  - CTH with no acute findings  - F/U orthostatic VS  - F/U TTE  - cards Dr Wolfe consulted

## 2025-03-06 NOTE — CONSULT NOTE ADULT - SUBJECTIVE AND OBJECTIVE BOX
Mauricio Francis MD (Nephrology)  [address], [facility],  [address],  [id number]  TEl: [phone]  Cell: [phone]    Patient is a 74y old  Female who presents with a chief complaint of GI Bleed, symptomatic anemia (06 Mar 2025 11:18)      HPI:  73F AAOX3, Ambulates with a cane with history significant for HTN, DM, GERD and  endometrial  CA s/p hysterectomy completed chemo/radiation therapy in december presented to the ED after multiple bloody BM yesterday. Patient presented similarly in  october of last year. Patient had 4 episodes of bloody diarrhea with clots yesterday. Patient endorsing associated fatigue, weakness, abdominal pressure and dizziness over the last several days as well as one episode of syncope while in bed. Patient denies any fever, chills, dizziness ,headache, SOB, cough, chest pain, palpitations, nausea, vomiting,  urinary symptoms, lower extremity pain, or edema. Patient denies recent travel or sick contacts.  (04 Mar 2025 18:00)      PAST MEDICAL & SURGICAL HISTORY:  Chronic GERD      Internal hemorrhoid      Leiomyosarcoma of uterus      DM (diabetes mellitus)      Anemia      History of herniated intervertebral disc      HTN (hypertension)      S/P cataract surgery      S/P bunionectomy      S/P foot surgery            Allergies:  No Known Allergies      Home Medications:   acetaminophen     Tablet .. 650 milliGRAM(s) Oral every 6 hours PRN  aluminum hydroxide/magnesium hydroxide/simethicone Suspension 30 milliLiter(s) Oral every 4 hours PRN  cefTRIAXone   IVPB 1000 milliGRAM(s) IV Intermittent every 24 hours  insulin lispro (ADMELOG) corrective regimen sliding scale   SubCutaneous three times a day before meals  insulin lispro (ADMELOG) corrective regimen sliding scale   SubCutaneous at bedtime  melatonin 3 milliGRAM(s) Oral at bedtime PRN  ondansetron Injectable 4 milliGRAM(s) IV Push every 8 hours PRN  pantoprazole  Injectable 40 milliGRAM(s) IV Push every 12 hours      Hospital Medications:   MEDICATIONS  (STANDING):  cefTRIAXone   IVPB 1000 milliGRAM(s) IV Intermittent every 24 hours  insulin lispro (ADMELOG) corrective regimen sliding scale   SubCutaneous three times a day before meals  insulin lispro (ADMELOG) corrective regimen sliding scale   SubCutaneous at bedtime  pantoprazole  Injectable 40 milliGRAM(s) IV Push every 12 hours      SOCIAL HISTORY:  Denies ETOh, Smoking,     Family History:  FAMILY HISTORY:  FH: pancreatic cancer (Father)  age 77    FHx: colon cancer (Mother)  age 81          ROS:  CONSTITUTIONAL: No fever or chills.  HEENT: No headche, visual disturbances, hearing impairment.  RESPIRATORY: No shortness of breath, cough, wheezing or hemoptysis  CARDIOVASCULAR: No Chest pain or SOB  GASTROINTESTINAL: No abdominal pain, nausea, vomiting, diarrhea, hematemesis or blood per rectum.  GENITOURINARY: No flank or supra pubic pain.  NEUROLOGICAL: No headache,  focal limb weakness, tingling or numbness sensation or seizure like activity  SKIN: No rash or skin lesion  MUSCULOSKELETAL: No leg pain, calf pain or swelling  Psych: Denies suicidal or homicidal ideation    VITALS:  T(F): 98.2 (03-06-25 @ 19:18), Max: 99 (03-06-25 @ 15:36)  HR: 72 (03-06-25 @ 19:18)  BP: 108/56 (03-06-25 @ 19:18)  RR: 18 (03-06-25 @ 19:18)  SpO2: 100% (03-06-25 @ 19:18)  Wt(kg): --    03-05 @ 07:01  -  03-06 @ 07:00  --------------------------------------------------------  IN: 700 mL / OUT: 0 mL / NET: 700 mL      Height (cm): 155 (03-06 @ 14:31)  Weight (kg): 89.8 (03-06 @ 10:16)  BMI (kg/m2): 37.4 (03-06 @ 14:31)  BSA (m2): 1.88 (03-06 @ 14:31)  CAPILLARY BLOOD GLUCOSE      POCT Blood Glucose.: 191 mg/dL (06 Mar 2025 17:00)  POCT Blood Glucose.: 169 mg/dL (06 Mar 2025 11:52)  POCT Blood Glucose.: 150 mg/dL (06 Mar 2025 07:44)  POCT Blood Glucose.: 150 mg/dL (06 Mar 2025 06:09)  POCT Blood Glucose.: 92 mg/dL (06 Mar 2025 00:02)  POCT Blood Glucose.: 101 mg/dL (05 Mar 2025 21:44)  POCT Blood Glucose.: 64 mg/dL (05 Mar 2025 20:58)        PHYSICAL EXAM:  GENERAL: Alert, awake, oriented x3   HEENT: ROBBIN, EOMI, neck supple, no JVP.  CHEST/LUNG: Bilateral clear breath sounds, no rales, no crepitations, no wheezing  HEART: Regular rate and rhythm, YASSINE II/VI at LPSB, no gallops, no rub   ABDOMEN: Soft, nontender, non distended, bowel sounds present, no palpable organomegaly, no guarding, no rigidity, no rebound tenderness.  : No flank or supra pubic tenderness.  EXTREMITIES: Peripheral pulses are palpable, no pedal edema, no calf tenderness  Musculoskeletal: No joint or spinal tenderness  Neurology: AAOx3, no focal neurological deficit, Motor and sensory systems are intact.  SKIN: No rash or skin lesion    LABS:  03-06    144  |  112[H]  |  23[H]  ----------------------------<  137[H]  4.1   |  25  |  0.93    Ca    8.5      06 Mar 2025 06:29  Phos  2.9     03-06  Mg     2.1     03-06    TPro  5.8[L]  /  Alb  3.0[L]  /  TBili  0.3  /  DBili      /  AST  13  /  ALT  16  /  AlkPhos  60  03-06    Creatinine Trend: 0.93 <--, 1.16 <--, 1.50 <--                        8.5    6.27  )-----------( 139      ( 06 Mar 2025 06:29 )             25.9     Urine Studies:  Urinalysis Basic - ( 06 Mar 2025 06:29 )    Color:  / Appearance:  / SG:  / pH:   Gluc: 137 mg/dL / Ketone:   / Bili:  / Urobili:    Blood:  / Protein:  / Nitrite:    Leuk Esterase:  / RBC:  / WBC    Sq Epi:  / Non Sq Epi:  / Bacteria:           RADIOLOGY & ADDITIONAL STUDIES:  rad< from: CT Head No Cont (03.04.25 @ 21:13) >    ACC: [id number] EXAM:  CT BRAIN   ORDERED BY: HAMID BUTT     PROCEDURE DATE:  03/04/2025          INTERPRETATION:  .    CLINICAL INFORMATION: Syncopal episode.    TECHNIQUE: Multiple axial CT images of the head were obtained without   contrast. Sagittal and coronal reconstructed images were acquired from   the source data.    COMPARISON: No prior CT studies of the brain are available for comparison   at this institution.    FINDINGS: There is no acute intracranial hemorrhage, mass effect, shift   of the midline structures, herniation, extra-axial fluid collection, or   hydrocephalus.    There is mild diffuse cerebral volume loss with prominence of the sulci,   fissures, and cisternal spaces which is normal for the patient's age.   There is mild deep and periventricular white matter hypoattenuation   statistically compatible with microvascular changes given calcific   atherosclerotic disease of the intracranial arteries.    The paranasal sinuses and tympanomastoid cavities are clear. The   calvarium is intact. There is evidence of bilateral cataract removal.    IMPRESSION: No acute intracranial hemorrhage, mass effect, or shift of   the midline structures.    Mild chronic white matter microvascular type changes.    --- End of Report ---            TULIO HALL MD; Attending Radiologist  This document has been electronically signed. Mar  4 2025  9:19PM    < end of copied text >    EKG findings reviewed.    Imaging Personally Reviewed:  [x] YES  [ ] NO    Consultant(s) and primary physician Notes Reviewed:  [x] YES  [ ] NO    Care Discussed with Primary team/ Consultants/Other Providers [x] YES  [ ] NO

## 2025-03-06 NOTE — PROGRESS NOTE ADULT - PROBLEM SELECTOR PLAN 6
- Patient on oral medications at home  - continue ISS  - glucose controlled  - F/U A1c -- in process
- Patient on oral medications at home  - continue ISS  - glucose controlled  - A1c 6.6

## 2025-03-06 NOTE — PROGRESS NOTE ADULT - NS ATTEND AMEND GEN_ALL_CORE FT
73F AAOX3, Ambulates with a cane with history significant for HTN, DM, GERD and  endometrial  CA s/p hysterectomy completed chemo/radiation therapy in December presented to the ED after multiple bloody BM.  Patient admitted for GI bleed, symptomatic anemia and syncope.    T(C): 36.8 (03-06-25 @ 19:18), Max: 37.2 (03-06-25 @ 15:36)  HR: 72 (03-06-25 @ 19:18) (72 - 84)  BP: 108/56 (03-06-25 @ 19:18) (108/56 - 141/73)  RR: 18 (03-06-25 @ 19:18) (15 - 20)  SpO2: 100% (03-06-25 @ 19:18) (97% - 100%)      Reviewed labs and imaging     Acute Blood Loss Anemia   GI Bleed   Syncope     s/p PRBC transfusion   GI consulted  EGD/ Colonoscopy today     IV Abx with Rocephin for E.coli UTI  Follow ID and sensitivity    Cards consulted  DM HISS  HTN  CAD s/p stents.

## 2025-03-06 NOTE — PROGRESS NOTE ADULT - PROBLEM SELECTOR PLAN 7
- on losartan at home  - hold home medications ISO GIB and BAILEY  - BP controlled
- on losartan at home  - hold home medications ISO GIB and BAILEY  - BP controlled

## 2025-03-06 NOTE — CONSULT NOTE ADULT - ASSESSMENT
73F AAOX3, Ambulates with a cane with history significant for HTN, DM, GERD and  endometrial  CA s/p hysterectomy completed chemo/radiation therapy in december presented to the ED after multiple bloody BM yesterday. Patient presented similarly in  october of last year. Patient had 4 episodes of bloody diarrhea with clots yesterday. Nephrology consult called for BAILEY/Electrolytes disorder    Assessment:  1) Non oliguric BAILEY now resolved with stable renal function with S cr 0.9 likely pre-renal/dehydration/Gi bleeding  2) Electrolytes disorder now with improvement  3) Acute GI bleeding likely diverticular bleeding  4) Hypertension  5) DM type II  6) HLD  7) Endometrial Cancer s/p Hysterectomy s/p Chemo/radiation  8) E.Coli UTI on IV Rocephin    Recommend:  Strict I/o  Avoid nephrotoxic agents  S cr 1.5-->0.9 with non oliguria and stable electrolytes  Monitor BMP and electrolytes daily  IV/po hydration  GI team for anemia work up with EGD/Colonoscopy  S/p 3 unit PRBC with Hgb 8.5  Transfuse PBRC with goal Hgb 7 to 8 gm/DL  Optimal BP control with po medications with holding parameters  Optimal DM control with insulin therapy  Optimal pain control  IV Abx with Rocephin for E.coli UTI  Follow ID and sensitivity  Volume status and electrolytes noted  No urgent indication for HD  Will follow

## 2025-03-06 NOTE — PRE-OP CHECKLIST - NSBLOODTRANSFCONSENT_GEN_A_CORE
Pt arrived in emergency department via EMS from [facility] after an unwitnessed fall. Per EMS, it was a ground level fall onto carpet, and pt was have hx of multiple falls. No LOC according to staff from nursing home. Complaints of nausea and dizziness. Hx of Alzheimers. AOx4.  
yes

## 2025-03-06 NOTE — PROGRESS NOTE ADULT - PROBLEM SELECTOR PLAN 10
- pt is from home  - pending further GI recs, monitor for further bleeding and if hgb remains stable
- pt is from home  - for EGD/colonoscopy today

## 2025-03-07 VITALS
SYSTOLIC BLOOD PRESSURE: 127 MMHG | DIASTOLIC BLOOD PRESSURE: 79 MMHG | OXYGEN SATURATION: 100 % | TEMPERATURE: 98 F | RESPIRATION RATE: 18 BRPM | HEART RATE: 82 BPM

## 2025-03-07 LAB
ALBUMIN SERPL ELPH-MCNC: 2.8 G/DL — LOW (ref 3.5–5)
ALP SERPL-CCNC: 66 U/L — SIGNIFICANT CHANGE UP (ref 40–120)
ALT FLD-CCNC: 19 U/L DA — SIGNIFICANT CHANGE UP (ref 10–60)
ANION GAP SERPL CALC-SCNC: 4 MMOL/L — LOW (ref 5–17)
AST SERPL-CCNC: 19 U/L — SIGNIFICANT CHANGE UP (ref 10–40)
BASOPHILS # BLD AUTO: 0.01 K/UL — SIGNIFICANT CHANGE UP (ref 0–0.2)
BASOPHILS NFR BLD AUTO: 0.2 % — SIGNIFICANT CHANGE UP (ref 0–2)
BILIRUB SERPL-MCNC: 0.2 MG/DL — SIGNIFICANT CHANGE UP (ref 0.2–1.2)
BUN SERPL-MCNC: 15 MG/DL — SIGNIFICANT CHANGE UP (ref 7–18)
CALCIUM SERPL-MCNC: 8.4 MG/DL — SIGNIFICANT CHANGE UP (ref 8.4–10.5)
CHLORIDE SERPL-SCNC: 109 MMOL/L — HIGH (ref 96–108)
CO2 SERPL-SCNC: 26 MMOL/L — SIGNIFICANT CHANGE UP (ref 22–31)
CREAT SERPL-MCNC: 0.96 MG/DL — SIGNIFICANT CHANGE UP (ref 0.5–1.3)
EGFR: 63 ML/MIN/1.73M2 — SIGNIFICANT CHANGE UP
EGFR: 63 ML/MIN/1.73M2 — SIGNIFICANT CHANGE UP
EOSINOPHIL # BLD AUTO: 0.07 K/UL — SIGNIFICANT CHANGE UP (ref 0–0.5)
EOSINOPHIL NFR BLD AUTO: 1.3 % — SIGNIFICANT CHANGE UP (ref 0–6)
GLUCOSE BLDC GLUCOMTR-MCNC: 161 MG/DL — HIGH (ref 70–99)
GLUCOSE SERPL-MCNC: 175 MG/DL — HIGH (ref 70–99)
HCT VFR BLD CALC: 25.4 % — LOW (ref 34.5–45)
HGB BLD-MCNC: 8.3 G/DL — LOW (ref 11.5–15.5)
IMM GRANULOCYTES NFR BLD AUTO: 0.7 % — SIGNIFICANT CHANGE UP (ref 0–0.9)
LYMPHOCYTES # BLD AUTO: 1.48 K/UL — SIGNIFICANT CHANGE UP (ref 1–3.3)
LYMPHOCYTES # BLD AUTO: 27.7 % — SIGNIFICANT CHANGE UP (ref 13–44)
MAGNESIUM SERPL-MCNC: 1.8 MG/DL — SIGNIFICANT CHANGE UP (ref 1.6–2.6)
MCHC RBC-ENTMCNC: 29.6 PG — SIGNIFICANT CHANGE UP (ref 27–34)
MCHC RBC-ENTMCNC: 32.7 G/DL — SIGNIFICANT CHANGE UP (ref 32–36)
MCV RBC AUTO: 90.7 FL — SIGNIFICANT CHANGE UP (ref 80–100)
MONOCYTES # BLD AUTO: 0.33 K/UL — SIGNIFICANT CHANGE UP (ref 0–0.9)
MONOCYTES NFR BLD AUTO: 6.2 % — SIGNIFICANT CHANGE UP (ref 2–14)
NEUTROPHILS # BLD AUTO: 3.42 K/UL — SIGNIFICANT CHANGE UP (ref 1.8–7.4)
NEUTROPHILS NFR BLD AUTO: 63.9 % — SIGNIFICANT CHANGE UP (ref 43–77)
NRBC BLD AUTO-RTO: 0 /100 WBCS — SIGNIFICANT CHANGE UP (ref 0–0)
PHOSPHATE SERPL-MCNC: 3.4 MG/DL — SIGNIFICANT CHANGE UP (ref 2.5–4.5)
PLATELET # BLD AUTO: 132 K/UL — LOW (ref 150–400)
POTASSIUM SERPL-MCNC: 4.1 MMOL/L — SIGNIFICANT CHANGE UP (ref 3.5–5.3)
POTASSIUM SERPL-SCNC: 4.1 MMOL/L — SIGNIFICANT CHANGE UP (ref 3.5–5.3)
PROT SERPL-MCNC: 5.8 G/DL — LOW (ref 6–8.3)
RBC # BLD: 2.8 M/UL — LOW (ref 3.8–5.2)
RBC # FLD: 14.6 % — HIGH (ref 10.3–14.5)
SODIUM SERPL-SCNC: 139 MMOL/L — SIGNIFICANT CHANGE UP (ref 135–145)
WBC # BLD: 5.35 K/UL — SIGNIFICANT CHANGE UP (ref 3.8–10.5)
WBC # FLD AUTO: 5.35 K/UL — SIGNIFICANT CHANGE UP (ref 3.8–10.5)

## 2025-03-07 PROCEDURE — 80053 COMPREHEN METABOLIC PANEL: CPT

## 2025-03-07 PROCEDURE — 83735 ASSAY OF MAGNESIUM: CPT

## 2025-03-07 PROCEDURE — 84100 ASSAY OF PHOSPHORUS: CPT

## 2025-03-07 PROCEDURE — 86850 RBC ANTIBODY SCREEN: CPT

## 2025-03-07 PROCEDURE — 81001 URINALYSIS AUTO W/SCOPE: CPT

## 2025-03-07 PROCEDURE — 86900 BLOOD TYPING SEROLOGIC ABO: CPT

## 2025-03-07 PROCEDURE — 86901 BLOOD TYPING SEROLOGIC RH(D): CPT

## 2025-03-07 PROCEDURE — 70450 CT HEAD/BRAIN W/O DYE: CPT | Mod: MC

## 2025-03-07 PROCEDURE — 85730 THROMBOPLASTIN TIME PARTIAL: CPT

## 2025-03-07 PROCEDURE — 86923 COMPATIBILITY TEST ELECTRIC: CPT

## 2025-03-07 PROCEDURE — 85025 COMPLETE CBC W/AUTO DIFF WBC: CPT

## 2025-03-07 PROCEDURE — 85027 COMPLETE CBC AUTOMATED: CPT

## 2025-03-07 PROCEDURE — 82962 GLUCOSE BLOOD TEST: CPT

## 2025-03-07 PROCEDURE — 36415 COLL VENOUS BLD VENIPUNCTURE: CPT

## 2025-03-07 PROCEDURE — 87086 URINE CULTURE/COLONY COUNT: CPT

## 2025-03-07 PROCEDURE — 83036 HEMOGLOBIN GLYCOSYLATED A1C: CPT

## 2025-03-07 PROCEDURE — 96374 THER/PROPH/DIAG INJ IV PUSH: CPT

## 2025-03-07 PROCEDURE — 93306 TTE W/DOPPLER COMPLETE: CPT

## 2025-03-07 PROCEDURE — 36430 TRANSFUSION BLD/BLD COMPNT: CPT

## 2025-03-07 PROCEDURE — 87186 SC STD MICRODIL/AGAR DIL: CPT

## 2025-03-07 PROCEDURE — 99285 EMERGENCY DEPT VISIT HI MDM: CPT

## 2025-03-07 PROCEDURE — 85610 PROTHROMBIN TIME: CPT

## 2025-03-07 PROCEDURE — 93005 ELECTROCARDIOGRAM TRACING: CPT

## 2025-03-07 PROCEDURE — 99232 SBSQ HOSP IP/OBS MODERATE 35: CPT

## 2025-03-07 PROCEDURE — P9040: CPT

## 2025-03-07 RX ORDER — CEFUROXIME SODIUM 1.5 G
1 VIAL (EA) INJECTION
Qty: 6 | Refills: 0
Start: 2025-03-07 | End: 2025-03-09

## 2025-03-07 RX ORDER — LOSARTAN POTASSIUM AND HYDROCHLOROTHIAZIDE 12.5; 5 MG/1; MG/1
1 TABLET ORAL
Refills: 0 | DISCHARGE

## 2025-03-07 RX ADMIN — Medication 40 MILLIGRAM(S): at 05:56

## 2025-03-07 RX ADMIN — INSULIN LISPRO 1: 100 INJECTION, SOLUTION INTRAVENOUS; SUBCUTANEOUS at 08:28

## 2025-03-07 RX ADMIN — CEFTRIAXONE 100 MILLIGRAM(S): 500 INJECTION, POWDER, FOR SOLUTION INTRAMUSCULAR; INTRAVENOUS at 06:22

## 2025-03-07 NOTE — DISCHARGE NOTE PROVIDER - HOSPITAL COURSE
73F AAOX3, Ambulates with a cane with history of HTN, DM, GERD and endometrial CA s/p hysterectomy completed chemo/radiation therapy in December presented to the ED after multiple bloody BM yesterday. Patient presented similarly in October of last year. Patient had 4 episodes of bloody diarrhea with clots on 3/3. Symptoms associated with fatigue, weakness, abdominal pressure and dizziness over the last several days as well as one episode of syncope while in bed. Pt is admitted for ?GIB, symptomatic anemia and ?syncope    CTH negative for acute findings. Was not able to do CTA 2/2 BAILEY on admission. GI consulted for GIB. hgb was 5.2 on admission, s/p 3U PRBC total this hospital stay. Dr Wolfe consulted, echo showing EF 72%, normal LV mass, mild pulm HTN  Pt is awake, alert, no bloody BM, hgb this morning stable. s/p EGD/colo on 3/6 showed Excavated lesions Multiple diverticula were seen in the whole colon. Diverticulosis appeared to be severe. Protruding lesions Grade/Stage I hemorrhoids were noted. Severe diverticulosis of the whole colon. Grade/Stage I hemorrhoids.    Pt is medically optimized for discharge, discussed with the attending Dr Shanks   73F AAOX3, Ambulates with a cane with history of HTN, DM, GERD and endometrial CA s/p hysterectomy completed chemo/radiation therapy in December presented to the ED after multiple bloody BM yesterday. Patient presented similarly in October of last year. Patient had 4 episodes of bloody diarrhea with clots on 3/3. Symptoms associated with fatigue, weakness, abdominal pressure and dizziness over the last several days as well as one episode of syncope while in bed. Pt is admitted for ?GIB, symptomatic anemia and ?syncope    CTH negative for acute findings. Was not able to do CTA 2/2 BAILEY on admission. GI consulted for GIB. hgb was 5.2 on admission, s/p 3U PRBC total this hospital stay. Dr Wolfe consulted, echo showing EF 72%, normal LV mass, mild pulm HTN  Pt is awake, alert, no bloody BM, hgb this morning stable. s/p EGD/colo on 3/6 showed Excavated lesions Multiple diverticula were seen in the whole colon. Diverticulosis appeared to be severe. Protruding lesions Grade/Stage I hemorrhoids were noted. Severe diverticulosis of the whole colon. Grade/Stage I hemorrhoids. High Fiber Diet. No active GIB, rectal bleeding hemorrhoidal vs diverticular now resolved    Pt is medically optimized for discharge, discussed with the attending Dr Shanks

## 2025-03-07 NOTE — PROGRESS NOTE ADULT - REASON FOR ADMISSION
GI Bleed, symptomatic anemia

## 2025-03-07 NOTE — DISCHARGE NOTE NURSING/CASE MANAGEMENT/SOCIAL WORK - NSDCPEFALRISK_GEN_ALL_CORE
For information on Fall & Injury Prevention, visit: [email or web address] OR  [email or web address] OR  https://www.cdc.gov/steadi/patient.html

## 2025-03-07 NOTE — DISCHARGE NOTE PROVIDER - NSDCFUSCHEDAPPT_GEN_ALL_CORE_FT
Allyn Bacon  [facility]  [address] [phone]  Scheduled Appointment: 03/24/2025    Koki Bonilla  [facility]  GASTR[address]  Scheduled Appointment: 04/28/2025    Marcell Butterfield  [facility]  ORTHOSURG [address] [facility]  Scheduled Appointment: 05/20/2025

## 2025-03-07 NOTE — PROGRESS NOTE ADULT - SUBJECTIVE AND OBJECTIVE BOX
C A R D I O L O G Y  **********************************     DATE OF SERVICE: 03-06-25    Patient denies chest pain or shortness of breath.   Review of systems otherwise (-)  	  MEDICATIONS:  MEDICATIONS  (STANDING):  cefTRIAXone   IVPB 1000 milliGRAM(s) IV Intermittent every 24 hours  dextrose 5% + sodium chloride 0.9%. 1000 milliLiter(s) (50 mL/Hr) IV Continuous <Continuous>  insulin lispro (ADMELOG) corrective regimen sliding scale   SubCutaneous three times a day before meals  insulin lispro (ADMELOG) corrective regimen sliding scale   SubCutaneous at bedtime  pantoprazole  Injectable 40 milliGRAM(s) IV Push every 12 hours      LABS:	 	    CARDIAC MARKERS:                            8.5    6.27  )-----------( 139      ( 06 Mar 2025 06:29 )             25.9     Hemoglobin: 8.5 g/dL (03-06 @ 06:29)  Hemoglobin: 9.1 g/dL (03-05 @ 23:32)  Hemoglobin: 7.2 g/dL (03-05 @ 05:25)  Hemoglobin: 6.9 g/dL (03-04 @ 22:49)  Hemoglobin: 5.2 g/dL (03-04 @ 16:25)      03-06    144  |  112[H]  |  23[H]  ----------------------------<  137[H]  4.1   |  25  |  0.93    Ca    8.5      06 Mar 2025 06:29  Phos  2.9     03-06  Mg     2.1     03-06    TPro  5.8[L]  /  Alb  3.0[L]  /  TBili  0.3  /  DBili  x   /  AST  13  /  ALT  16  /  AlkPhos  60  03-06    Creatinine Trend: 0.93<--, 1.16<--, 1.50<--    COAGS:   PT/INR - ( 06 Mar 2025 06:29 )   PT: 11.3 sec;   INR: 0.97 ratio         PTT - ( 06 Mar 2025 06:29 )  PTT:32.1 sec        PHYSICAL EXAM:  T(C): 36.6 (03-06-25 @ 07:30), Max: 37.1 (03-05-25 @ 23:14)  HR: 77 (03-06-25 @ 07:30) (73 - 101)  BP: 126/67 (03-06-25 @ 07:30) (118/53 - 136/55)  RR: 18 (03-06-25 @ 07:30) (16 - 18)  SpO2: 98% (03-06-25 @ 07:30) (98% - 100%)  Wt(kg): --  I&O's Summary    05 Mar 2025 07:01  -  06 Mar 2025 07:00  --------------------------------------------------------  IN: 700 mL / OUT: 0 mL / NET: 700 mL          Gen: Appears well in NAD  HEENT:  (-)icterus (-)pallor  CV: N S1 S2 1/6 YASSINE (+)2 Pulses B/l  Resp:  Clear to ausculatation B/L, normal effort  GI: (+) BS Soft, NT, ND  Lymph:  (-)Edema, (-)obvious lymphadenopathy  Skin: Warm to touch, Normal turgor  Psych: Appropriate mood and affect      TELEMETRY: 	sinus          ASSESSMENT/PLAN: 	74y  Female  history significant for HTN, DM, GERD and  endometrial  CA s/p hysterectomy completed chemo/radiation therapy in december presented to the ED after multiple bloody BM yesterday weakness and LOC.    # ? syncope  - unclear if patient lost consciousness as she was laying down in bed  - Suspect hemodynamically meidated event in the setting of profound anemia  - check orthostatic BP   - Echo with normal lV fx   - narrow QRS on EKG is reassuring  - no pertinent findings on tele thus far    # Gi Bleed  - GI f/u  - monitor H/H  - for EGD/ colonoscopy today, optimized from a cardiac prospective for endoscopic procedures    Napoleon Wolfe MD, [facility]  BEEPER [phone]    
    Chief Complaint:  Patient is a 74y old  Female who presents with a chief complaint of GI Bleed, symptomatic anemia (07 Mar 2025 09:32)      HPI/ 24 hr events: Patient seen and examined at bedside. Pt feeling well this morning. Tolerating diet. She denies nausea, vomiting or abdominal pain. She denies further bleeding. HGB 8.3    MEDICATIONS:   MEDICATIONS  (STANDING):  cefTRIAXone   IVPB 1000 milliGRAM(s) IV Intermittent every 24 hours  insulin lispro (ADMELOG) corrective regimen sliding scale   SubCutaneous three times a day before meals  insulin lispro (ADMELOG) corrective regimen sliding scale   SubCutaneous at bedtime  pantoprazole  Injectable 40 milliGRAM(s) IV Push every 12 hours    MEDICATIONS  (PRN):  acetaminophen     Tablet .. 650 milliGRAM(s) Oral every 6 hours PRN Temp greater or equal to 38C (100.4F), Mild Pain (1 - 3)  aluminum hydroxide/magnesium hydroxide/simethicone Suspension 30 milliLiter(s) Oral every 4 hours PRN Dyspepsia  melatonin 3 milliGRAM(s) Oral at bedtime PRN Insomnia  ondansetron Injectable 4 milliGRAM(s) IV Push every 8 hours PRN Nausea and/or Vomiting      ALLERGIES:   Allergies    No Known Allergies    Intolerances        VITAL SIGNS:   Vital Signs Last 24 Hrs  T(C): 36.5 (07 Mar 2025 08:30), Max: 37.2 (06 Mar 2025 15:36)  T(F): 97.7 (07 Mar 2025 08:30), Max: 99 (06 Mar 2025 15:36)  HR: 71 (07 Mar 2025 08:30) (71 - 84)  BP: 151/65 (07 Mar 2025 08:30) (103/91 - 151/65)  BP(mean): 93 (06 Mar 2025 14:40) (76 - 96)  RR: 18 (07 Mar 2025 08:30) (15 - 20)  SpO2: 100% (07 Mar 2025 08:30) (96% - 100%)    Parameters below as of 07 Mar 2025 08:30  Patient On (Oxygen Delivery Method): room air      I&O's Summary      PHYSICAL EXAM:   GENERAL:  No acute distress  HEENT:  NC/AT, conjunctiva clear, sclera anicteric  CHEST:  No increased effort, breath sounds clear  HEART:  Regular rate  ABDOMEN:  Soft, non-tender, non-distended, normoactive bowel sounds, no rebound or guarding  EXTREMITIES: No edema  SKIN:  Warm, dry  NEURO:  Calm, cooperative    LABS:                        8.3    5.35  )-----------( 132      ( 07 Mar 2025 07:25 )             25.4     03-07    139  |  109[H]  |  15  ----------------------------<  175[H]  4.1   |  26  |  0.96    Ca    8.4      07 Mar 2025 07:25  Phos  3.4     03-07  Mg     1.8     03-07    TPro  5.8[L]  /  Alb  2.8[L]  /  TBili  0.2  /  DBili  x   /  AST  19  /  ALT  19  /  AlkPhos  66  03-07    LIVER FUNCTIONS - ( 07 Mar 2025 07:25 )  Alb: 2.8 g/dL / Pro: 5.8 g/dL / ALK PHOS: 66 U/L / ALT: 19 U/L DA / AST: 19 U/L / GGT: x           PT/INR - ( 06 Mar 2025 06:29 )   PT: 11.3 sec;   INR: 0.97 ratio         PTT - ( 06 Mar 2025 06:29 )  PTT:32.1 sec  Urinalysis Basic - ( 07 Mar 2025 07:25 )    Color: x / Appearance: x / SG: x / pH: x  Gluc: 175 mg/dL / Ketone: x  / Bili: x / Urobili: x   Blood: x / Protein: x / Nitrite: x   Leuk Esterase: x / RBC: x / WBC x   Sq Epi: x / Non Sq Epi: x / Bacteria: x        Culture - Urine (collected 04 Mar 2025 22:53)  Source: Catheterized  Final Report (06 Mar 2025 23:52):    >100,000 CFU/ml Escherichia coli  Organism: Escherichia coli (06 Mar 2025 23:52)  Organism: Escherichia coli (06 Mar 2025 23:52)    Urinalysis with Rflx Culture (collected 04 Mar 2025 22:53)                                  RADIOLOGY & ADDITIONAL STUDIES:          EGD-Colonoscopy Report        Date: 3/6/2025 11:00 AM        Patient Name: CARLINE TAYLOR        MRN: [id number]        Account Number:        [id number]        Gender: Female         (age): 1950 (74)        EGD Instrument(s):        GIFHQ 190 ([id number])([id number])        Colonoscopy Instrument(s):        [id number] 190 ([id number])([id number])        Attending/Fellow:        Aditi Ann                Procedure Room #:        PROCEDURE ROOM 2                Nurse(s):        Rekha Booth RN        ASA Class:        P3 - 3/6/2025 2:21 PM Aditi Ann        History of Present Illness:        H&P was previously reviewed.        Administered Medications:        As per Anesthesiology Record        EGD Indications:        Acute blood loss anemia: 285.1 - D62        Colonoscopy Indications:        Acute blood loss anemia: 285.1 - D62        General Procedure:        The procedure, indications, preparation and potential complications were    explained to the patient, who indicated understanding and signed the    corresponding consent forms. MAC was administered. Continuous pulse oximetry and    blood pressure monitoring were used throughout the procedure. Supplemental    oxygen was used.        EGD        EGD Procedure:        Patient was placed in left lateral decubitus position. The endoscope was    introduced through mouth and advanced under direct visualization until second    part of the duodenum reached. Patient tolerance to the procedure was good. The    procedure was not difficult.        EGD Findings:        Esophagus Normal esophagus.        Stomach Normal stomach.        Duodenum Normal duodenum.        EGD Impressions:        Normal esophagus.        Normal stomach.        Normal duodenum.        EGD Limitations/Complications:        There were no apparent limitations or complications        Colonoscopy        Colonoscopy Procedure:        This is a  high risk patient  undergoing diagnostic colonoscopy. Prior    colonoscopy was performed five to ten years ago.  The quality of preparation was    Good. Patient was placed in left lateral decubitus position. The colonoscope was    introduced through rectum and advanced under direct visualization until cecum    reached. The appendiceal orifice and the ileocecal valve were identified. The    colonoscope was retroflexed within the rectum. Careful visualization was    performed as the instrument was withdrawn. Patient tolerance to the procedure    was excellent. The procedure was not difficult. Digital exam was normal.        [place] Bowel Preparation Score:        Using the [place] Bowel Preparation Score, each segment of the colon was graded    as follows:        Left Colon: Good, 2 | Transverse Colon: Good, 2  | Right Colon: Good, 2        Colonoscopy Findings:        Excavated lesions Multiple diverticula were seen in the whole colon.    Diverticulosis appeared to be severe.        Protruding lesions Grade/Stage I hemorrhoids were noted.        Colonoscopy Impressions:        Severe diverticulosis of the whole colon.        Grade/Stage I hemorrhoids.                Colonoscopy Limitations/Complications:        There were no apparent limitations or complications        Plan:        Return to floor for further management        Advance diet as tolerated        High Fiber Diet        Avoid nsaids        No active GIB, rectal bleeding hemorrhoidal vs diverticular now resolved        No further Colonoscopy needed        Aditi Ann        Version 1, Electronically signed on 3/6/2025 2:24:51 PM by Aditi Ann  
C A R D I O L O G Y  **********************************     DATE OF SERVICE: 03-07-25    Patient denies chest pain or shortness of breath.   Review of symptoms otherwise negative.    acetaminophen     Tablet .. 650 milliGRAM(s) Oral every 6 hours PRN  aluminum hydroxide/magnesium hydroxide/simethicone Suspension 30 milliLiter(s) Oral every 4 hours PRN  cefTRIAXone   IVPB 1000 milliGRAM(s) IV Intermittent every 24 hours  insulin lispro (ADMELOG) corrective regimen sliding scale   SubCutaneous three times a day before meals  insulin lispro (ADMELOG) corrective regimen sliding scale   SubCutaneous at bedtime  melatonin 3 milliGRAM(s) Oral at bedtime PRN  ondansetron Injectable 4 milliGRAM(s) IV Push every 8 hours PRN  pantoprazole  Injectable 40 milliGRAM(s) IV Push every 12 hours                            8.3    5.35  )-----------( 132      ( 07 Mar 2025 07:25 )             25.4       Hemoglobin: 8.3 g/dL (03-07 @ 07:25)  Hemoglobin: 8.5 g/dL (03-06 @ 06:29)  Hemoglobin: 9.1 g/dL (03-05 @ 23:32)  Hemoglobin: 7.2 g/dL (03-05 @ 05:25)  Hemoglobin: 6.9 g/dL (03-04 @ 22:49)      03-07    139  |  109[H]  |  15  ----------------------------<  175[H]  4.1   |  26  |  0.96    Ca    8.4      07 Mar 2025 07:25  Phos  3.4     03-07  Mg     1.8     03-07    TPro  5.8[L]  /  Alb  2.8[L]  /  TBili  0.2  /  DBili  x   /  AST  19  /  ALT  19  /  AlkPhos  66  03-07    Creatinine Trend: 0.96<--, 0.93<--, 1.16<--, 1.50<--    COAGS:           T(C): 36.5 (03-07-25 @ 08:30), Max: 37.2 (03-06-25 @ 15:36)  HR: 71 (03-07-25 @ 08:30) (71 - 84)  BP: 151/65 (03-07-25 @ 08:30) (103/91 - 151/65)  RR: 18 (03-07-25 @ 08:30) (15 - 20)  SpO2: 100% (03-07-25 @ 08:30) (96% - 100%)  Wt(kg): --    I&O's Summary        Gen: Appears well in NAD  HEENT:  (-)icterus (-)pallor  CV: N S1 S2 1/6 YASSINE (+)2 Pulses B/l  Resp:  Clear to ausculatation B/L, normal effort  GI: (+) BS Soft, NT, ND  Lymph:  (-)Edema, (-)obvious lymphadenopathy  Skin: Warm to touch, Normal turgor  Psych: Appropriate mood and affect      TELEMETRY: 	off        ASSESSMENT/PLAN: 	74y  Female  history significant for HTN, DM, GERD and  endometrial  CA s/p hysterectomy completed chemo/radiation therapy in december presented to the ED after multiple bloody BM yesterday weakness and LOC.    # ? syncope  - unclear if patient lost consciousness as she was laying down in bed  - Suspect hemodynamically meidated event in the setting of profound anemia  - Echo with normal lV fx   - narrow QRS on EKG is reassuring  - outpt cardiac f/u [phone]    # Gi Bleed  - GI f/u  - monitor H/H  - s/p EGD/ colonoscopy appears to be diverticular Hemorroidal bleeding per GI     Napoleon Wolfe MD, [facility]  BEEPER [phone]    
Mauricio Francis MD (Nephrology progress note)  [address] [facility],  [id number],  [id number]  TEl: [phone]  Cell: [phone]    Patient is a 74y Female seen and evaluated at bedside. Vital signs, laboratory data, imaging studies, consult notes reviewed done within past 24 hours. Overnight events noted. Patient sitting in bed in no respiratory distress offers no new complains. Interval S cr 0.9 with non oliguria and stable electrolytes. No active bleeding with Hgb 8.3     Allergies    No Known Allergies    Intolerances    lactose (Other)      ROS:  CONSTITUTIONAL: No fever or chills.  HEENT: No headcahe or visual disturbances.  RESPIRATORY: No shortness of breath, cough, hemoptysis or wheezing  CARDIOVASCULAR: No Chest pain or SOB  GASTROINTESTINAL: No abdominal pain, nausea, vomiting, diarrhea, hematemesis or blood per rectum.  GENITOURINARY: No flank or supra pubic pain  NEUROLOGICAL: No headache, focal limb weakness, tingling or numbness  SKIN: No skin rash or lesion  MUSCULOSKELETAL: No leg pain, calf pain or swelling    VITALS:    T(C): 36.9 (03-07-25 @ 11:09), Max: 37.2 (03-06-25 @ 15:36)  HR: 82 (03-07-25 @ 11:09) (71 - 82)  BP: 127/79 (03-07-25 @ 11:09) (103/91 - 151/65)  RR: 18 (03-07-25 @ 11:09) (18 - 18)  SpO2: 100% (03-07-25 @ 11:09) (96% - 100%)  CAPILLARY BLOOD GLUCOSE      POCT Blood Glucose.: 161 mg/dL (07 Mar 2025 08:01)  POCT Blood Glucose.: 198 mg/dL (06 Mar 2025 21:20)  POCT Blood Glucose.: 191 mg/dL (06 Mar 2025 17:00)      03-07-25 @ 07:01  -  03-07-25 @ 15:06  --------------------------------------------------------  IN: 250 mL / OUT: 0 mL / NET: 250 mL      MEDICATIONS  (STANDING):  cefTRIAXone   IVPB 1000 milliGRAM(s) IV Intermittent every 24 hours  insulin lispro (ADMELOG) corrective regimen sliding scale   SubCutaneous three times a day before meals  insulin lispro (ADMELOG) corrective regimen sliding scale   SubCutaneous at bedtime  pantoprazole  Injectable 40 milliGRAM(s) IV Push every 12 hours    MEDICATIONS  (PRN):  acetaminophen     Tablet .. 650 milliGRAM(s) Oral every 6 hours PRN Temp greater or equal to 38C (100.4F), Mild Pain (1 - 3)  aluminum hydroxide/magnesium hydroxide/simethicone Suspension 30 milliLiter(s) Oral every 4 hours PRN Dyspepsia  melatonin 3 milliGRAM(s) Oral at bedtime PRN Insomnia  ondansetron Injectable 4 milliGRAM(s) IV Push every 8 hours PRN Nausea and/or Vomiting      PHYSICAL EXAM:  GENERAL: Alert, awake and oriented x3 in no distress  HEENT: ROBBIN, EOMI, neck supple, no JVP, no carotid bruit, oral mucosa moist and pale  CHEST/LUNG: Bilateral clear breath sounds, no rales, no crepitations, no rhonchi, no wheezing  HEART: Regular rate and rhythm, YASSINE II/VI at LPSB, no gallops, no rub   ABDOMEN: Soft, nontender, non distended, bowel sounds present, no palpable organomegaly  : No flank or supra pubic tenderness.  EXTREMITIES: Peripheral pulses are palpable, no pedal edema  Neurology: AAOx3, no focal neurological deficit  SKIN: No rash or skin lesion  Musculoskeletal: No joint deformity or spinal tenderness.      Vascular ACCESS:     LABS:                        8.3    5.35  )-----------( 132      ( 07 Mar 2025 07:25 )             25.4     03-07    139  |  109[H]  |  15  ----------------------------<  175[H]  4.1   |  26  |  0.96    Ca    8.4      07 Mar 2025 07:25  Phos  3.4     03-07  Mg     1.8     03-07    TPro  5.8[L]  /  Alb  2.8[L]  /  TBili  0.2  /  DBili  x   /  AST  19  /  ALT  19  /  AlkPhos  66  03-07      PT/INR - ( 06 Mar 2025 06:29 )   PT: 11.3 sec;   INR: 0.97 ratio         PTT - ( 06 Mar 2025 06:29 )  PTT:32.1 sec  Urinalysis Basic - ( 07 Mar 2025 07:25 )    Color: x / Appearance: x / SG: x / pH: x  Gluc: 175 mg/dL / Ketone: x  / Bili: x / Urobili: x   Blood: x / Protein: x / Nitrite: x   Leuk Esterase: x / RBC: x / WBC x   Sq Epi: x / Non Sq Epi: x / Bacteria: x            RADIOLOGY & ADDITIONAL STUDIES:    Imaging Personally Reviewed:  [x] YES  [ ] NO    Consultant(s) Notes Reviewed:  [x] YES  [ ] NO    Care Discussed with Primary team/ Other Providers [x] YES  [ ] NO      
NP Note discussed with  Primary Attending    Patient is a 74y old  Female who presents with a chief complaint of GI Bleed, symptomatic anemia (06 Mar 2025 09:33)      INTERVAL HPI/OVERNIGHT EVENTS:  73F AAOX3, Ambulates with a cane with history of HTN, DM, GERD and endometrial CA s/p hysterectomy completed chemo/radiation therapy in December presented to the ED after multiple bloody BM yesterday. Patient presented similarly in October of last year. Patient had 4 episodes of bloody diarrhea with clots on 3/3. Symptoms associated with fatigue, weakness, abdominal pressure and dizziness over the last several days as well as one episode of syncope while in bed. Pt is admitted for ?GIB, symptomatic anemia and ?syncope    CTH negative for acute findings. Was not able to do CTA 2/2 BAILEY on admission. GI consulted for GIB. hgb was 5.2 on admission, s/p 3U PRBC total this hospital stay. Dr Wolfe consulted, echo showing EF 72%, normal LV mass, mild pulm HTN  Pt is awake, alert, continues to have BM mixed with blood overnight, hgb this morning stable. Pt is for EGD/colonoscopy today    MEDICATIONS  (STANDING):  cefTRIAXone   IVPB 1000 milliGRAM(s) IV Intermittent every 24 hours  dextrose 5% + sodium chloride 0.9%. 1000 milliLiter(s) (50 mL/Hr) IV Continuous <Continuous>  insulin lispro (ADMELOG) corrective regimen sliding scale   SubCutaneous three times a day before meals  insulin lispro (ADMELOG) corrective regimen sliding scale   SubCutaneous at bedtime  pantoprazole  Injectable 40 milliGRAM(s) IV Push every 12 hours    MEDICATIONS  (PRN):  acetaminophen     Tablet .. 650 milliGRAM(s) Oral every 6 hours PRN Temp greater or equal to 38C (100.4F), Mild Pain (1 - 3)  aluminum hydroxide/magnesium hydroxide/simethicone Suspension 30 milliLiter(s) Oral every 4 hours PRN Dyspepsia  melatonin 3 milliGRAM(s) Oral at bedtime PRN Insomnia  ondansetron Injectable 4 milliGRAM(s) IV Push every 8 hours PRN Nausea and/or Vomiting      __________________________________________________  REVIEW OF SYSTEMS:    CONSTITUTIONAL: No fever,   EYES: no acute visual disturbances  NECK: No pain or stiffness  RESPIRATORY: No cough; No shortness of breath  CARDIOVASCULAR: No chest pain, no palpitations  GASTROINTESTINAL: No pain. No nausea or vomiting; No diarrhea   NEUROLOGICAL: No headache or numbness, no tremors  MUSCULOSKELETAL: No joint pain, no muscle pain  GENITOURINARY: no dysuria, no frequency, no hesitancy  ALL OTHER  ROS negative        Vital Signs Last 24 Hrs  T(C): 36.8 (06 Mar 2025 11:13), Max: 37.1 (05 Mar 2025 23:14)  T(F): 98.2 (06 Mar 2025 11:13), Max: 98.8 (05 Mar 2025 23:14)  HR: 84 (06 Mar 2025 11:13) (73 - 101)  BP: 132/57 (06 Mar 2025 11:13) (119/62 - 136/55)  BP(mean): 79 (06 Mar 2025 11:13) (79 - 84)  RR: 18 (06 Mar 2025 11:13) (18 - 18)  SpO2: 100% (06 Mar 2025 11:13) (98% - 100%)    Parameters below as of 06 Mar 2025 11:13  Patient On (Oxygen Delivery Method): room air        ________________________________________________  PHYSICAL EXAM:  GENERAL: NAD  HEENT: Normocephalic; moist mucous membranes;   NECK : supple  CHEST/LUNG: Clear to auscultation bilaterally with good air entry   HEART: S1 S2  regular;  ABDOMEN: Soft, Nontender, Nondistended; Bowel sounds present  EXTREMITIES: no cyanosis; no edema; no calf tenderness  SKIN: warm and dry; no rash  NERVOUS SYSTEM:  Awake and alert; Oriented to place, person and time    _________________________________________________  LABS:                        8.5    6.27  )-----------( 139      ( 06 Mar 2025 06:29 )             25.9     03-06    144  |  112[H]  |  23[H]  ----------------------------<  137[H]  4.1   |  25  |  0.93    Ca    8.5      06 Mar 2025 06:29  Phos  2.9     03-06  Mg     2.1     03-06    TPro  5.8[L]  /  Alb  3.0[L]  /  TBili  0.3  /  DBili  x   /  AST  13  /  ALT  16  /  AlkPhos  60  03-06    PT/INR - ( 06 Mar 2025 06:29 )   PT: 11.3 sec;   INR: 0.97 ratio         PTT - ( 06 Mar 2025 06:29 )  PTT:32.1 sec  Urinalysis Basic - ( 06 Mar 2025 06:29 )    Color: x / Appearance: x / SG: x / pH: x  Gluc: 137 mg/dL / Ketone: x  / Bili: x / Urobili: x   Blood: x / Protein: x / Nitrite: x   Leuk Esterase: x / RBC: x / WBC x   Sq Epi: x / Non Sq Epi: x / Bacteria: x      CAPILLARY BLOOD GLUCOSE      POCT Blood Glucose.: 150 mg/dL (06 Mar 2025 07:44)  POCT Blood Glucose.: 150 mg/dL (06 Mar 2025 06:09)  POCT Blood Glucose.: 101 mg/dL (05 Mar 2025 21:44)  POCT Blood Glucose.: 64 mg/dL (05 Mar 2025 20:58)  POCT Blood Glucose.: 165 mg/dL (05 Mar 2025 16:39)  POCT Blood Glucose.: 186 mg/dL (05 Mar 2025 11:38)        RADIOLOGY & ADDITIONAL TESTS:  < from: CT Head No Cont (03.04.25 @ 21:13) >    ACC: [id number] EXAM:  CT BRAIN   ORDERED BY: JARRET FARMER     PROCEDURE DATE:  03/04/2025          INTERPRETATION:  .    CLINICAL INFORMATION: Syncopal episode.    TECHNIQUE: Multiple axial CT images of the head were obtained without   contrast. Sagittal and coronal reconstructed images were acquired from   the source data.    COMPARISON: No prior CT studies of the brain are available for comparison   at this institution.    FINDINGS: There is no acute intracranial hemorrhage, mass effect, shift   of the midline structures, herniation, extra-axial fluid collection, or   hydrocephalus.    There is mild diffuse cerebral volume loss with prominence of the sulci,   fissures, and cisternal spaces which is normal for the patient's age.   There is mild deep and periventricular white matter hypoattenuation   statistically compatible with microvascular changes given calcific   atherosclerotic disease of the intracranial arteries.    The paranasal sinuses and tympanomastoid cavities are clear. The   calvarium is intact. There is evidence of bilateral cataract removal.    IMPRESSION: No acute intracranial hemorrhage, mass effect, or shift of   the midline structures.    Mild chronic white matter microvascular type changes.    --- End of Report ---            TULIO HALL MD; Attending Radiologist  This document has been electronically signed. Mar  4 2025  9:19PM    < end of copied text >    Imaging Personally Reviewed:  YES/NO    Consultant(s) Notes Reviewed:   YES/ No    Care Discussed with Consultants :     Plan of care was discussed with patient and /or primary care giver; all questions and concerns were addressed and care was aligned with patient's wishes.    
NP Note discussed with  Primary Attending    Patient is a 74y old  Female who presents with a chief complaint of GI Bleed, symptomatic anemia (05 Mar 2025 09:42)      INTERVAL HPI/OVERNIGHT EVENTS:  73F AAOX3, Ambulates with a cane with history of HTN, DM, GERD and endometrial CA s/p hysterectomy completed chemo/radiation therapy in December presented to the ED after multiple bloody BM yesterday. Patient presented similarly in  October of last year. Patient had 4 episodes of bloody diarrhea with clots on 3/3. Symptoms associated with fatigue, weakness, abdominal pressure and dizziness over the last several days as well as one episode of syncope while in bed. Pt is admitted for ?GIB, symptomatic anemia and ?syncope    CTH negative for acute findings. Was not able to do CTA 2/2 BAILEY on admission. GI consulted for GIB. hgb was 5.2 on admission, s/p 2U PRBC. This AM, hgb 7.2, will transfuse another unit. Dr Wolfe consulted, pending echo  Pt is awake, alert, had BM this morning with small clot as per pt    MEDICATIONS  (STANDING):  insulin lispro (ADMELOG) corrective regimen sliding scale   SubCutaneous three times a day before meals  insulin lispro (ADMELOG) corrective regimen sliding scale   SubCutaneous at bedtime  pantoprazole  Injectable 40 milliGRAM(s) IV Push every 12 hours    MEDICATIONS  (PRN):  acetaminophen     Tablet .. 650 milliGRAM(s) Oral every 6 hours PRN Temp greater or equal to 38C (100.4F), Mild Pain (1 - 3)  aluminum hydroxide/magnesium hydroxide/simethicone Suspension 30 milliLiter(s) Oral every 4 hours PRN Dyspepsia  melatonin 3 milliGRAM(s) Oral at bedtime PRN Insomnia  ondansetron Injectable 4 milliGRAM(s) IV Push every 8 hours PRN Nausea and/or Vomiting      __________________________________________________  REVIEW OF SYSTEMS:    CONSTITUTIONAL: No fever,   EYES: no acute visual disturbances  NECK: No pain or stiffness  RESPIRATORY: No cough; No shortness of breath  CARDIOVASCULAR: No chest pain, no palpitations  GASTROINTESTINAL: No pain. No nausea or vomiting; No diarrhea   NEUROLOGICAL: No headache or numbness, no tremors  MUSCULOSKELETAL: No joint pain, no muscle pain  GENITOURINARY: no dysuria, no frequency, no hesitancy  ALL OTHER  ROS negative        Vital Signs Last 24 Hrs  T(C): 37 (05 Mar 2025 07:38), Max: 38 (04 Mar 2025 23:55)  T(F): 98.6 (05 Mar 2025 07:38), Max: 100.4 (04 Mar 2025 23:55)  HR: 86 (05 Mar 2025 07:38) (72 - 96)  BP: 122/66 (05 Mar 2025 07:38) (103/63 - 136/59)  BP(mean): --  RR: 18 (05 Mar 2025 07:38) (14 - 19)  SpO2: 95% (05 Mar 2025 07:38) (95% - 99%)    Parameters below as of 05 Mar 2025 07:38  Patient On (Oxygen Delivery Method): room air        ________________________________________________  PHYSICAL EXAM:  GENERAL: NAD  HEENT: Normocephalic; moist mucous membranes;   NECK : supple  CHEST/LUNG: Clear to auscultation bilaterally with good air entry   HEART: S1 S2  regular;  ABDOMEN: Soft, Nontender, Nondistended; Bowel sounds present  EXTREMITIES: no cyanosis; no edema; no calf tenderness  SKIN: warm and dry; no rash  NERVOUS SYSTEM:  Awake and alert; Oriented to place, person and time    _________________________________________________  LABS:                        7.2    7.56  )-----------( 149      ( 05 Mar 2025 05:25 )             22.1     03-05    146[H]  |  116[H]  |  46[H]  ----------------------------<  137[H]  4.4   |  26  |  1.16    Ca    8.8      05 Mar 2025 05:25  Phos  3.3     03-05  Mg     2.6     03-05    TPro  5.7[L]  /  Alb  2.9[L]  /  TBili  0.2  /  DBili  x   /  AST  8[L]  /  ALT  17  /  AlkPhos  59  03-05      Urinalysis Basic - ( 05 Mar 2025 05:25 )    Color: x / Appearance: x / SG: x / pH: x  Gluc: 137 mg/dL / Ketone: x  / Bili: x / Urobili: x   Blood: x / Protein: x / Nitrite: x   Leuk Esterase: x / RBC: x / WBC x   Sq Epi: x / Non Sq Epi: x / Bacteria: x      CAPILLARY BLOOD GLUCOSE      POCT Blood Glucose.: 143 mg/dL (05 Mar 2025 08:17)  POCT Blood Glucose.: 141 mg/dL (05 Mar 2025 00:06)  POCT Blood Glucose.: 145 mg/dL (04 Mar 2025 21:39)        RADIOLOGY & ADDITIONAL TESTS:  < from: CT Head No Cont (03.04.25 @ 21:13) >    ACC: [id number] EXAM:  CT BRAIN   ORDERED BY: JARRET FARMER     PROCEDURE DATE:  03/04/2025          INTERPRETATION:  .    CLINICAL INFORMATION: Syncopal episode.    TECHNIQUE: Multiple axial CT images of the head were obtained without   contrast. Sagittal and coronal reconstructed images were acquired from   the source data.    COMPARISON: No prior CT studies of the brain are available for comparison   at this institution.    FINDINGS: There is no acute intracranial hemorrhage, mass effect, shift   of the midline structures, herniation, extra-axial fluid collection, or   hydrocephalus.    There is mild diffuse cerebral volume loss with prominence of the sulci,   fissures, and cisternal spaces which is normal for the patient's age.   There is mild deep and periventricular white matter hypoattenuation   statistically compatible with microvascular changes given calcific   atherosclerotic disease of the intracranial arteries.    The paranasal sinuses and tympanomastoid cavities are clear. The   calvarium is intact. There is evidence of bilateral cataract removal.    IMPRESSION: No acute intracranial hemorrhage, mass effect, or shift of   the midline structures.    Mild chronic white matter microvascular type changes.    --- End of Report ---            TULIO HALL MD; Attending Radiologist  This document has been electronically signed. Mar  4 2025  9:19PM    < end of copied text >    Imaging Personally Reviewed:  YES/NO    Consultant(s) Notes Reviewed:   YES/ No    Care Discussed with Consultants :     Plan of care was discussed with patient and /or primary care giver; all questions and concerns were addressed and care was aligned with patient's wishes.

## 2025-03-07 NOTE — DISCHARGE NOTE NURSING/CASE MANAGEMENT/SOCIAL WORK - PATIENT PORTAL LINK FT
You can access the FollowMyHealth Patient Portal offered by [facility] by registering at the following website: [email or web address]. By joining [facility] FollowMyHealth portal, you will also be able to view your health information using other applications (apps) compatible with our system.

## 2025-03-07 NOTE — PROGRESS NOTE ADULT - ASSESSMENT
73 year old female with a history of HTN, GERD, DM and endometrial cancer here with rectal bleeding s/p EGD/COLON with diverticulosis and hemorrhoids without evidence of active or recent bleeding    Rectal Bleeding  Hemorrhoidal or diverticular in origin, now resolved  High fiber diet  Avoid nsaids  Remainder of care per primary team 
73F AAOX3, Ambulates with a cane with history significant for HTN, DM, GERD and  endometrial  CA s/p hysterectomy completed chemo/radiation therapy in december presented to the ED after multiple bloody BM yesterday. Patient presented similarly in  october of last year. Patient had 4 episodes of bloody diarrhea with clots yesterday. Nephrology consult called for BAILEY/Electrolytes disorder    Assessment:  1) Non oliguric BAILEY now resolved with stable renal function with S cr 0.9 likely pre-renal/dehydration/Gi bleeding  2) Electrolytes disorder now with improvement  3) Acute GI bleeding likely diverticular bleeding  4) Hypertension  5) DM type II  6) HLD  7) Endometrial Cancer s/p Hysterectomy s/p Chemo/radiation  8) E.Coli UTI on IV Rocephin    Recommend:  Strict I/o  Avoid nephrotoxic agents  S cr 0.9 with non oliguria and stable electrolytes  Monitor BMP and electrolytes daily  IV/po hydration  GI team for anemia work up as in or out patient  S/p 3 unit PRBC with Hgb 8.5 with no active bleeding  Transfuse PBRC with goal Hgb 7 to 8 gm/DL  Optimal BP control with po medications with holding parameters  Optimal DM control with insulin therapy  Optimal pain control  IV Abx with Rocephin for E.coli UTI for 3 to 5 days  Volume status and electrolytes noted  No urgent indication for HD  Discharge planning per primary team  Will follow
73F AAOX3, Ambulates with a cane with history significant for HTN, DM, GERD and  endometrial  CA s/p hysterectomy completed chemo/radiation therapy in December presented to the ED after multiple bloody BM.  Patient admitted for GI bleed, symptomatic anemia and syncope.
73F AAOX3, Ambulates with a cane with history significant for HTN, DM, GERD and  endometrial  CA s/p hysterectomy completed chemo/radiation therapy in December presented to the ED after multiple bloody BM.  Patient admitted for GI bleed, symptomatic anemia and syncope.

## 2025-03-07 NOTE — CHART NOTE - NSCHARTNOTEFT_GEN_A_CORE
Assessment:     Factors impacting intake: [ ] none [ ] nausea  [ ] vomiting [ ] diarrhea [ ] constipation  [ ]chewing problems [ ] swallowing issues  [ ] other:     Diet Presciption: Diet, Regular:   Consistent Carbohydrate {No Snacks}  High Fiber  DASH/TLC {Sodium & Cholesterol Restricted} (25 @ 15:42)    Intake:     Daily Weight in k.1 (07 Mar 2025 04:20)  Weight in k (06 Mar 2025 04:30)  Weight in k.4 (05 Mar 2025 05:34)  Weight in k.2 (04 Mar 2025 21:57)    % Weight Change    Pertinent Medications: MEDICATIONS  (STANDING):  cefTRIAXone   IVPB 1000 milliGRAM(s) IV Intermittent every 24 hours  insulin lispro (ADMELOG) corrective regimen sliding scale   SubCutaneous three times a day before meals  insulin lispro (ADMELOG) corrective regimen sliding scale   SubCutaneous at bedtime  pantoprazole  Injectable 40 milliGRAM(s) IV Push every 12 hours    MEDICATIONS  (PRN):  acetaminophen     Tablet .. 650 milliGRAM(s) Oral every 6 hours PRN Temp greater or equal to 38C (100.4F), Mild Pain (1 - 3)  aluminum hydroxide/magnesium hydroxide/simethicone Suspension 30 milliLiter(s) Oral every 4 hours PRN Dyspepsia  melatonin 3 milliGRAM(s) Oral at bedtime PRN Insomnia  ondansetron Injectable 4 milliGRAM(s) IV Push every 8 hours PRN Nausea and/or Vomiting    Pertinent Labs:  Na139 mmol/L Glu 175 mg/dL[H] K+ 4.1 mmol/L Cr  0.96 mg/dL BUN 15 mg/dL  Phos 3.4 mg/dL  Alb 2.8 g/dL[L]     CAPILLARY BLOOD GLUCOSE      POCT Blood Glucose.: 161 mg/dL (07 Mar 2025 08:01)  POCT Blood Glucose.: 198 mg/dL (06 Mar 2025 21:20)  POCT Blood Glucose.: 191 mg/dL (06 Mar 2025 17:00)  POCT Blood Glucose.: 169 mg/dL (06 Mar 2025 11:52)      Skin:     Estimated Needs:   [ ] no change since previous assessment  [ ] recalculated:     Previous Nutrition Diagnosis:   [ ] Inadequate Energy Intake [ ]Inadequate Oral Intake [ ] Excessive Energy Intake   [ ] Underweight [ ] Increased Nutrient Needs [ ] Overweight/Obesity  [ ] Swallowing Difficult   [ ] Altered GI Function [ ] Unintended Weight Loss [ ] Food & Nutrition Related Knowledge Deficit [ ] Malnutrition   [ ] Not Ready for Diet/Life Style Changes     Nutrition Diagnosis is [ ] ongoing  [ ] Improving   [ ] resolved [ ] not applicable     New Nutrition Diagnosis: [ ] not applicable       Interventions:   Recommend  [ ] Change Diet To:  [ ] Nutrition Supplement  [ ] Nutrition Support  [ ] Other:     Monitoring and Evaluation:   [ ] PO intake [ x ] Tolerance to diet prescription [ x ] weights [ x ] labs[ x ] follow up per protocol  [ ] other: Nutrition Focus Note:   Discharge diet education requested by RN for high fiber diet. Chart reviewed, s/p Endoscopy 3/6/25, diverticulosis, high fiber diet per MD; Pt seen, alert, oriented, well-engaged. Aware of this GI Dx with episode of exacerbation 4m ago, trying to watch high fiber food, healthy food choices, h/o DM, WkhW8I=6.6 noted; Pt very receptive to nutrition information, questions discussed, verbalized understanding, written copy given ( Diverticulosis, Diverticulosis Diet from [employer], High Fiber diet from Nutrition Care Manual), RD business card attached for contact as needed; Discussed with RN/

## 2025-03-07 NOTE — DISCHARGE NOTE PROVIDER - NSDCCPCAREPLAN_GEN_ALL_CORE_FT
PRINCIPAL DISCHARGE DIAGNOSIS  Diagnosis: GI bleed  Assessment and Plan of Treatment: You presented with blood in stool and your hemoglobin was low. You received blood transfusion and your hemoglobin is now stable  Your EGD/colonoscopy showed   You have no further bleeding since EGD/colonoscopy      SECONDARY DISCHARGE DIAGNOSES  Diagnosis: BAILEY (acute kidney injury)  Assessment and Plan of Treatment:     Diagnosis: Symptomatic anemia  Assessment and Plan of Treatment:      PRINCIPAL DISCHARGE DIAGNOSIS  Diagnosis: GI bleed  Assessment and Plan of Treatment: You presented with blood in stool and your hemoglobin was low. You received blood transfusion and your hemoglobin is now stable  Your EGD/colonoscopy showed Severe diverticulosis of the whole colon.  Grade/Stage I hemorrhoids. No active GIB, rectal bleeding hemorrhoidal vs diverticular now resolved  You have no further bleeding since EGD/colonoscopy  Follow up with gastroenterologist outpatient      SECONDARY DISCHARGE DIAGNOSES  Diagnosis: BAILEY (acute kidney injury)  Assessment and Plan of Treatment: Your creatinine was elevated than normal on admission that can indicate poor kidney perfusion. You were given blood and IV fluids and now your kidney function is normal  You were evaluated by nephrologist with no further recommendaton  Manage other health conditions such as diabetes, high blood pressure, or heart disease. These conditions increase your risk for acute kidney injury. Take your medicines for these conditions as directed. Also, monitor your blood sugar and blood pressure levels as directed.  NSAIDs, stomach medicine, or laxatives may harm your kidneys and increase your risk for acute kidney injury  Drink liquids to help your kidneys work better and decrease your risk for dehydration.    Diagnosis: Syncope  Assessment and Plan of Treatment: You reported syncope while lying down. CTH negative for acute findings  You were evaluated by cardiologist and had echocardiogram  Your echo showed EF of 72% with mild pulmonary hypertension  Follow up with cardiologist and pulmonogist routinely    Diagnosis: Acute UTI  Assessment and Plan of Treatment: Your urine culture is positive for bacteria  You are to complete oral antibiotics for total of 5 days  Cefuroxime 250mg twice a day until 3/10  Drink enough water and fluids to keep your urine clear or pale yellow.  Avoid caffeine, tea, and carbonated beverages. They tend to irritate your bladder.  Empty your bladder often. Avoid holding urine for long periods of time.  SEEK MEDICAL CARE IF:  You have back pain.  You develop a fever.  Your symptoms do not begin to resolve within 3 days.  SEEK IMMEDIATE MEDICAL CARE IF:  You have severe back pain or lower abdominal pain.  You develop chills.  You have nausea or vomiting.  You have continued burning or discomfort with urination.    Diagnosis: DM (diabetes mellitus)  Assessment and Plan of Treatment: HgA1C this admission 6.6. Your glucose was controlled  Make sure you get your HgA1c checked every three months.  If you have not seen your ophthalmologist this year call for appointment.  Check your feet daily for redness, sores, or openings. Do not self treat. If no improvement in two days call your primary care physician for an appointment.  Follow up with endocrinologist to establish long term goals for your A1c and for long term management and monitoring    Diagnosis: Leiomyosarcoma of uterus  Assessment and Plan of Treatment: Follow up with Dr Flynn    Diagnosis: HTN (hypertension)  Assessment and Plan of Treatment: You can resume your home medications  Follow up with primary care provider to establish long term goal for your blood pressure and for long term management and monitoring  Notify your doctor if you have any of the following symptoms:   Dizziness, Lightheadedness, Blurry vision, Headache, Chest pain, Shortness of breath    Diagnosis: Symptomatic anemia  Assessment and Plan of Treatment: Your hemoglobin was low on admission and you require blood transfusions  Your hemoglobin is now stable  Follow up with hematologist for further management  You might need iron supplements  Symptoms to report, bleeding, palpitations, fatigue, pale skin, cold skin, dizziness. Take medications as ordered by PCP

## 2025-03-07 NOTE — DISCHARGE NOTE PROVIDER - CARE PROVIDER_API CALL
Henrietta Ramirez  [facility]  [address] [place] [address]  Phone: [phone]  Fax: [phone]  Follow Up Time: 1 week

## 2025-03-07 NOTE — DISCHARGE NOTE PROVIDER - NSDCMRMEDTOKEN_GEN_ALL_CORE_FT
dexAMETHasone 4 mg oral tablet: 2 tab(s) orally once a day  diphenhydrAMINE 25 mg oral capsule: 1 cap(s) orally once a day  famotidine 20 mg oral tablet: 1 tab(s) orally once a day  Farxiga 10 mg oral tablet: 1 tab(s) orally once a day  losartan 100 mg oral tablet: 1 tab(s) orally once a day  losartan-hydroCHLOROthiazide 100 mg-25 mg oral tablet: 1 tab(s) orally once a day  Ozempic 2 mg/3 mL (0.25 mg or 0.5 mg dose) subcutaneous solution: 0.5 milligram(s) subcutaneously once a week   cefuroxime 250 mg oral tablet: 1 tab(s) orally 2 times a day Start on 3/8-3/10  dexAMETHasone 4 mg oral tablet: 2 tab(s) orally once a day  diphenhydrAMINE 25 mg oral capsule: 1 cap(s) orally once a day  Farxiga 10 mg oral tablet: 1 tab(s) orally once a day  losartan 100 mg oral tablet: 1 tab(s) orally once a day  Ozempic 2 mg/3 mL (0.25 mg or 0.5 mg dose) subcutaneous solution: 0.5 milligram(s) subcutaneously once a week  Protonix 40 mg oral delayed release tablet: 1 tab(s) orally once a day

## 2025-03-07 NOTE — DISCHARGE NOTE NURSING/CASE MANAGEMENT/SOCIAL WORK - FINANCIAL ASSISTANCE
[facility] provides services at a reduced cost to those who are determined to be eligible through [facility]’s financial assistance program. Information regarding [facility]’s financial assistance program can be found by going to [email or web address] or by calling [phone].

## 2025-03-20 ENCOUNTER — APPOINTMENT (OUTPATIENT)
Dept: INTERNAL MEDICINE | Facility: CLINIC | Age: 75
End: 2025-03-20
Payer: MEDICARE

## 2025-03-20 VITALS — SYSTOLIC BLOOD PRESSURE: 136 MMHG | DIASTOLIC BLOOD PRESSURE: 67 MMHG | HEART RATE: 84 BPM

## 2025-03-20 VITALS
DIASTOLIC BLOOD PRESSURE: 71 MMHG | HEIGHT: 61 IN | SYSTOLIC BLOOD PRESSURE: 154 MMHG | HEART RATE: 96 BPM | WEIGHT: 205 LBS | OXYGEN SATURATION: 97 % | TEMPERATURE: 98.6 F | BODY MASS INDEX: 38.71 KG/M2

## 2025-03-20 DIAGNOSIS — E11.9 TYPE 2 DIABETES MELLITUS W/OUT COMPLICATIONS: ICD-10-CM

## 2025-03-20 DIAGNOSIS — N39.0 URINARY TRACT INFECTION, SITE NOT SPECIFIED: ICD-10-CM

## 2025-03-20 DIAGNOSIS — N17.9 ACUTE KIDNEY FAILURE, UNSPECIFIED: ICD-10-CM

## 2025-03-20 DIAGNOSIS — D69.6 THROMBOCYTOPENIA, UNSPECIFIED: ICD-10-CM

## 2025-03-20 DIAGNOSIS — K57.30 DIVERTICULOSIS OF LARGE INTESTINE W/OUT PERFORATION OR ABSCESS W/OUT BLEEDING: ICD-10-CM

## 2025-03-20 DIAGNOSIS — I10 ESSENTIAL (PRIMARY) HYPERTENSION: ICD-10-CM

## 2025-03-20 PROCEDURE — 99495 TRANSJ CARE MGMT MOD F2F 14D: CPT

## 2025-03-24 ENCOUNTER — APPOINTMENT (OUTPATIENT)
Dept: GYNECOLOGIC ONCOLOGY | Facility: CLINIC | Age: 75
End: 2025-03-24

## 2025-03-24 PROBLEM — N39.0 UTI (URINARY TRACT INFECTION): Status: ACTIVE | Noted: 2025-03-22 | Resolved: 2025-04-21

## 2025-03-24 PROBLEM — N17.9 ACUTE KIDNEY INJURY: Status: ACTIVE | Noted: 2025-03-22

## 2025-03-24 PROBLEM — D69.6 THROMBOCYTOPENIA: Status: ACTIVE | Noted: 2025-03-24

## 2025-03-24 PROBLEM — K57.30 DIVERTICULOSIS OF COLON: Status: ACTIVE | Noted: 2025-03-22

## 2025-03-24 NOTE — DISCHARGE NOTE PROVIDER - NSDCHC_MEDRECSTATUS_GEN_ALL_CORE
3/24/2025    Chief Complaint   Patient presents with    Obesity     Wants to discuss when she can resume the phentermine        Blaire Hamm is a 47 y.o. female, presents today for:      ASSESSMENT/PLAN:    1. Morbid obesity with BMI of 45.0-49.9, adult  Discussed normal findings of weight loss including hair loss.  Discussed multivitamin for hair loss while on medication. Encouraged to monitor her hair loss for now.  Pt verbalized understanding  Continue Phentermine for M#3.  12 lbs weight loss since last OV.  Increase to 37.5 mg  5% weight loss goal 15 lb by 2/2024 or 281  WC 57 in--> 55 in--> 53.5 in  Encouraged diet/ exercise changes  Negative POCT UPT  - phentermine 37.5 MG capsule; Take 1 capsule by mouth every morning for 30 days. Max Daily Amount: 37.5 mg  Dispense: 30 capsule; Refill: 0    2. Bipolar disease, chronic (HCC)  Symptomatic but overall controlled  Patient now wishing to receive medications from this office due to interpersonal issues  Continue Buspirone 10 BID, Lamictal 200 daily, Vraylar 2.5.  Pt will need to clarify Lexapro dose with pharmacy.  Encouraged to continue to see support from family and friends.    - busPIRone (BUSPAR) 10 MG tablet; Take 2 tablets by mouth in the morning and at bedtime  Dispense: 120 tablet; Refill: 5  - lamoTRIgine (LAMICTAL) 200 MG tablet; Take 1 tablet by mouth nightly depression  Dispense: 30 tablet; Refill: 5  - VRAYLAR 4.5 MG CAPS capsule; Take 1 capsule by mouth daily depression  Dispense: 30 capsule; Refill: 5    3. MDD (major depressive disorder), recurrent episode, moderate (HCC)  See above.    - busPIRone (BUSPAR) 10 MG tablet; Take 2 tablets by mouth in the morning and at bedtime  Dispense: 120 tablet; Refill: 5  - lamoTRIgine (LAMICTAL) 200 MG tablet; Take 1 tablet by mouth nightly depression  Dispense: 30 tablet; Refill: 5  - VRAYLAR 4.5 MG CAPS capsule; Take 1 capsule by mouth daily depression  Dispense: 30 capsule; Refill: 5      Return in about  Admission Reconciliation is Completed  Discharge Reconciliation is Completed

## 2025-04-28 ENCOUNTER — APPOINTMENT (OUTPATIENT)
Dept: GYNECOLOGIC ONCOLOGY | Facility: CLINIC | Age: 75
End: 2025-04-28
Payer: MEDICARE

## 2025-04-28 ENCOUNTER — APPOINTMENT (OUTPATIENT)
Dept: GASTROENTEROLOGY | Facility: HOSPITAL | Age: 75
End: 2025-04-28

## 2025-04-28 VITALS
OXYGEN SATURATION: 98 % | BODY MASS INDEX: 37.95 KG/M2 | WEIGHT: 201 LBS | HEIGHT: 61 IN | SYSTOLIC BLOOD PRESSURE: 161 MMHG | HEART RATE: 84 BPM | DIASTOLIC BLOOD PRESSURE: 73 MMHG

## 2025-04-28 DIAGNOSIS — Z78.9 OTHER SPECIFIED HEALTH STATUS: ICD-10-CM

## 2025-04-28 DIAGNOSIS — Z92.3 PERSONAL HISTORY OF IRRADIATION: ICD-10-CM

## 2025-04-28 DIAGNOSIS — C54.9 MALIGNANT NEOPLASM OF CORPUS UTERI, UNSPECIFIED: ICD-10-CM

## 2025-04-28 DIAGNOSIS — E11.9 TYPE 2 DIABETES MELLITUS W/OUT COMPLICATIONS: ICD-10-CM

## 2025-04-28 PROCEDURE — 99459 PELVIC EXAMINATION: CPT

## 2025-04-28 PROCEDURE — 99214 OFFICE O/P EST MOD 30 MIN: CPT

## 2025-04-29 ENCOUNTER — APPOINTMENT (OUTPATIENT)
Dept: CT IMAGING | Facility: CLINIC | Age: 75
End: 2025-04-29
Payer: MEDICARE

## 2025-04-29 PROCEDURE — 71250 CT THORAX DX C-: CPT | Mod: TC

## 2025-04-29 PROCEDURE — 74176 CT ABD & PELVIS W/O CONTRAST: CPT | Mod: TC

## 2025-05-06 ENCOUNTER — RX RENEWAL (OUTPATIENT)
Age: 75
End: 2025-05-06

## 2025-05-07 ENCOUNTER — APPOINTMENT (OUTPATIENT)
Age: 75
End: 2025-05-07
Payer: MEDICARE

## 2025-05-07 DIAGNOSIS — M16.12 UNILATERAL PRIMARY OSTEOARTHRITIS, LEFT HIP: ICD-10-CM

## 2025-05-07 DIAGNOSIS — M25.311 OTHER INSTABILITY, RIGHT SHOULDER: ICD-10-CM

## 2025-05-07 DIAGNOSIS — M47.816 SPONDYLOSIS W/OUT MYELOPATHY OR RADICULOPATHY, LUMBAR REGION: ICD-10-CM

## 2025-05-07 PROCEDURE — 99214 OFFICE O/P EST MOD 30 MIN: CPT

## 2025-05-08 PROBLEM — M16.12 ARTHRITIS OF LEFT HIP: Status: ACTIVE | Noted: 2025-05-08

## 2025-05-08 PROBLEM — M47.816 SPONDYLOSIS OF LUMBAR SPINE: Status: ACTIVE | Noted: 2025-05-08

## 2025-05-20 ENCOUNTER — APPOINTMENT (OUTPATIENT)
Age: 75
End: 2025-05-20

## 2025-05-29 ENCOUNTER — APPOINTMENT (OUTPATIENT)
Dept: UROGYNECOLOGY | Facility: CLINIC | Age: 75
End: 2025-05-29

## 2025-05-29 ENCOUNTER — NON-APPOINTMENT (OUTPATIENT)
Age: 75
End: 2025-05-29

## 2025-05-29 VITALS
HEART RATE: 71 BPM | RESPIRATION RATE: 16 BRPM | HEIGHT: 61 IN | DIASTOLIC BLOOD PRESSURE: 84 MMHG | TEMPERATURE: 98.2 F | SYSTOLIC BLOOD PRESSURE: 167 MMHG | OXYGEN SATURATION: 99 % | WEIGHT: 203 LBS | BODY MASS INDEX: 38.33 KG/M2

## 2025-05-29 DIAGNOSIS — N39.41 URGE INCONTINENCE: ICD-10-CM

## 2025-05-29 DIAGNOSIS — N95.2 POSTMENOPAUSAL ATROPHIC VAGINITIS: ICD-10-CM

## 2025-05-29 DIAGNOSIS — R39.15 URGENCY OF URINATION: ICD-10-CM

## 2025-05-29 DIAGNOSIS — N39.3 STRESS INCONTINENCE (FEMALE) (MALE): ICD-10-CM

## 2025-05-29 PROCEDURE — 51701 INSERT BLADDER CATHETER: CPT

## 2025-05-29 PROCEDURE — 99459 PELVIC EXAMINATION: CPT

## 2025-05-29 PROCEDURE — 99214 OFFICE O/P EST MOD 30 MIN: CPT | Mod: 25

## 2025-06-03 DIAGNOSIS — N39.0 URINARY TRACT INFECTION, SITE NOT SPECIFIED: ICD-10-CM

## 2025-06-03 LAB — BACTERIA UR CULT: ABNORMAL

## 2025-06-03 RX ORDER — SULFAMETHOXAZOLE AND TRIMETHOPRIM 800; 160 MG/1; MG/1
800-160 TABLET ORAL TWICE DAILY
Qty: 6 | Refills: 0 | Status: ACTIVE | COMMUNITY
Start: 2025-06-03 | End: 1900-01-01

## 2025-06-16 ENCOUNTER — RX RENEWAL (OUTPATIENT)
Age: 75
End: 2025-06-16

## 2025-06-18 ENCOUNTER — APPOINTMENT (OUTPATIENT)
Dept: UROGYNECOLOGY | Facility: CLINIC | Age: 75
End: 2025-06-18

## 2025-06-18 VITALS
BODY MASS INDEX: 37.95 KG/M2 | OXYGEN SATURATION: 98 % | WEIGHT: 201 LBS | RESPIRATION RATE: 16 BRPM | SYSTOLIC BLOOD PRESSURE: 135 MMHG | DIASTOLIC BLOOD PRESSURE: 74 MMHG | HEIGHT: 61 IN | TEMPERATURE: 98.2 F | HEART RATE: 84 BPM

## 2025-06-18 LAB
BILIRUB UR QL STRIP: NEGATIVE
CLARITY UR: CLEAR
COLLECTION METHOD: NORMAL
GLUCOSE UR-MCNC: NEGATIVE
HCG UR QL: 0.2 EU/DL
HGB UR QL STRIP.AUTO: NEGATIVE
KETONES UR-MCNC: NEGATIVE
LEUKOCYTE ESTERASE UR QL STRIP: NEGATIVE
NITRITE UR QL STRIP: NEGATIVE
PH UR STRIP: 5.5
PROT UR STRIP-MCNC: 100
SP GR UR STRIP: 1.01

## 2025-06-18 PROCEDURE — 51784 ANAL/URINARY MUSCLE STUDY: CPT

## 2025-06-18 PROCEDURE — 81003 URINALYSIS AUTO W/O SCOPE: CPT | Mod: QW

## 2025-06-18 PROCEDURE — 51729 CYSTOMETROGRAM W/VP&UP: CPT

## 2025-06-18 PROCEDURE — 51797 INTRAABDOMINAL PRESSURE TEST: CPT

## 2025-06-19 ENCOUNTER — APPOINTMENT (OUTPATIENT)
Dept: INTERNAL MEDICINE | Facility: CLINIC | Age: 75
End: 2025-06-19
Payer: MEDICARE

## 2025-06-19 VITALS
HEIGHT: 61 IN | SYSTOLIC BLOOD PRESSURE: 161 MMHG | BODY MASS INDEX: 37.95 KG/M2 | TEMPERATURE: 97.5 F | WEIGHT: 201 LBS | DIASTOLIC BLOOD PRESSURE: 75 MMHG | HEART RATE: 77 BPM

## 2025-06-19 VITALS — DIASTOLIC BLOOD PRESSURE: 76 MMHG | SYSTOLIC BLOOD PRESSURE: 141 MMHG | HEART RATE: 74 BPM

## 2025-06-19 PROBLEM — E11.65 UNCONTROLLED TYPE 2 DIABETES MELLITUS WITH HYPERGLYCEMIA: Status: ACTIVE | Noted: 2025-06-19

## 2025-06-19 LAB — HBA1C MFR BLD HPLC: 8

## 2025-06-19 PROCEDURE — G2211 COMPLEX E/M VISIT ADD ON: CPT

## 2025-06-19 PROCEDURE — 99215 OFFICE O/P EST HI 40 MIN: CPT

## 2025-06-19 PROCEDURE — 83036 HEMOGLOBIN GLYCOSYLATED A1C: CPT | Mod: QW

## 2025-06-19 RX ORDER — DULAGLUTIDE 0.75 MG/.5ML
0.75 INJECTION, SOLUTION SUBCUTANEOUS
Qty: 3 | Refills: 1 | Status: ACTIVE | COMMUNITY
Start: 2025-06-19 | End: 1900-01-01

## 2025-06-19 RX ORDER — HYDROCHLOROTHIAZIDE 25 MG/1
25 TABLET ORAL
Qty: 90 | Refills: 1 | Status: ACTIVE | COMMUNITY
Start: 2025-06-19 | End: 1900-01-01

## 2025-06-25 ENCOUNTER — APPOINTMENT (OUTPATIENT)
Dept: UROGYNECOLOGY | Facility: CLINIC | Age: 75
End: 2025-06-25

## 2025-06-25 VITALS
SYSTOLIC BLOOD PRESSURE: 147 MMHG | HEART RATE: 80 BPM | WEIGHT: 201 LBS | DIASTOLIC BLOOD PRESSURE: 56 MMHG | RESPIRATION RATE: 16 BRPM | OXYGEN SATURATION: 98 % | TEMPERATURE: 97.6 F | HEIGHT: 61 IN | BODY MASS INDEX: 37.95 KG/M2

## 2025-06-25 PROCEDURE — 99459 PELVIC EXAMINATION: CPT

## 2025-06-25 PROCEDURE — 99214 OFFICE O/P EST MOD 30 MIN: CPT

## 2025-06-25 RX ORDER — CRANBERRY FRUIT EXTRACT 200 MG
CAPSULE ORAL
Qty: 1 | Refills: 2 | Status: ACTIVE | COMMUNITY
Start: 2025-06-25 | End: 1900-01-01

## 2025-07-03 ENCOUNTER — APPOINTMENT (OUTPATIENT)
Dept: ULTRASOUND IMAGING | Facility: CLINIC | Age: 75
End: 2025-07-03

## 2025-07-11 ENCOUNTER — APPOINTMENT (OUTPATIENT)
Dept: UROGYNECOLOGY | Facility: CLINIC | Age: 75
End: 2025-07-11
Payer: MEDICARE

## 2025-07-11 VITALS
WEIGHT: 195 LBS | TEMPERATURE: 98 F | OXYGEN SATURATION: 96 % | DIASTOLIC BLOOD PRESSURE: 68 MMHG | SYSTOLIC BLOOD PRESSURE: 114 MMHG | BODY MASS INDEX: 36.82 KG/M2 | RESPIRATION RATE: 16 BRPM | HEIGHT: 61 IN

## 2025-07-11 PROBLEM — Z87.898 HISTORY OF URINARY URGENCY: Status: RESOLVED | Noted: 2025-05-29 | Resolved: 2025-07-11

## 2025-07-11 PROBLEM — N32.81 OVERACTIVE BLADDER: Status: ACTIVE | Noted: 2025-07-11

## 2025-07-11 PROBLEM — Z90.710 HISTORY OF HYSTERECTOMY: Status: ACTIVE | Noted: 2025-07-11

## 2025-07-11 PROBLEM — N39.41 URGE INCONTINENCE: Status: RESOLVED | Noted: 2024-01-05 | Resolved: 2025-07-11

## 2025-07-11 PROCEDURE — 99214 OFFICE O/P EST MOD 30 MIN: CPT

## 2025-07-11 PROCEDURE — 99459 PELVIC EXAMINATION: CPT

## 2025-07-11 RX ORDER — VIBEGRON 75 MG/1
75 TABLET, FILM COATED ORAL
Qty: 90 | Refills: 3 | Status: ACTIVE | COMMUNITY
Start: 2025-07-11 | End: 1900-01-01

## 2025-07-17 ENCOUNTER — APPOINTMENT (OUTPATIENT)
Dept: INTERNAL MEDICINE | Facility: CLINIC | Age: 75
End: 2025-07-17

## 2025-07-25 ENCOUNTER — APPOINTMENT (OUTPATIENT)
Age: 75
End: 2025-07-25
Payer: MEDICARE

## 2025-07-25 VITALS
WEIGHT: 195 LBS | HEART RATE: 84 BPM | DIASTOLIC BLOOD PRESSURE: 71 MMHG | OXYGEN SATURATION: 96 % | HEIGHT: 61 IN | SYSTOLIC BLOOD PRESSURE: 128 MMHG | BODY MASS INDEX: 36.82 KG/M2

## 2025-07-25 DIAGNOSIS — M54.50 LOW BACK PAIN, UNSPECIFIED: ICD-10-CM

## 2025-07-25 DIAGNOSIS — M25.559 PAIN IN UNSPECIFIED HIP: ICD-10-CM

## 2025-07-25 PROCEDURE — 72110 X-RAY EXAM L-2 SPINE 4/>VWS: CPT

## 2025-07-25 PROCEDURE — 99214 OFFICE O/P EST MOD 30 MIN: CPT

## 2025-07-25 RX ORDER — TRAMADOL HYDROCHLORIDE 50 MG/1
50 TABLET, COATED ORAL
Qty: 21 | Refills: 1 | Status: ACTIVE | COMMUNITY
Start: 2025-07-25 | End: 1900-01-01

## 2025-07-28 ENCOUNTER — APPOINTMENT (OUTPATIENT)
Dept: GYNECOLOGIC ONCOLOGY | Facility: CLINIC | Age: 75
End: 2025-07-28

## 2025-08-06 ENCOUNTER — APPOINTMENT (OUTPATIENT)
Age: 75
End: 2025-08-06

## 2025-08-06 VITALS
DIASTOLIC BLOOD PRESSURE: 60 MMHG | HEIGHT: 61 IN | WEIGHT: 193 LBS | RESPIRATION RATE: 16 BRPM | HEART RATE: 73 BPM | OXYGEN SATURATION: 96 % | SYSTOLIC BLOOD PRESSURE: 122 MMHG | BODY MASS INDEX: 36.44 KG/M2

## 2025-08-06 DIAGNOSIS — M16.12 UNILATERAL PRIMARY OSTEOARTHRITIS, LEFT HIP: ICD-10-CM

## 2025-08-06 DIAGNOSIS — M70.62 TROCHANTERIC BURSITIS, LEFT HIP: ICD-10-CM

## 2025-08-06 PROCEDURE — 99214 OFFICE O/P EST MOD 30 MIN: CPT

## 2025-08-06 PROCEDURE — 99204 OFFICE O/P NEW MOD 45 MIN: CPT

## 2025-08-07 ENCOUNTER — APPOINTMENT (OUTPATIENT)
Age: 75
End: 2025-08-07
Payer: MEDICARE

## 2025-08-07 VITALS
HEIGHT: 61 IN | DIASTOLIC BLOOD PRESSURE: 60 MMHG | SYSTOLIC BLOOD PRESSURE: 120 MMHG | HEART RATE: 68 BPM | OXYGEN SATURATION: 97 % | WEIGHT: 193 LBS | BODY MASS INDEX: 36.44 KG/M2

## 2025-08-07 DIAGNOSIS — M25.559 PAIN IN UNSPECIFIED HIP: ICD-10-CM

## 2025-08-07 PROCEDURE — 20611 DRAIN/INJ JOINT/BURSA W/US: CPT | Mod: LT

## 2025-08-07 PROCEDURE — 99214 OFFICE O/P EST MOD 30 MIN: CPT | Mod: 25

## 2025-08-14 PROBLEM — M70.62 GREATER TROCHANTERIC BURSITIS OF LEFT HIP: Status: ACTIVE | Noted: 2025-08-14

## 2025-08-18 ENCOUNTER — RX RENEWAL (OUTPATIENT)
Age: 75
End: 2025-08-18

## 2025-08-18 RX ORDER — TIZANIDINE 2 MG/1
2 TABLET ORAL EVERY 6 HOURS
Qty: 56 | Refills: 0 | Status: ACTIVE | COMMUNITY
Start: 2025-07-25 | End: 1900-01-01

## 2025-09-02 ENCOUNTER — APPOINTMENT (OUTPATIENT)
Dept: ENDOCRINOLOGY | Facility: CLINIC | Age: 75
End: 2025-09-02
Payer: MEDICARE

## 2025-09-02 VITALS
HEIGHT: 61 IN | WEIGHT: 194 LBS | SYSTOLIC BLOOD PRESSURE: 162 MMHG | OXYGEN SATURATION: 94 % | DIASTOLIC BLOOD PRESSURE: 76 MMHG | BODY MASS INDEX: 36.63 KG/M2 | HEART RATE: 87 BPM

## 2025-09-02 DIAGNOSIS — E11.9 TYPE 2 DIABETES MELLITUS W/OUT COMPLICATIONS: ICD-10-CM

## 2025-09-02 PROCEDURE — 99204 OFFICE O/P NEW MOD 45 MIN: CPT

## 2025-09-02 PROCEDURE — 83036 HEMOGLOBIN GLYCOSYLATED A1C: CPT | Mod: QW

## 2025-09-02 PROCEDURE — 82962 GLUCOSE BLOOD TEST: CPT

## 2025-09-02 RX ORDER — BLOOD-GLUCOSE METER
70 EACH MISCELLANEOUS
Qty: 3 | Refills: 3 | Status: ACTIVE | COMMUNITY
Start: 2025-09-02 | End: 1900-01-01

## 2025-09-02 RX ORDER — BLOOD-GLUCOSE METER
W/DEVICE KIT MISCELLANEOUS
Qty: 1 | Refills: 0 | Status: ACTIVE | COMMUNITY
Start: 2025-09-02 | End: 1900-01-01

## 2025-09-02 RX ORDER — LANCETS 33 GAUGE
EACH MISCELLANEOUS
Qty: 1 | Refills: 3 | Status: ACTIVE | COMMUNITY
Start: 2025-09-02 | End: 1900-01-01

## 2025-09-02 RX ORDER — BLOOD-GLUCOSE METER
KIT MISCELLANEOUS
Qty: 2 | Refills: 5 | Status: ACTIVE | COMMUNITY
Start: 2025-09-02 | End: 1900-01-01

## 2025-09-08 ENCOUNTER — RX RENEWAL (OUTPATIENT)
Age: 75
End: 2025-09-08

## 2025-09-11 ENCOUNTER — APPOINTMENT (OUTPATIENT)
Facility: CLINIC | Age: 75
End: 2025-09-11
Payer: MEDICARE

## 2025-09-11 ENCOUNTER — APPOINTMENT (OUTPATIENT)
Dept: UROGYNECOLOGY | Facility: CLINIC | Age: 75
End: 2025-09-11

## 2025-09-11 DIAGNOSIS — I89.0 LYMPHEDEMA, NOT ELSEWHERE CLASSIFIED: ICD-10-CM

## 2025-09-11 DIAGNOSIS — R60.9 EDEMA, UNSPECIFIED: ICD-10-CM

## 2025-09-11 DIAGNOSIS — M16.12 UNILATERAL PRIMARY OSTEOARTHRITIS, LEFT HIP: ICD-10-CM

## 2025-09-11 DIAGNOSIS — C54.1 MALIGNANT NEOPLASM OF ENDOMETRIUM: ICD-10-CM

## 2025-09-11 PROCEDURE — 99203 OFFICE O/P NEW LOW 30 MIN: CPT

## 2025-09-11 RX ORDER — LIDOCAINE 4% 4 G/100G
4 CREAM TOPICAL
Qty: 1 | Refills: 3 | Status: ACTIVE | COMMUNITY
Start: 2025-09-11 | End: 1900-01-01

## 2025-09-15 ENCOUNTER — RX RENEWAL (OUTPATIENT)
Age: 75
End: 2025-09-15

## 2025-09-16 ENCOUNTER — APPOINTMENT (OUTPATIENT)
Dept: ULTRASOUND IMAGING | Facility: CLINIC | Age: 75
End: 2025-09-16
Payer: MEDICARE

## 2025-09-16 ENCOUNTER — APPOINTMENT (OUTPATIENT)
Age: 75
End: 2025-09-16
Payer: MEDICARE

## 2025-09-16 DIAGNOSIS — M25.559 PAIN IN UNSPECIFIED HIP: ICD-10-CM

## 2025-09-16 DIAGNOSIS — M54.50 LOW BACK PAIN, UNSPECIFIED: ICD-10-CM

## 2025-09-16 PROCEDURE — 93970 EXTREMITY STUDY: CPT

## 2025-09-16 PROCEDURE — 99214 OFFICE O/P EST MOD 30 MIN: CPT

## 2025-09-17 ENCOUNTER — NON-APPOINTMENT (OUTPATIENT)
Age: 75
End: 2025-09-17

## 2025-09-19 ENCOUNTER — RX RENEWAL (OUTPATIENT)
Age: 75
End: 2025-09-19

## (undated) DEVICE — PACK IV START WITH CHG

## (undated) DEVICE — TUBING AIRSEAL TRI-LUMEN FILTERED

## (undated) DEVICE — XI ARM DISSECTOR CURVED BIPOLAR 8MM

## (undated) DEVICE — SOL IRR POUR NS 0.9% 500ML

## (undated) DEVICE — Device

## (undated) DEVICE — TUBING MEDI-VAC W MAXIGRIP CONNECTORS 1/4"X6'

## (undated) DEVICE — CONTAINER FORMALIN 10% 20ML

## (undated) DEVICE — XI DRAPE ARM

## (undated) DEVICE — MASK O2 ADLT POM ELITE W/ MED AND HI CONCEN M TO M 10FT

## (undated) DEVICE — DRSG CURITY GAUZE SPONGE 4 X 4" 12-PLY NON-STERILE

## (undated) DEVICE — BASIN EMESIS 10IN GRADUATED MAUVE

## (undated) DEVICE — DRAPE UNDER BUTTOCKS W SCREEN

## (undated) DEVICE — DRSG BANDAID 0.75X3"

## (undated) DEVICE — ELCTR ECG CONDUCTIVE ADHESIVE

## (undated) DEVICE — XI ARM NEEDLE DRIVER MEGA

## (undated) DEVICE — GOWN LG

## (undated) DEVICE — SOLIDIFIER ISOLYZER 2000CC

## (undated) DEVICE — XI ARM PERMANENT CAUTERY HOOK

## (undated) DEVICE — LUBRICATING JELLY HR ONE SHOT 3G

## (undated) DEVICE — TUBING SUCTION NONCONDUCTIVE 6MM X 12FT

## (undated) DEVICE — XI ARM PERMANENT CAUTERY SPATULA

## (undated) DEVICE — SYR LUER LOK 3CC

## (undated) DEVICE — POSITIONER STRAP ARMBOARD VELCRO TS-30

## (undated) DEVICE — UTERINE MANIPULATOR DELINEATOR SC 3MM SM

## (undated) DEVICE — XI SEAL UNIVERSIAL 5-12MM

## (undated) DEVICE — SUT VLOC 180 2-0 12" V-20 GREEN

## (undated) DEVICE — D HELP - CLEARVIEW CLEARIFY SYSTEM

## (undated) DEVICE — NDL HYPO SAFE 22G X 1" (BLACK)

## (undated) DEVICE — KIT ENDO PROCEDURE CUST W/VLV

## (undated) DEVICE — XI ARM SCISSOR MONO CURVED

## (undated) DEVICE — SALIVA EJECTOR (BLUE)

## (undated) DEVICE — XI ARM FORCEP FENESTRATED BIPOLAR 8MM

## (undated) DEVICE — UNDERPAD LINEN SAVER 17 X 24"

## (undated) DEVICE — PACK ROBOTIC LIJ

## (undated) DEVICE — XI ARM FORCEP TENACULUM

## (undated) DEVICE — LABELS BLANK W PEN

## (undated) DEVICE — FOLEY TRAY 16FR 5CC LF UMETER CLOSED

## (undated) DEVICE — VENODYNE/SCD SLEEVE CALF MEDIUM

## (undated) DEVICE — CATH IV SAFE BC 22G X 1" (BLUE)

## (undated) DEVICE — SUT MONOCRYL 4-0 27" PS-2 UNDYED

## (undated) DEVICE — RUMI COLPO-PNEUMO OCCLUDER

## (undated) DEVICE — SYR LUER LOK 50CC

## (undated) DEVICE — BIOPSY FORCEP COLD DISP

## (undated) DEVICE — SOL IRR BAG NS 0.9% 1000ML

## (undated) DEVICE — PACK PERI GYN

## (undated) DEVICE — PREP BETADINE KIT

## (undated) DEVICE — SOLIDIFIER 1200CC

## (undated) DEVICE — ELCTR BOVIE PENCIL SMOKE EVACUATION

## (undated) DEVICE — DURABLE MEDICAL EQUIPMENT: Type: DURABLE MEDICAL EQUIPMENT

## (undated) DEVICE — ENDOCATCH 10MM SPECIMEN POUCH

## (undated) DEVICE — XI OBTURATOR OPTICAL BLADELESS 8MM

## (undated) DEVICE — POSITIONER PURPLE ARM ONE STEP (LARGE)

## (undated) DEVICE — XI TIP COVER

## (undated) DEVICE — POSITIONER FOAM HEAD CRADLE (PINK)

## (undated) DEVICE — SUT VICRYL 0 27" CT-2 UNDYED

## (undated) DEVICE — ELCTR BOVIE TIP BLADE INSULATED 2.75" EDGE

## (undated) DEVICE — DRSG 2X2

## (undated) DEVICE — XI DRAPE COLUMN

## (undated) DEVICE — XI ARM FORCEP MARYLAND BIPOLAR

## (undated) DEVICE — BIOPSY FORCEP RADIAL JAW 4 STANDARD WITH NEEDLE

## (undated) DEVICE — BITE BLOCK ADULT 20 X 27MM (GREEN)

## (undated) DEVICE — GOWN XL

## (undated) DEVICE — DRSG OPSITE 13.75 X 4"

## (undated) DEVICE — SUT VICRYL 0 27" UR-6

## (undated) DEVICE — ELCTR GROUNDING PAD ADULT COVIDIEN

## (undated) DEVICE — PACK D&C

## (undated) DEVICE — DENTURE CUP PINK

## (undated) DEVICE — TROCAR SURGIQUEST AIRSEAL 5MM X 100MM

## (undated) DEVICE — TUBING IV SET GRAVITY 1Y 78" MACRO

## (undated) DEVICE — TRAP SPECIMEN SPUTUM 40CC

## (undated) DEVICE — SUT VLOC 180 0 12" GS-21 GREEN

## (undated) DEVICE — ANESTHESIA CIRCUIT ADULT HMEF

## (undated) DEVICE — SOL INJ NS 0.9% 500ML 1-PORT

## (undated) DEVICE — TUBING ENDO EXT OLYMPUS 160 24HR USE GI

## (undated) DEVICE — SI OBTURATOR BLADELESS 8MM

## (undated) DEVICE — SYR LUER LOK 10CC

## (undated) DEVICE — TUBING IV SET GRAVITY 3Y 100" MACRO

## (undated) DEVICE — POSITIONER PINK PAD PIGAZZI SYSTEM

## (undated) DEVICE — WARMING BLANKET FULL ADULT

## (undated) DEVICE — TUBING STRYKEFLOW II SUCTION / IRRIGATOR